# Patient Record
Sex: MALE | Race: WHITE | NOT HISPANIC OR LATINO | Employment: PART TIME | URBAN - METROPOLITAN AREA
[De-identification: names, ages, dates, MRNs, and addresses within clinical notes are randomized per-mention and may not be internally consistent; named-entity substitution may affect disease eponyms.]

---

## 2021-02-08 ENCOUNTER — OFFICE VISIT (OUTPATIENT)
Dept: INTERNAL MEDICINE | Facility: CLINIC | Age: 51
End: 2021-02-08
Payer: COMMERCIAL

## 2021-02-08 VITALS
TEMPERATURE: 98 F | BODY MASS INDEX: 31.89 KG/M2 | SYSTOLIC BLOOD PRESSURE: 138 MMHG | HEIGHT: 75 IN | DIASTOLIC BLOOD PRESSURE: 80 MMHG | WEIGHT: 256.44 LBS | HEART RATE: 88 BPM | OXYGEN SATURATION: 97 %

## 2021-02-08 DIAGNOSIS — Z00.00 ENCOUNTER FOR PREVENTATIVE ADULT HEALTH CARE EXAMINATION: Primary | ICD-10-CM

## 2021-02-08 DIAGNOSIS — E11.9 TYPE 2 DIABETES MELLITUS WITHOUT COMPLICATION, WITHOUT LONG-TERM CURRENT USE OF INSULIN (H): ICD-10-CM

## 2021-02-08 DIAGNOSIS — Z12.5 SCREENING FOR PROSTATE CANCER: ICD-10-CM

## 2021-02-08 DIAGNOSIS — F33.41 RECURRENT MAJOR DEPRESSIVE DISORDER, IN PARTIAL REMISSION (H): ICD-10-CM

## 2021-02-08 DIAGNOSIS — I10 ESSENTIAL HYPERTENSION: ICD-10-CM

## 2021-02-08 LAB
ALBUMIN UR-MCNC: NEGATIVE MG/DL
APPEARANCE UR: CLEAR
BILIRUB UR QL STRIP: NEGATIVE
COLOR UR AUTO: YELLOW
ERYTHROCYTE [DISTWIDTH] IN BLOOD BY AUTOMATED COUNT: 12.6 % (ref 10–15)
GLUCOSE UR STRIP-MCNC: 500 MG/DL
HBA1C MFR BLD: 10.6 % (ref 0–5.6)
HCT VFR BLD AUTO: 44.7 % (ref 40–53)
HGB BLD-MCNC: 16.3 G/DL (ref 13.3–17.7)
HGB UR QL STRIP: NEGATIVE
KETONES UR STRIP-MCNC: NEGATIVE MG/DL
LEUKOCYTE ESTERASE UR QL STRIP: NEGATIVE
MCH RBC QN AUTO: 29.5 PG (ref 26.5–33)
MCHC RBC AUTO-ENTMCNC: 36.5 G/DL (ref 31.5–36.5)
MCV RBC AUTO: 81 FL (ref 78–100)
NITRATE UR QL: NEGATIVE
PH UR STRIP: 5 PH (ref 5–7)
PLATELET # BLD AUTO: 218 10E9/L (ref 150–450)
RBC # BLD AUTO: 5.52 10E12/L (ref 4.4–5.9)
SOURCE: ABNORMAL
SP GR UR STRIP: 1.01 (ref 1–1.03)
UROBILINOGEN UR STRIP-ACNC: 0.2 EU/DL (ref 0.2–1)
WBC # BLD AUTO: 7.5 10E9/L (ref 4–11)

## 2021-02-08 PROCEDURE — 80053 COMPREHEN METABOLIC PANEL: CPT | Performed by: INTERNAL MEDICINE

## 2021-02-08 PROCEDURE — 85027 COMPLETE CBC AUTOMATED: CPT | Performed by: INTERNAL MEDICINE

## 2021-02-08 PROCEDURE — 83036 HEMOGLOBIN GLYCOSYLATED A1C: CPT | Performed by: INTERNAL MEDICINE

## 2021-02-08 PROCEDURE — 83721 ASSAY OF BLOOD LIPOPROTEIN: CPT | Mod: 59 | Performed by: INTERNAL MEDICINE

## 2021-02-08 PROCEDURE — G0103 PSA SCREENING: HCPCS | Performed by: INTERNAL MEDICINE

## 2021-02-08 PROCEDURE — 36415 COLL VENOUS BLD VENIPUNCTURE: CPT | Performed by: INTERNAL MEDICINE

## 2021-02-08 PROCEDURE — 80061 LIPID PANEL: CPT | Performed by: INTERNAL MEDICINE

## 2021-02-08 PROCEDURE — 82043 UR ALBUMIN QUANTITATIVE: CPT | Performed by: INTERNAL MEDICINE

## 2021-02-08 PROCEDURE — 99386 PREV VISIT NEW AGE 40-64: CPT | Performed by: INTERNAL MEDICINE

## 2021-02-08 PROCEDURE — 84443 ASSAY THYROID STIM HORMONE: CPT | Performed by: INTERNAL MEDICINE

## 2021-02-08 PROCEDURE — 81003 URINALYSIS AUTO W/O SCOPE: CPT | Performed by: INTERNAL MEDICINE

## 2021-02-08 ASSESSMENT — MIFFLIN-ST. JEOR: SCORE: 2105.88

## 2021-02-08 NOTE — LETTER
February 10, 2021      Scout Perez  [address] [facility] [address]              Dear Scout,            High triglycerides and glucose. Not controlled diabetes.   Rest of the results are normal.   Try to improve diet and exercise.   Recommend to start on treatment for Diabetes with Metformin. Will call in. Recheck in 6 months.          Sincerely,      Jg Howell MD

## 2021-02-08 NOTE — LETTER
February 10, 2021      Scout Perez  [address] [facility] [address]              Dear Scout,            High triglycerides and glucose. Not controlled diabetes.   Rest of the results are normal.   Try to improve diet and exercise.   Recommend to start on treatment for Diabetes with Metformin. Will call in. Recheck in 6 months.          Sincerely,      Jg Howell MD

## 2021-02-08 NOTE — PROGRESS NOTES
SUBJECTIVE:   CC: Scout Perez is an 50 year old male who presents for preventative health visit.       Patient has been advised of split billing requirements and indicates understanding: Yes  Healthy Habits:     Getting at least 3 servings of Calcium per day:  NO (Few times a year)    Bi-annual eye exam:  NO    Dental care twice a year:  NO    Sleep apnea or symptoms of sleep apnea:  None    Frequency of exercise:  None    Duration of exercise:  N/A    Taking medications regularly:  No    Barriers to taking medications:  None    Medication side effects:  Not applicable    PHQ-2 Total Score: 4    Additional concerns today:  Yes          PROBLEMS TO ADD ON...  Has H/O DM. On diet , exercise. Blood sugars are controlled. No parestesias. No hypoglycemias.  Has h/o HTN. on medical treatment. BP has been controlled. No side effects from medications. No CP, HA, dizziness. good compliance with medications and low salt diet.  Has h/o depression. On medical treatment, controlled, no side effects. No depressive symptoms or suicidal ideation.    Has stopped all his medications, for 4 years. Needs reassessment.       Today's PHQ-2 Score:   PHQ-2 ( 1999 [employer]) 2/8/2021   Q1: Little interest or pleasure in doing things 1   Q2: Feeling down, depressed or hopeless 3   PHQ-2 Score 4       Abuse: Current or Past(Physical, Sexual or Emotional)- No  Do you feel safe in your environment? Yes        Social History     Tobacco Use     Smoking status: Never Smoker     Smokeless tobacco: Never Used   Substance Use Topics     Alcohol use: Not Currently     If you drink alcohol do you typically have >3 drinks per day or >7 drinks per week? No    No flowsheet data found.    Last PSA: No results found for: PSA    Reviewed orders with patient. Reviewed health maintenance and updated orders accordingly - Yes  Lab work is in process  Labs reviewed in EPIC    Reviewed and updated as needed this visit by clinical staff  Tobacco  Allergies   "Meds   Med Hx  Surg Hx  Fam Hx  Soc Hx        Reviewed and updated as needed this visit by Provider                    Review of Systems  CONSTITUTIONAL: NEGATIVE for fever, chills, change in weight  INTEGUMENTARY/SKIN: NEGATIVE for worrisome rashes, moles or lesions  EYES: NEGATIVE for vision changes or irritation  ENT: NEGATIVE for ear, mouth and throat problems  RESP: NEGATIVE for significant cough or SOB  CV: NEGATIVE for chest pain, palpitations or peripheral edema  GI: NEGATIVE for nausea, abdominal pain, heartburn, or change in bowel habits   male: negative for dysuria, hematuria, decreased urinary stream, erectile dysfunction, urethral discharge  MUSCULOSKELETAL: NEGATIVE for significant arthralgias or myalgia  NEURO: NEGATIVE for weakness, dizziness or paresthesias  PSYCHIATRIC: NEGATIVE for changes in mood or affect    OBJECTIVE:   BP (!) 160/86 (BP Location: Left arm, Patient Position: Sitting, Cuff Size: Adult Large)   Pulse 88   Temp 98  F (36.7  C) (Oral)   Ht 1.892 m (6' 2.5\")   Wt 116.3 kg (256 lb 7 oz)   SpO2 97%   BMI 32.48 kg/m      Physical Exam  GENERAL: healthy, alert and no distress  EYES: Eyes grossly normal to inspection, PERRL and conjunctivae and sclerae normal  HENT: ear canals and TM's normal, nose and mouth without ulcers or lesions  NECK: no adenopathy, no asymmetry, masses, or scars and thyroid normal to palpation  RESP: lungs clear to auscultation - no rales, rhonchi or wheezes  CV: regular rate and rhythm, normal S1 S2, no S3 or S4, no murmur, click or rub, no peripheral edema and peripheral pulses strong  ABDOMEN: soft, nontender, no hepatosplenomegaly, no masses and bowel sounds normal  MS: no gross musculoskeletal defects noted, no edema  SKIN: no suspicious lesions or rashes  NEURO: Normal strength and tone, mentation intact and speech normal  PSYCH: mentation appears normal, affect normal/bright    Diagnostic Test Results:  Labs reviewed in Epic    ASSESSMENT/PLAN: " "      ICD-10-CM    1. Encounter for preventative adult health care examination  Z00.00 CBC with platelets     Comprehensive metabolic panel     Lipid panel reflex to direct LDL Non-fasting     TSH with free T4 reflex     Prostate spec antigen screen     Hemoglobin A1c     *UA reflex to Microscopic     Albumin Random Urine Quantitative with Creat Ratio   2. Type 2 diabetes mellitus without complication, without long-term current use of insulin (H)  E11.9 Hemoglobin A1c   3. Recurrent major depressive disorder, in partial remission (H)  F33.41    4. Essential hypertension  I10 CBC with platelets     Comprehensive metabolic panel     Lipid panel reflex to direct LDL Non-fasting     TSH with free T4 reflex     *UA reflex to Microscopic     Albumin Random Urine Quantitative with Creat Ratio   5. Screening for prostate cancer  Z12.5 Prostate spec antigen screen       Patient has been advised of split billing requirements and indicates understanding: Yes  COUNSELING:   Reviewed preventive health counseling, as reflected in patient instructions       Regular exercise       Healthy diet/nutrition       Vision screening       Hearing screening       Colon cancer screening       Prostate cancer screening    Estimated body mass index is 32.48 kg/m  as calculated from the following:    Height as of this encounter: 1.892 m (6' 2.5\").    Weight as of this encounter: 116.3 kg (256 lb 7 oz).     Weight management plan: Discussed healthy diet and exercise guidelines    He reports that he has never smoked. He has never used smokeless tobacco.      Counseling Resources:  ATP IV Guidelines  Pooled Cohorts Equation Calculator  FRAX Risk Assessment  ICSI Preventive Guidelines  Dietary Guidelines for Americans, 2010  USDA's MyPlate  ASA Prophylaxis  Lung CA Screening    Jg Howell MD  [facility]  "

## 2021-02-09 LAB
ALBUMIN SERPL-MCNC: 3.9 G/DL (ref 3.4–5)
ALP SERPL-CCNC: 78 U/L (ref 40–150)
ALT SERPL W P-5'-P-CCNC: 67 U/L (ref 0–70)
ANION GAP SERPL CALCULATED.3IONS-SCNC: 10 MMOL/L (ref 3–14)
AST SERPL W P-5'-P-CCNC: 38 U/L (ref 0–45)
BILIRUB SERPL-MCNC: 0.6 MG/DL (ref 0.2–1.3)
BUN SERPL-MCNC: 12 MG/DL (ref 7–30)
CALCIUM SERPL-MCNC: 9.1 MG/DL (ref 8.5–10.1)
CHLORIDE SERPL-SCNC: 103 MMOL/L (ref 94–109)
CHOLEST SERPL-MCNC: 185 MG/DL
CO2 SERPL-SCNC: 23 MMOL/L (ref 20–32)
CREAT SERPL-MCNC: 0.66 MG/DL (ref 0.66–1.25)
CREAT UR-MCNC: 42 MG/DL
GFR SERPL CREATININE-BSD FRML MDRD: >90 ML/MIN/{1.73_M2}
GLUCOSE SERPL-MCNC: 338 MG/DL (ref 70–99)
HDLC SERPL-MCNC: 55 MG/DL
LDLC SERPL CALC-MCNC: ABNORMAL MG/DL
LDLC SERPL DIRECT ASSAY-MCNC: 98 MG/DL
MICROALBUMIN UR-MCNC: 12 MG/L
MICROALBUMIN/CREAT UR: 27.45 MG/G CR (ref 0–17)
NONHDLC SERPL-MCNC: 130 MG/DL
POTASSIUM SERPL-SCNC: 3.7 MMOL/L (ref 3.4–5.3)
PROT SERPL-MCNC: 7.3 G/DL (ref 6.8–8.8)
PSA SERPL-ACNC: 0.38 UG/L (ref 0–4)
SODIUM SERPL-SCNC: 136 MMOL/L (ref 133–144)
TRIGL SERPL-MCNC: 493 MG/DL
TSH SERPL DL<=0.005 MIU/L-ACNC: 2.16 MU/L (ref 0.4–4)

## 2021-03-31 ENCOUNTER — IMMUNIZATION (OUTPATIENT)
Dept: NURSING | Facility: CLINIC | Age: 51
End: 2021-03-31
Payer: COMMERCIAL

## 2021-03-31 PROCEDURE — 91300 PR COVID VAC PFIZER DIL RECON 30 MCG/0.3 ML IM: CPT

## 2021-03-31 PROCEDURE — 0001A PR COVID VAC PFIZER DIL RECON 30 MCG/0.3 ML IM: CPT

## 2021-04-21 ENCOUNTER — IMMUNIZATION (OUTPATIENT)
Dept: NURSING | Facility: CLINIC | Age: 51
End: 2021-04-21
Attending: INTERNAL MEDICINE
Payer: COMMERCIAL

## 2021-04-21 PROCEDURE — 91300 PR COVID VAC PFIZER DIL RECON 30 MCG/0.3 ML IM: CPT

## 2021-04-21 PROCEDURE — 0002A PR COVID VAC PFIZER DIL RECON 30 MCG/0.3 ML IM: CPT

## 2021-09-23 ENCOUNTER — TRANSFERRED RECORDS (OUTPATIENT)
Dept: MULTI SPECIALTY CLINIC | Facility: CLINIC | Age: 51
End: 2021-09-23

## 2021-09-23 LAB
CHOLESTEROL (EXTERNAL): 171 MG/DL (ref 100–199)
HDLC SERPL-MCNC: 65 MG/DL
LDL CHOLESTEROL (EXTERNAL): 73 MG/DL
NON HDL CHOLESTEROL (EXTERNAL): 106 MG/DL
TRIGLYCERIDES (EXTERNAL): 165 MG/DL

## 2022-07-23 ENCOUNTER — HOSPITAL ENCOUNTER (EMERGENCY)
Facility: CLINIC | Age: 52
Discharge: HOME | End: 2022-07-23
Attending: EMERGENCY MEDICINE | Admitting: EMERGENCY MEDICINE
Payer: COMMERCIAL

## 2022-07-23 ENCOUNTER — APPOINTMENT (OUTPATIENT)
Dept: GENERAL RADIOLOGY | Facility: CLINIC | Age: 52
End: 2022-07-23
Attending: EMERGENCY MEDICINE
Payer: COMMERCIAL

## 2022-07-23 VITALS
DIASTOLIC BLOOD PRESSURE: 108 MMHG | RESPIRATION RATE: 18 BRPM | HEIGHT: 76 IN | WEIGHT: 270 LBS | BODY MASS INDEX: 32.88 KG/M2 | OXYGEN SATURATION: 98 % | SYSTOLIC BLOOD PRESSURE: 162 MMHG | HEART RATE: 130 BPM | TEMPERATURE: 97.7 F

## 2022-07-23 DIAGNOSIS — R73.9 HYPERGLYCEMIA: ICD-10-CM

## 2022-07-23 DIAGNOSIS — R07.9 CHEST PAIN, UNSPECIFIED TYPE: ICD-10-CM

## 2022-07-23 DIAGNOSIS — E11.65 POORLY CONTROLLED TYPE 2 DIABETES MELLITUS (H): ICD-10-CM

## 2022-07-23 LAB
ALBUMIN SERPL-MCNC: 4.4 G/DL (ref 3.4–5)
ALP SERPL-CCNC: 78 U/L (ref 40–150)
ALT SERPL W P-5'-P-CCNC: 47 U/L (ref 0–70)
ANION GAP SERPL CALCULATED.3IONS-SCNC: 11 MMOL/L (ref 3–14)
AST SERPL W P-5'-P-CCNC: 28 U/L (ref 0–45)
BASOPHILS # BLD AUTO: 0.1 10E3/UL (ref 0–0.2)
BASOPHILS NFR BLD AUTO: 0 %
BILIRUB SERPL-MCNC: 1.1 MG/DL (ref 0.2–1.3)
BUN SERPL-MCNC: 19 MG/DL (ref 7–30)
CALCIUM SERPL-MCNC: 9.8 MG/DL (ref 8.5–10.1)
CHLORIDE BLD-SCNC: 100 MMOL/L (ref 94–109)
CO2 SERPL-SCNC: 21 MMOL/L (ref 20–32)
CREAT SERPL-MCNC: 0.66 MG/DL (ref 0.66–1.25)
D DIMER PPP FEU-MCNC: 0.29 UG/ML FEU (ref 0–0.5)
EOSINOPHIL # BLD AUTO: 0 10E3/UL (ref 0–0.7)
EOSINOPHIL NFR BLD AUTO: 0 %
ERYTHROCYTE [DISTWIDTH] IN BLOOD BY AUTOMATED COUNT: 12.4 % (ref 10–15)
FLUAV RNA SPEC QL NAA+PROBE: NEGATIVE
FLUBV RNA RESP QL NAA+PROBE: NEGATIVE
GFR SERPL CREATININE-BSD FRML MDRD: >90 ML/MIN/1.73M2
GLUCOSE BLD-MCNC: 441 MG/DL (ref 70–99)
HBA1C MFR BLD: 9.6 % (ref 0–5.6)
HCT VFR BLD AUTO: 52 % (ref 40–53)
HGB BLD-MCNC: 18.6 G/DL (ref 13.3–17.7)
IMM GRANULOCYTES # BLD: 0.1 10E3/UL
IMM GRANULOCYTES NFR BLD: 0 %
LIPASE SERPL-CCNC: 87 U/L (ref 73–393)
LYMPHOCYTES # BLD AUTO: 1.6 10E3/UL (ref 0.8–5.3)
LYMPHOCYTES NFR BLD AUTO: 10 %
MCH RBC QN AUTO: 29.2 PG (ref 26.5–33)
MCHC RBC AUTO-ENTMCNC: 35.8 G/DL (ref 31.5–36.5)
MCV RBC AUTO: 82 FL (ref 78–100)
MONOCYTES # BLD AUTO: 0.9 10E3/UL (ref 0–1.3)
MONOCYTES NFR BLD AUTO: 6 %
NEUTROPHILS # BLD AUTO: 13 10E3/UL (ref 1.6–8.3)
NEUTROPHILS NFR BLD AUTO: 84 %
NRBC # BLD AUTO: 0 10E3/UL
NRBC BLD AUTO-RTO: 0 /100
PLATELET # BLD AUTO: 298 10E3/UL (ref 150–450)
POTASSIUM BLD-SCNC: 4.9 MMOL/L (ref 3.4–5.3)
PROT SERPL-MCNC: 8.5 G/DL (ref 6.8–8.8)
RBC # BLD AUTO: 6.38 10E6/UL (ref 4.4–5.9)
RSV RNA SPEC NAA+PROBE: NEGATIVE
SARS-COV-2 RNA RESP QL NAA+PROBE: NEGATIVE
SODIUM SERPL-SCNC: 132 MMOL/L (ref 133–144)
TROPONIN I SERPL HS-MCNC: 20 NG/L
TROPONIN I SERPL HS-MCNC: 25 NG/L
TSH SERPL DL<=0.005 MIU/L-ACNC: 3.17 MU/L (ref 0.4–4)
WBC # BLD AUTO: 15.6 10E3/UL (ref 4–11)

## 2022-07-23 PROCEDURE — 96361 HYDRATE IV INFUSION ADD-ON: CPT

## 2022-07-23 PROCEDURE — 85379 FIBRIN DEGRADATION QUANT: CPT | Performed by: EMERGENCY MEDICINE

## 2022-07-23 PROCEDURE — 258N000003 HC RX IP 258 OP 636: Performed by: EMERGENCY MEDICINE

## 2022-07-23 PROCEDURE — 84443 ASSAY THYROID STIM HORMONE: CPT | Performed by: EMERGENCY MEDICINE

## 2022-07-23 PROCEDURE — 80053 COMPREHEN METABOLIC PANEL: CPT | Performed by: EMERGENCY MEDICINE

## 2022-07-23 PROCEDURE — 83690 ASSAY OF LIPASE: CPT | Performed by: EMERGENCY MEDICINE

## 2022-07-23 PROCEDURE — 250N000011 HC RX IP 250 OP 636: Performed by: EMERGENCY MEDICINE

## 2022-07-23 PROCEDURE — 36415 COLL VENOUS BLD VENIPUNCTURE: CPT | Performed by: EMERGENCY MEDICINE

## 2022-07-23 PROCEDURE — 87637 SARSCOV2&INF A&B&RSV AMP PRB: CPT | Performed by: EMERGENCY MEDICINE

## 2022-07-23 PROCEDURE — 96374 THER/PROPH/DIAG INJ IV PUSH: CPT

## 2022-07-23 PROCEDURE — 99285 EMERGENCY DEPT VISIT HI MDM: CPT | Mod: CS,25

## 2022-07-23 PROCEDURE — 93005 ELECTROCARDIOGRAM TRACING: CPT

## 2022-07-23 PROCEDURE — 85025 COMPLETE CBC W/AUTO DIFF WBC: CPT | Performed by: EMERGENCY MEDICINE

## 2022-07-23 PROCEDURE — 71046 X-RAY EXAM CHEST 2 VIEWS: CPT

## 2022-07-23 PROCEDURE — 84484 ASSAY OF TROPONIN QUANT: CPT | Performed by: EMERGENCY MEDICINE

## 2022-07-23 PROCEDURE — 83036 HEMOGLOBIN GLYCOSYLATED A1C: CPT | Performed by: EMERGENCY MEDICINE

## 2022-07-23 PROCEDURE — C9803 HOPD COVID-19 SPEC COLLECT: HCPCS

## 2022-07-23 RX ORDER — ONDANSETRON 2 MG/ML
4 INJECTION INTRAMUSCULAR; INTRAVENOUS ONCE
Status: DISCONTINUED | OUTPATIENT
Start: 2022-07-23 | End: 2022-07-24 | Stop reason: HOSPADM

## 2022-07-23 RX ORDER — ONDANSETRON 2 MG/ML
4 INJECTION INTRAMUSCULAR; INTRAVENOUS ONCE
Status: COMPLETED | OUTPATIENT
Start: 2022-07-23 | End: 2022-07-23

## 2022-07-23 RX ADMIN — ONDANSETRON 4 MG: 2 INJECTION INTRAMUSCULAR; INTRAVENOUS at 22:31

## 2022-07-23 RX ADMIN — SODIUM CHLORIDE 1000 ML: 9 INJECTION, SOLUTION INTRAVENOUS at 20:58

## 2022-07-23 RX ADMIN — SODIUM CHLORIDE 1000 ML: 9 INJECTION, SOLUTION INTRAVENOUS at 22:30

## 2022-07-23 ASSESSMENT — ENCOUNTER SYMPTOMS
FEVER: 0
COUGH: 0
ABDOMINAL PAIN: 0
HALLUCINATIONS: 0
VOMITING: 1
NAUSEA: 1

## 2022-07-24 ENCOUNTER — HOSPITAL ENCOUNTER (EMERGENCY)
Facility: CLINIC | Age: 52
Discharge: HOME | End: 2022-07-24
Attending: EMERGENCY MEDICINE | Admitting: EMERGENCY MEDICINE
Payer: COMMERCIAL

## 2022-07-24 VITALS
BODY MASS INDEX: 34.65 KG/M2 | HEIGHT: 74 IN | WEIGHT: 270 LBS | SYSTOLIC BLOOD PRESSURE: 155 MMHG | TEMPERATURE: 98 F | OXYGEN SATURATION: 98 % | RESPIRATION RATE: 17 BRPM | DIASTOLIC BLOOD PRESSURE: 96 MMHG | HEART RATE: 99 BPM

## 2022-07-24 DIAGNOSIS — R07.9 NONSPECIFIC CHEST PAIN: ICD-10-CM

## 2022-07-24 DIAGNOSIS — R00.2 PALPITATIONS: ICD-10-CM

## 2022-07-24 LAB
ATRIAL RATE - MUSE: 100 BPM
ATRIAL RATE - MUSE: 122 BPM
DIASTOLIC BLOOD PRESSURE - MUSE: NORMAL MMHG
DIASTOLIC BLOOD PRESSURE - MUSE: NORMAL MMHG
HOLD SPECIMEN: NORMAL
INTERPRETATION ECG - MUSE: NORMAL
INTERPRETATION ECG - MUSE: NORMAL
P AXIS - MUSE: 61 DEGREES
P AXIS - MUSE: 70 DEGREES
PR INTERVAL - MUSE: 164 MS
PR INTERVAL - MUSE: 172 MS
QRS DURATION - MUSE: 90 MS
QRS DURATION - MUSE: 94 MS
QT - MUSE: 304 MS
QT - MUSE: 370 MS
QTC - MUSE: 433 MS
QTC - MUSE: 477 MS
R AXIS - MUSE: 20 DEGREES
R AXIS - MUSE: 36 DEGREES
SYSTOLIC BLOOD PRESSURE - MUSE: NORMAL MMHG
SYSTOLIC BLOOD PRESSURE - MUSE: NORMAL MMHG
T AXIS - MUSE: 38 DEGREES
T AXIS - MUSE: 56 DEGREES
TROPONIN I SERPL HS-MCNC: 22 NG/L
TROPONIN T BLD-MCNC: 0.01 UG/L
VENTRICULAR RATE- MUSE: 100 BPM
VENTRICULAR RATE- MUSE: 122 BPM

## 2022-07-24 PROCEDURE — 36415 COLL VENOUS BLD VENIPUNCTURE: CPT | Performed by: EMERGENCY MEDICINE

## 2022-07-24 PROCEDURE — 93005 ELECTROCARDIOGRAM TRACING: CPT

## 2022-07-24 PROCEDURE — 84484 ASSAY OF TROPONIN QUANT: CPT | Performed by: EMERGENCY MEDICINE

## 2022-07-24 PROCEDURE — 84484 ASSAY OF TROPONIN QUANT: CPT

## 2022-07-24 PROCEDURE — 99284 EMERGENCY DEPT VISIT MOD MDM: CPT

## 2022-07-24 ASSESSMENT — ENCOUNTER SYMPTOMS
PALPITATIONS: 1
DIZZINESS: 0
FEVER: 0
DIARRHEA: 0
SHORTNESS OF BREATH: 1
SORE THROAT: 1
HEADACHES: 0
BACK PAIN: 0
ABDOMINAL PAIN: 0

## 2022-07-24 NOTE — ED NOTES
Bed: ED12  Expected date:   Expected time:   Means of arrival:   Comments:  Tiffani 1 52male cp eta 5

## 2022-07-24 NOTE — ED PROVIDER NOTES
History   Chief Complaint:  Chest pain     HPI   Scout Perez is a 52 year old male with history of type II diabetes mellitus and HTN who presents with constant chest pain for three hours with associated nausea today. Patient reports this chest pain came on suddenly while he was sleeping and woke him up. He has never experienced this pain before and is concerned about an MI. Since being in the ED, patient reports his chest pain has since resolved. He initially denies vomiting, but had one episode of vomiting here in the ED. Denies abdominal pain, leg swelling, fever, and cough. Patient says his new job as a  has been a major stressor in his life. Denies drug and alcohol use. No recent surgeries. Patient does not want to talk about previous encounters with doctors and states he is not taking any medications for his diabetes. On recheck, patient denies hallucinations and suicidal or homicidal ideations.       Review of Systems   Constitutional: Negative for fever.   Respiratory: Negative for cough.    Cardiovascular: Positive for chest pain (since resolved). Negative for leg swelling.   Gastrointestinal: Positive for nausea and vomiting. Negative for abdominal pain.   Psychiatric/Behavioral: Negative for hallucinations and suicidal ideas.   All other systems reviewed and are negative.      Allergies:  Metformin    Medications:  The patient is not currently taking any prescribed medications.    Past Medical History:     Type II diabetes mellitus  Depression  Anxiety  Learning disability  Insomnia  HTN  Hyperlipidemia     Family History:    Mother: HTN, heart disease, hyperlipidemia  Father: Kidney disease, heart disease, HTN, hyperlipidemia  Brother: Alcoholism    Social History:  The patient presents to the ED alone.  Never smoker      Physical Exam     Patient Vitals for the past 24 hrs:   BP Temp Temp src Pulse Resp SpO2 Height Weight   07/23/22 2330 -- -- -- -- -- 98 % -- --   07/23/22 2232 -- 97.7  F  "(36.5  C) Oral -- -- -- -- --   07/23/22 2200 -- -- -- -- -- 98 % -- --   07/23/22 2145 (!) 162/108 -- -- -- -- -- -- --   07/23/22 2030 -- -- -- -- -- 98 % -- --   07/23/22 2029 -- -- -- -- -- 99 % -- --   07/23/22 1943 (!) 160/107 -- -- (!) 130 18 98 % 1.93 m (6' 4\") 122.5 kg (270 lb)       Physical Exam  General: Alert and cooperative with exam. Patient in mild distress. Normal mentation.   Head:  Scalp is NC/AT  Eyes:  No scleral icterus, PERRL  ENT:  The external nose and ears are normal. The oropharynx is normal and without erythema; mucus membranes are moist. Uvula midline, no evidence of deep space infection.  Neck:  Normal range of motion without rigidity.  CV:  Tachycardic regular rhythm    No pathologic murmur   Resp:  Breath sounds are clear bilaterally    Non-labored, no retractions or accessory muscle use  GI:  Abdomen is soft, no distension, no tenderness. No peritoneal signs  MS:  No lower extremity edema   Skin:  Warm and dry, No rash or lesions noted.  Neuro: Oriented x 3. No gross motor deficits.   Psych:  Denies SI, HI, and hallucinations. Evidence of delusional thinking.       Emergency Department Course   ECG  ECG taken at 2047, ECG read at 2058  Sinus tachycardia  Bilateral enlargement  Abnormal ECG   Rate 122 bpm. NM interval 164 ms. QRS duration 94 ms. QT/QTc 304/433 ms. P-R-T axes 70 36 38.     Imaging:  Chest XR,  PA & LAT   Final Result   IMPRESSION:       Mild nonspecific focal right apex opacities; correlate for infectious / inflammatory symptoms. Recommend follow-up PA and lateral radiographs in 6 weeks to exclude an underlying nodule.       Remaining lungs are grossly clear. No pleural effusion or pneumothorax. Normal heart size.                 Report per radiology    Laboratory:  Labs Ordered and Resulted from Time of ED Arrival to Time of ED Departure   COMPREHENSIVE METABOLIC PANEL - Abnormal       Result Value    Sodium 132 (*)     Potassium 4.9      Chloride 100      Carbon " Dioxide (CO2) 21      Anion Gap 11      Urea Nitrogen 19      Creatinine 0.66      Calcium 9.8      Glucose 441 (*)     Alkaline Phosphatase 78      AST 28      ALT 47      Protein Total 8.5      Albumin 4.4      Bilirubin Total 1.1      GFR Estimate >90     CBC WITH PLATELETS AND DIFFERENTIAL - Abnormal    WBC Count 15.6 (*)     RBC Count 6.38 (*)     Hemoglobin 18.6 (*)     Hematocrit 52.0      MCV 82      MCH 29.2      MCHC 35.8      RDW 12.4      Platelet Count 298      % Neutrophils 84      % Lymphocytes 10      % Monocytes 6      % Eosinophils 0      % Basophils 0      % Immature Granulocytes 0      NRBCs per 100 WBC 0      Absolute Neutrophils 13.0 (*)     Absolute Lymphocytes 1.6      Absolute Monocytes 0.9      Absolute Eosinophils 0.0      Absolute Basophils 0.1      Absolute Immature Granulocytes 0.1      Absolute NRBCs 0.0     TROPONIN I - Normal    Troponin I High Sensitivity 20     D DIMER QUANTITATIVE - Normal    D-Dimer Quantitative 0.29     LIPASE - Normal    Lipase 87     TROPONIN I - Normal    Troponin I High Sensitivity 25     TSH WITH FREE T4 REFLEX - Normal    TSH 3.17            Emergency Department Course:             Reviewed:  I reviewed nursing notes, vitals, past medical history and Care Everywhere    Assessments:  2031 I obtained history and examined the patient as noted above.     2300 I rechecked the patient and explained findings.     Interventions:  2058 NS  1L  IV  2230 NS  1L  IV  2231 Zofran  4 mg  IV    Disposition:  The patient was discharged to home.     Impression & Plan       Medical Decision Making:  Patient is a 52-year-old male with history of type 2 diabetes who presents with 3-hour episode of chest pain that woke him from sleep that is now resolved.  Presents by EMS.  On arrival patient is currently asymptomatic and noted to be tachycardic into the 120s/30s.  He is clinically dry in appearance.  Initial consideration for ACS/MI, esophageal spasm/GERD, MSK pain, metabolic  "disorder, electrolyte abnormality, pneumothorax, PE, pancreatitis, PUD, biliary pathology, among others.  Labs, EKG, imaging was obtained.  Chest x-ray demonstrates mild nonspecific focal right apex opacities; patient without current infectious symptoms up; discussed with patient and need for future follow-up imaging to ensure resolution.  EKG shows sinus tachycardia without evidence of acute ischemia, infarction, or significant arrhythmia.  Labs notable for significant elevated glucose (441), elevated hemoglobin A1c menses 9.6), normal troponin x2, elevated white count (15.6), elevated hemoglobin (18.6), and a normal D-dimer and TSH.  Patient appears hemoconcentrated and there is no clinical evidence of infectious process.  Provided 2 L IV fluid with significant improvement in heart rate; dehydration likely secondary to poorly controlled type 2 diabetes.  Patient appears to have delusional thinking regarding his diabetes and denies existence of his illness.  He is noncompliant with previously prescribed metformin and refuses prescription for metformin or additional medical management.  Patient was counseled on the significant adverse health effects of poorly controlled diabetes.  Given concern for follow-up, I did offer the patient hospital observation for further management and care; he refused this.  My suspicion for ACS at this time is low.  Patient requested discharge and I recommended close follow-up with PCP.  He said he would \"think about it\".  Patient was discharged in stable condition and was asymptomatic at that time.  Return precautions were discussed.  No indication for mental health hold at this time.  Patient discharged.     Covid-19  Scout Perez was evaluated during a global COVID-19 pandemic, which necessitated consideration that the patient might be at risk for infection with the SARS-CoV-2 virus that causes COVID-19.   Applicable protocols for evaluation were followed during the patient's " care.   COVID-19 was considered as part of the patient's evaluation. The plan for testing is: a test was obtained during this visit.    COVID-19 Virus (Coronavirus) by PCR Nasopharyngeal Swab: pending        Diagnosis:    ICD-10-CM    1. Chest pain, unspecified type  R07.9    2. Poorly controlled type 2 diabetes mellitus (H)  E11.65    3. Hyperglycemia  R73.9            Scribe Disclosure:  I, Lily Lo, am serving as a scribe at 8:29 PM on 7/23/2022 to document services personally performed by Ted Velázquez DO based on my observations and the provider's statements to me.            Ted Velázquez DO  07/24/22 1212

## 2022-07-24 NOTE — ED NOTES
Bed: ED11  Expected date:   Expected time:   Means of arrival:   Comments:  Tiffani 3 when clean 52m cp

## 2022-07-24 NOTE — ED TRIAGE NOTES
Pt had chest pain for about 3 hours today. Called EMS for self and symptoms have resolved upon arrival.  Reports not having pain like this before.     Triage Assessment     Row Name 07/23/22 1944       Triage Assessment (Adult)    Airway WDL WDL       Respiratory WDL    Respiratory WDL WDL       Skin Circulation/Temperature WDL    Skin Circulation/Temperature WDL WDL       Cardiac WDL    Cardiac WDL chest pain  resolved        Peripheral/Neurovascular WDL    Peripheral Neurovascular WDL WDL       Cognitive/Neuro/Behavioral WDL    Cognitive/Neuro/Behavioral WDL WDL

## 2022-07-24 NOTE — ED TRIAGE NOTES
"Pt was brought in by EMS from home due to shortness of breath, chest pain. Pt was seen earlier in ED for similar issue and was sent home after Xray and IVF.     Pt is diabetic and BS was 325. Pt is not on any medications for his diabetes. Pt took one aspirin at home. EMS gave him 3 more tabs of aspirin and one nitroglycerin spray sublingual. Pt reports feeling nauseated after thenitroprusside\",\"nitroglycerin,    "

## 2022-07-24 NOTE — ED PROVIDER NOTES
"  History     Chief Complaint:  Chest Pain and Shortness of Breath      HPI   Scout Perez is a 52 year old male who presents with return of chest pain, palpitations, feeling like his throat was swelling around 1230 this morning upon returning home from the ER.  Patient was seen last night in the ER for the same symptoms, which started acutely around 7 PM last night while he was laying around.  He felt like his throat was closing and could not breathe, and his heart was pounding.  He was seen here and evaluated with labs, EKG, chest x-ray which showed hyperglycemia and mild leukocytosis, but no other significant findings.  He was discharged after symptoms had improved.  Patient seems to blame his symptoms on his place of employment, saying everyone there is smoking \"pot and meth and other drugs.\"  He thinks that he has been inhaling the drugs and that is causing his symptoms.  He notes improvement in symptoms upon arrival to the ED.  He was given aspirin and nitroglycerin by EMS.  He denies any diaphoresis, fever, cough, abdominal pain, leg swelling.  He did have some vomiting yesterday morning which has resolved.  He has history of diabetes and has not seen a doctor in 5 years.  He denies taking any medications.  He denies drug or alcohol use.    Review of Systems   Constitutional: Negative for fever.   HENT: Positive for sore throat.    Respiratory: Positive for shortness of breath.    Cardiovascular: Positive for chest pain and palpitations.   Gastrointestinal: Negative for abdominal pain and diarrhea.   Musculoskeletal: Negative for back pain.   Neurological: Negative for dizziness and headaches.   All other systems reviewed and are negative.        Allergies:  No Known Allergies    Medications:    None     Past Medical History:   Past Surgical History:     Diabetes  Hypertension No past surgical history on file.   There are no problems to display for this patient.         Family History  Family History " "  Problem Relation Age of Onset     Hypertension Mother      Heart Disease Mother      Kidney Disease Father      Diabetes Paternal Grandfather         Type 1     Diabetes Niece         Type 1     Alcoholism Brother        Social History:  PCP: No Ref-Primary, Physician  Presents to the ED alone by EMS  No smoking, ETOH, or drug use    Physical Exam     Patient Vitals for the past 24 hrs:   BP Temp Temp src Pulse Resp SpO2 Height Weight   07/24/22 0745 (!) 155/96 -- -- 99 -- 98 % -- --   07/24/22 0700 -- -- -- 101 17 99 % -- --   07/24/22 0446 -- -- -- -- -- -- 1.88 m (6' 2\") 122.5 kg (270 lb)   07/24/22 0445 (!) 152/94 98  F (36.7  C) Oral 101 13 98 % -- --       Physical Exam  Vitals and nursing note reviewed.   Constitutional:       General: He is not in acute distress.     Appearance: Normal appearance. He is not ill-appearing.   HENT:      Head: Normocephalic and atraumatic.      Right Ear: External ear normal.      Left Ear: External ear normal.      Nose: Nose normal.      Mouth/Throat:      Mouth: Mucous membranes are moist.      Pharynx: Oropharynx is clear. No oropharyngeal exudate or posterior oropharyngeal erythema.   Eyes:      Conjunctiva/sclera: Conjunctivae normal.   Cardiovascular:      Rate and Rhythm: Normal rate and regular rhythm.      Pulses: Normal pulses.      Heart sounds: No murmur heard.  Pulmonary:      Effort: Pulmonary effort is normal. No respiratory distress.      Breath sounds: No stridor. No wheezing, rhonchi or rales.   Abdominal:      General: Abdomen is flat. There is no distension.      Palpations: Abdomen is soft.      Tenderness: There is no abdominal tenderness. There is no guarding or rebound.   Musculoskeletal:         General: No swelling or deformity.      Cervical back: Normal range of motion and neck supple.   Skin:     General: Skin is warm and dry.      Findings: No rash.   Neurological:      Mental Status: He is alert and oriented to person, place, and time. "   Psychiatric:         Mood and Affect: Mood is anxious.         Behavior: Behavior is agitated.         Emergency Department Course   ECG:  ECG taken at 0443, ECG read at 0650  NSR, poss left atrial enlargement   Slower rate as compared to prior, dated 22.  Rate 100 bpm. MD interval 172 ms. QRS duration 90 ms. QT/QTc 370/477 ms. P-R-T axes 61 20 56.     Imaging:  No orders to display       Laboratory:  Labs Ordered and Resulted from Time of ED Arrival to Time of ED Departure   ISTAT TROPONIN POCT - Normal       Result Value    TROPPC POCT 0.01     TROPONIN I - Normal    Troponin I High Sensitivity 22           Emergency Department Course:           Reviewed:  I reviewed nursing notes, vitals, past history and care everywhere      Interventions:  Medications - No data to display    Disposition:  The patient was discharged to home.    Impression & Plan      Medical Decision Makin-year-old male presenting with return of chest pain, shortness of breath, throat swelling sensation.  Reviewed previous record from yesterday's visit.  Aside from a mild leukocytosis and hyperglycemia his work-up was negative.  There is no signs of acute MI.  His D-dimer was normal so unlikely PE.  His chest x-ray showed equivocal right apical infiltrate, however he does not have any significant symptoms to suggest a pneumonia.  Remainder of his work-up was unremarkable.  His EKG here shows no acute changes, and no signs of ischemia.  His repeat troponin is stable from previous.  His vital signs and symptoms have all improved while in the ED.  He does seem very anxious and irritable.  I again stressed the importance of establishing with a primary care physician to address his uncontrolled diabetes and hypertension, and to recheck todays complaints.  We discussed return precautions.    Covid-19  Scout Perez was evaluated during a global COVID-19 pandemic, which necessitated consideration that the patient might be at risk for  infection with the SARS-CoV-2 virus that causes COVID-19.  Applicable protocols for evaluation were followed during the patient's care. COVID-19 was considered as part of the patient's evaluation.       Diagnosis:    ICD-10-CM    1. Nonspecific chest pain  R07.9    2. Palpitations  R00.2        Discharge Medications:  Discharge Medication List as of 7/24/2022  7:42 AM                 Kayode Reyez MD  07/24/22 0813

## 2022-07-25 ENCOUNTER — HOSPITAL ENCOUNTER (EMERGENCY)
Facility: CLINIC | Age: 52
Discharge: LEFT BEFORE DISCHARGE | End: 2022-07-25
Admitting: EMERGENCY MEDICINE
Payer: COMMERCIAL

## 2022-07-25 VITALS
SYSTOLIC BLOOD PRESSURE: 191 MMHG | DIASTOLIC BLOOD PRESSURE: 108 MMHG | OXYGEN SATURATION: 99 % | HEART RATE: 86 BPM | RESPIRATION RATE: 20 BRPM | TEMPERATURE: 98.1 F

## 2022-07-25 LAB
ATRIAL RATE - MUSE: 84 BPM
BASOPHILS # BLD AUTO: 0.1 10E3/UL (ref 0–0.2)
BASOPHILS NFR BLD AUTO: 1 %
DIASTOLIC BLOOD PRESSURE - MUSE: NORMAL MMHG
EOSINOPHIL # BLD AUTO: 0.1 10E3/UL (ref 0–0.7)
EOSINOPHIL NFR BLD AUTO: 2 %
ERYTHROCYTE [DISTWIDTH] IN BLOOD BY AUTOMATED COUNT: 12.3 % (ref 10–15)
HCT VFR BLD AUTO: 44.2 % (ref 40–53)
HGB BLD-MCNC: 15.6 G/DL (ref 13.3–17.7)
IMM GRANULOCYTES # BLD: 0 10E3/UL
IMM GRANULOCYTES NFR BLD: 0 %
INTERPRETATION ECG - MUSE: NORMAL
LYMPHOCYTES # BLD AUTO: 2.5 10E3/UL (ref 0.8–5.3)
LYMPHOCYTES NFR BLD AUTO: 31 %
MCH RBC QN AUTO: 29.1 PG (ref 26.5–33)
MCHC RBC AUTO-ENTMCNC: 35.3 G/DL (ref 31.5–36.5)
MCV RBC AUTO: 83 FL (ref 78–100)
MONOCYTES # BLD AUTO: 0.5 10E3/UL (ref 0–1.3)
MONOCYTES NFR BLD AUTO: 7 %
NEUTROPHILS # BLD AUTO: 4.7 10E3/UL (ref 1.6–8.3)
NEUTROPHILS NFR BLD AUTO: 59 %
NRBC # BLD AUTO: 0 10E3/UL
NRBC BLD AUTO-RTO: 0 /100
P AXIS - MUSE: 67 DEGREES
PLATELET # BLD AUTO: 234 10E3/UL (ref 150–450)
PR INTERVAL - MUSE: 152 MS
QRS DURATION - MUSE: 86 MS
QT - MUSE: 388 MS
QTC - MUSE: 458 MS
R AXIS - MUSE: 32 DEGREES
RBC # BLD AUTO: 5.36 10E6/UL (ref 4.4–5.9)
SYSTOLIC BLOOD PRESSURE - MUSE: NORMAL MMHG
T AXIS - MUSE: 49 DEGREES
TROPONIN I SERPL HS-MCNC: 12 NG/L
VENTRICULAR RATE- MUSE: 84 BPM
WBC # BLD AUTO: 7.9 10E3/UL (ref 4–11)

## 2022-07-25 PROCEDURE — 84484 ASSAY OF TROPONIN QUANT: CPT | Performed by: EMERGENCY MEDICINE

## 2022-07-25 PROCEDURE — 36415 COLL VENOUS BLD VENIPUNCTURE: CPT | Performed by: EMERGENCY MEDICINE

## 2022-07-25 PROCEDURE — 85025 COMPLETE CBC W/AUTO DIFF WBC: CPT | Performed by: EMERGENCY MEDICINE

## 2022-07-25 PROCEDURE — 999N000104 HC STATISTIC NO CHARGE

## 2022-07-25 NOTE — ED TRIAGE NOTES
Pt walked into Triage reported throat swelling. Pt immediately taken to triage room to have vital signs taken. Pt speaks in full sentences, respirations even and unlabored. Airway patent.     Of note, pt seen for similar symptoms in the ED yesterday and the day before 07/23/2022.

## 2022-07-26 ENCOUNTER — NURSE TRIAGE (OUTPATIENT)
Dept: FAMILY MEDICINE | Facility: CLINIC | Age: 52
End: 2022-07-26

## 2022-07-26 NOTE — ED NOTES
"Complaints of left arm feeling burning hot.  States \"there is something wrong with me.  Take it all again!\"  No significant redness or swelling visualized to arm.    States trouble swallowing.  Has to hold throat area to swallow. Able to swallow salvia.   "

## 2022-07-26 NOTE — TELEPHONE ENCOUNTER
Called patient and assisted with scheduling sooner appointment.   Appointments in Next Year    Jul 28, 2022  1:00 PM  (Arrive by 12:40 PM)  Provider Visit with Deacon Bernard NP  [facility] ([facility] ) [phone]         Darlin Khanna RN  [facility]

## 2022-07-26 NOTE — TELEPHONE ENCOUNTER
"[facility] care team-Please advise if patient can be offered an approval required slot this week?  This is for ER follow up/recurrent chest pain/throat symptoms. Patient is available at [phone] and gave verbal permission to leave a detailed message.    Patient's call warm transferred via priority nurse line with patient reporting chest pain and feeling as though his throat is closing.    Patient spoke in full, complete sentences during phone call.    Nurse Triage SBAR    Is this a 2nd Level Triage? YES, LICENSED PRACTITIONER REVIEW IS REQUIRED    Situation: Recurrent chest pain/feeling as though throat is closing.      Background: Evaluated in ER twice since 7/23/22 and left ER after triage yesterday-see below for further information regarding symptoms.    Assessment: Patient should be evaluated today.    Protocol Recommended Disposition:   See Today In Office    Recommendation: Is it possible for patient to be offered approval required slot tomorrow and/or be added onto a provider's schedule today?  Patient reported he does not currently have a PCP and wishes to establish care with a new PCP.     Routed to provider    Does the patient meet one of the following criteria for ADS visit consideration? 16+ years old, no PCP (internal or external) but seen at [facility] (seen at ER)    TIP  Providers, please consider if this condition is appropriate for management at one of our Acute and Diagnostic Services sites.     If patient is a good candidate, please use dotphrase <dot>triageresponse and select Refer to ADS to document.      Per patient:  1. Three ER visits since 7/23/22 (left without being seen after triage yesterday)   A. Chest pain and sore throat-workup negative   B. Told to establish care with Primary Care provider  2. After getting home from ER yesterday, ate food and \"felt good\"  3. Currently at work and symptoms are coming back   A. Intermittent chest pain that is moderate and throat felt like " "it was closing   B. Symptoms improve with eructation   C. Now that he has been outside his symptoms are resolved   D. Able to swallow saliva  4. Gave two weeks notice with current employer and starts a new job on 8/8/22   A. Been working there for 6 years and exposed to other workers smoking meth and \"dope\"   B. Thinks this exposure to illicit substances has affected him and \"no one cares\" about people doing drugs at his place of employment  5. History of Type II DM   A. Off medication for 6 years   B. Not sure what is supposed to be taking   C. Does not check blood sugar levels-no longer has glucometer/testing supplies  6. Supposed to go out of town this weekend and does not think he should wait until 8/5/22 for Primary Care visit  7. No SOB/difficulty breathing  8. No history of heart disease/lung disease  9. No syncope  10. No dizziness/nausea  11. When feeling better coughs intermittently  12. Feels fine when not in building/work environment  13. Has a learning disability    Discussed with patient:  1. Will send high priority message to care team in Advance to ask if patient can see a provider this week for ER/symptom follow up  2. If chest pain/throat symptoms recur and chest pain is not resolving/is severe/is worsening and/or unable to swallow/any breathing difficulty, call 911  3. Please contact OSHA regarding illicit substance exposure at work.  Reviewed [place] OS office number: [phone].    Patient verbalized understanding and in agreement with plan.                    Reason for Disposition    Patient wants to be seen    Additional Information    Negative: Severe difficulty breathing (e.g., struggling for each breath, speaks in single words)    Negative: Passed out (i.e., fainted, collapsed and was not responding)    Negative: Difficult to awaken or acting confused (e.g., disoriented, slurred speech)    Negative: Shock suspected (e.g., cold/pale/clammy skin, too weak to stand, low BP, rapid " pulse)    Negative: Chest pain lasting longer than 5 minutes and ANY of the following:* Over 45 years old* Over 30 years old and at least one cardiac risk factor (e.g., diabetes, high blood pressure, high cholesterol, smoker, or strong family history of heart disease)* History of heart disease (i.e., angina, heart attack, heart failure, bypass surgery, takes nitroglycerin)* Pain is crushing, pressure-like, or heavy    Negative: Heart beating < 50 beats per minute OR > 140 beats per minute    Negative: Visible sweat on face or sweat dripping down face    Negative: Sounds like a life-threatening emergency to the triager    Negative: Followed an injury to chest    Negative: SEVERE chest pain    Negative: Pain also in shoulder(s) or arm(s) or jaw    Negative: Difficulty breathing    Negative: Cocaine use within last 3 days    Negative: Major surgery in the past month    Negative: Hip or leg fracture (broken bone) in past month (or had cast on leg or ankle in past month)    Negative: Illness requiring prolonged bedrest in past month (e.g., immobilization, long hospital stay)    Negative: Long-distance travel in past month (e.g., car, bus, train, plane; with trip lasting 6 or more hours)    Negative: History of prior 'blood clot' in leg or lungs (i.e., deep vein thrombosis, pulmonary embolism)    Negative: History of inherited increased risk of blood clots (e.g., Factor 5 Leiden, Anti-thrombin 3, Protein C or Protein S deficiency, Prothrombin mutation)    Negative: Heart beating irregularly or very rapidly    Negative: Chest pain lasting longer than 5 minutes and occurred in last 3 days (72 hours) (Exception: feels exactly the same as previously diagnosed heartburn and has accompanying sour taste in mouth)    Negative: Chest pain or 'angina' comes and goes and is happening more often (increasing in frequency) or getting worse (increasing in severity) (Exception: chest pains that last only a few seconds)    Negative:  Dizziness or lightheadedness    Negative: Coughing up blood    Negative: Patient sounds very sick or weak to the triager    Negative: Cancer treatment in the past two months (or has cancer now)    Negative: Patient says chest pain feels exactly the same as previously diagnosed 'heartburn'  and  describes burning in chest and accompanying sour taste in mouth    Negative: Chest pain(s) lasting a few seconds persists > 3 days    Negative: Rash in same area as pain (may be described as 'small blisters')    Negative: All other patients with chest pain (Exception: fleeting chest pain lasting a few seconds)    Negative: Fever > 100.4 F (38.0 C)    Protocols used: CHEST PAIN-A-MAYRA Greenberg, RN  [facility]

## 2022-07-26 NOTE — ED TRIAGE NOTES
Patient states chest pain, SOB, feels like throat swelling.  Seen in ER yesterday & day before.  Achy all over body.      Triage Assessment     Row Name 07/25/22 4677       Triage Assessment (Adult)    Airway WDL WDL       Respiratory WDL    Respiratory WDL X;all    Rhythm/Pattern, Respiratory shortness of breath;pattern regular;depth regular;unlabored       Skin Circulation/Temperature WDL    Skin Circulation/Temperature WDL WDL       Cardiac WDL    Cardiac WDL X;chest pain       Peripheral/Neurovascular WDL    Peripheral Neurovascular WDL WDL       Cognitive/Neuro/Behavioral WDL    Cognitive/Neuro/Behavioral WDL WDL

## 2022-08-03 ENCOUNTER — OFFICE VISIT (OUTPATIENT)
Dept: BEHAVIORAL HEALTH | Facility: CLINIC | Age: 52
End: 2022-08-03

## 2022-08-03 ENCOUNTER — OFFICE VISIT (OUTPATIENT)
Dept: INTERNAL MEDICINE | Facility: CLINIC | Age: 52
End: 2022-08-03
Payer: COMMERCIAL

## 2022-08-03 VITALS
WEIGHT: 247 LBS | BODY MASS INDEX: 31.71 KG/M2 | HEART RATE: 83 BPM | DIASTOLIC BLOOD PRESSURE: 78 MMHG | OXYGEN SATURATION: 97 % | TEMPERATURE: 98.4 F | RESPIRATION RATE: 16 BRPM | SYSTOLIC BLOOD PRESSURE: 140 MMHG

## 2022-08-03 DIAGNOSIS — Z13.220 SCREENING FOR HYPERLIPIDEMIA: ICD-10-CM

## 2022-08-03 DIAGNOSIS — E11.9 TYPE 2 DIABETES MELLITUS WITHOUT COMPLICATION, WITHOUT LONG-TERM CURRENT USE OF INSULIN (H): Primary | ICD-10-CM

## 2022-08-03 DIAGNOSIS — F43.23 ADJUSTMENT DISORDER WITH MIXED ANXIETY AND DEPRESSED MOOD: Primary | ICD-10-CM

## 2022-08-03 PROCEDURE — 99215 OFFICE O/P EST HI 40 MIN: CPT

## 2022-08-03 RX ORDER — METFORMIN HCL 500 MG
500 TABLET, EXTENDED RELEASE 24 HR ORAL 2 TIMES DAILY WITH MEALS
Qty: 180 TABLET | Refills: 0 | Status: SHIPPED | OUTPATIENT
Start: 2022-08-03 | End: 2022-08-24

## 2022-08-03 ASSESSMENT — ENCOUNTER SYMPTOMS
NAUSEA: 0
CHILLS: 0
DIAPHORESIS: 0
NUMBNESS: 0
HEMATOCHEZIA: 0
AGITATION: 1
DIARRHEA: 0
NERVOUS/ANXIOUS: 1
SHORTNESS OF BREATH: 0
PARESTHESIAS: 0
CONSTIPATION: 0
FEVER: 0
VOMITING: 0
CHEST TIGHTNESS: 0

## 2022-08-03 ASSESSMENT — PATIENT HEALTH QUESTIONNAIRE - PHQ9
10. IF YOU CHECKED OFF ANY PROBLEMS, HOW DIFFICULT HAVE THESE PROBLEMS MADE IT FOR YOU TO DO YOUR WORK, TAKE CARE OF THINGS AT HOME, OR GET ALONG WITH OTHER PEOPLE: NOT DIFFICULT AT ALL
SUM OF ALL RESPONSES TO PHQ QUESTIONS 1-9: 10
SUM OF ALL RESPONSES TO PHQ QUESTIONS 1-9: 10

## 2022-08-03 NOTE — PROGRESS NOTES
Patient had appointment with his/her primary care physician. [facility] services were requested / offered. No immediate safety/risk issues were reported or identified.  Explained the role of the [facility] and provided informational handout and contact information for the [facility].     Pt has been in the ED multiple times for chest pain. Pt admits that due to family problems and covid -19 he has been more depressed. Pt states that he has stopped all of his medications due to his pcp dying and a nurse telling him to go off of everything.  Reviewed safety with Patient. Patient denies any current SI,plans or intentions.  Pt is starting a new job on Monday. Pt is not feeling heard in the ED. Talked about how anxiety and depression could be a factor in some chest pain. Pt reports that he wants to try medication for his diabetes and start his new job to see if that helps his mental health symptoms. Will call back if he wants to schedule. Pt wanted to talk to pcp about medication and his chest pains.     BRANDEE Tapia, Behavioral Health Clinician

## 2022-08-03 NOTE — PATIENT INSTRUCTIONS
The dose should be increased gradually to minimize GI adverse effects. For the first 7 days, take 500mg every day. After 7 days, If tolerating medication, okay to increase to 500mg twice a day (one tablet with breakfast and one tablet with dinner). Please make an appointment to be seen again in 3 months with labs prior to your appointment.

## 2022-08-03 NOTE — PROGRESS NOTES
"  Assessment & Plan     (E11.9) Type 2 diabetes mellitus without complication, without long-term current use of insulin (H)  (primary encounter diagnosis)  Comment: Pt has hx of uncontrolled DM. Last A1C 2 weeks ago was 9.6%. Agreeable to starting metformin today and accepting referral for diabetes education. He will need to be seen again in 3 months to monitor A1C and for addition of other antidiabetic agents and initiation of a statin.   Plan: metFORMIN (GLUCOPHAGE XR) 500 MG 24 hr tablet,         AMB Adult Diabetes Educator Referral            Review of prior external note(s) from - CareEverywhere information from [facility] reviewed  45 minutes spent on the date of the encounter doing chart review, history and exam, documentation and further activities per the note       BMI:   Estimated body mass index is 31.71 kg/m  as calculated from the following:    Height as of 7/24/22: 1.88 m (6' 2\").    Weight as of this encounter: 112 kg (247 lb).       Depression Screening Follow Up    PHQ 8/3/2022   PHQ-9 Total Score 10   Q9: Thoughts of better off dead/self-harm past 2 weeks Not at all     Last PHQ-9 8/3/2022   1.  Little interest or pleasure in doing things 2   2.  Feeling down, depressed, or hopeless 2   3.  Trouble falling or staying asleep, or sleeping too much 2   4.  Feeling tired or having little energy 2   5.  Poor appetite or overeating 0   6.  Feeling bad about yourself 2   7.  Trouble concentrating 0   8.  Moving slowly or restless 0   Q9: Thoughts of better off dead/self-harm past 2 weeks 0   PHQ-9 Total Score 10       Follow Up Actions Taken  Patient counseled, no additional follow up at this time.  Patient declined referral.     Return in about 3 months (around 11/3/2022).    GUNNAR [facility] MISSAEL Butterfield is a 52 year old, presenting for the following health issues:  Hospital F/U    HPI     Pt is fairly agitated today at visit. States he was told by " "\"some nurse in [place] to quit his job and stop taking his medications.\" He was a full time  at a place where he thinks people were smoking meth. He was in the ER on ,  and  for symptoms of chest discomfort and throat discomfort. He is working on appealing the costs from his ED visits as he refuses to pay for this because he believes they ordered incorrect and faulty tests. He referred to this \"nurse\" many times during the visit. I am unable to find any documentation of these \"nurse calls.\" He has been off medication for T2DM for 7 years.     Pt used to follow with Dr. Layne of [facility] but he states this doctor has since . However, it doesn't appear that this doctor has passed. 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     He is starting a new job as a  at [employer] next week and hopes this will help him feel less anxious and depressed. We talked about him having him see psychiatry and had him spend some time with the behavioral health counselor in our office today. He is willing to think about mental health therapy at the next visit.       ED/UC Followup:    [facility]  [facility]  Date of visit: , ,   Reason for visit: chest pain  Current Status: feels good today, last  Sx came back, layed down then was fine to today      Review of Systems   Constitutional: Negative for chills, diaphoresis and fever.   Respiratory: Negative for chest tightness and shortness of breath.    Cardiovascular: Negative for chest pain.   Gastrointestinal: Negative for constipation, diarrhea, hematochezia, nausea and vomiting.   Neurological: Negative for numbness and paresthesias.   Psychiatric/Behavioral: Positive for agitation. The patient is nervous/anxious.           Objective    BP (!) 140/78 (BP Location: Left arm, Patient Position: Sitting, Cuff Size: Adult Large)   Pulse 83   Temp 98.4  F (36.9  C) (Oral)   Resp 16   Wt 112 " kg (247 lb)   SpO2 97%   BMI 31.71 kg/m    Body mass index is 31.71 kg/m .  Physical Exam  Constitutional:       General: He is not in acute distress.     Appearance: Normal appearance. He is normal weight. He is not ill-appearing, toxic-appearing or diaphoretic.   Neurological:      Mental Status: He is alert.   Psychiatric:         Attention and Perception: Attention and perception normal.         Mood and Affect: Mood is anxious.         Behavior: Behavior is agitated.         Thought Content: Thought content is delusional.         Judgment: Judgment is inappropriate.                      .  ..  Answers for HPI/ROS submitted by the patient on 8/3/2022  If you checked off any problems, how difficult have these problems made it for you to do your work, take care of things at home, or get along with other people?: Not difficult at all  PHQ9 TOTAL SCORE: 10

## 2022-08-11 ENCOUNTER — VIRTUAL VISIT (OUTPATIENT)
Dept: EDUCATION SERVICES | Facility: CLINIC | Age: 52
End: 2022-08-11
Payer: COMMERCIAL

## 2022-08-11 DIAGNOSIS — E11.9 TYPE 2 DIABETES MELLITUS WITHOUT COMPLICATION, WITHOUT LONG-TERM CURRENT USE OF INSULIN (H): ICD-10-CM

## 2022-08-11 DIAGNOSIS — Z91.199 NO-SHOW FOR APPOINTMENT: Primary | ICD-10-CM

## 2022-08-11 NOTE — PROGRESS NOTES
Called pt for appointment. He reported being busy and is going to call back to reschedule. Reports he has the number to call scheduling.     Malou Worthington RD, LD, [facility]

## 2022-08-11 NOTE — LETTER
8/11/2022         RE: Scout Perez  [address] [facility] [address]        Dear Colleague,    Thank you for referring your patient, Scout Perez, to the [facility]. Please see a copy of my visit note below.    Called pt for appointment. He reported being busy and is going to call back to reschedule. Reports he has the number to call scheduling.     Malou Worthington RD, LD, [facility]

## 2022-08-13 ENCOUNTER — HOSPITAL ENCOUNTER (EMERGENCY)
Facility: CLINIC | Age: 52
Discharge: HOME | End: 2022-08-13
Attending: EMERGENCY MEDICINE | Admitting: EMERGENCY MEDICINE
Payer: COMMERCIAL

## 2022-08-13 ENCOUNTER — NURSE TRIAGE (OUTPATIENT)
Dept: NURSING | Facility: CLINIC | Age: 52
End: 2022-08-13

## 2022-08-13 ENCOUNTER — APPOINTMENT (OUTPATIENT)
Dept: GENERAL RADIOLOGY | Facility: CLINIC | Age: 52
End: 2022-08-13
Attending: EMERGENCY MEDICINE
Payer: COMMERCIAL

## 2022-08-13 VITALS
RESPIRATION RATE: 18 BRPM | HEART RATE: 85 BPM | TEMPERATURE: 98.7 F | OXYGEN SATURATION: 97 % | SYSTOLIC BLOOD PRESSURE: 176 MMHG | DIASTOLIC BLOOD PRESSURE: 105 MMHG | BODY MASS INDEX: 32.1 KG/M2 | WEIGHT: 250 LBS

## 2022-08-13 DIAGNOSIS — R07.9 CHEST PAIN, UNSPECIFIED TYPE: ICD-10-CM

## 2022-08-13 LAB
ALBUMIN SERPL-MCNC: 4 G/DL (ref 3.4–5)
ALP SERPL-CCNC: 71 U/L (ref 40–150)
ALT SERPL W P-5'-P-CCNC: 49 U/L (ref 0–70)
ANION GAP SERPL CALCULATED.3IONS-SCNC: 11 MMOL/L (ref 3–14)
AST SERPL W P-5'-P-CCNC: 28 U/L (ref 0–45)
BASOPHILS # BLD AUTO: 0.1 10E3/UL (ref 0–0.2)
BASOPHILS NFR BLD AUTO: 1 %
BILIRUB SERPL-MCNC: 0.7 MG/DL (ref 0.2–1.3)
BUN SERPL-MCNC: 19 MG/DL (ref 7–30)
CALCIUM SERPL-MCNC: 9.1 MG/DL (ref 8.5–10.1)
CHLORIDE BLD-SCNC: 102 MMOL/L (ref 94–109)
CO2 SERPL-SCNC: 23 MMOL/L (ref 20–32)
CREAT SERPL-MCNC: 0.61 MG/DL (ref 0.66–1.25)
EOSINOPHIL # BLD AUTO: 0.1 10E3/UL (ref 0–0.7)
EOSINOPHIL NFR BLD AUTO: 1 %
ERYTHROCYTE [DISTWIDTH] IN BLOOD BY AUTOMATED COUNT: 12 % (ref 10–15)
GFR SERPL CREATININE-BSD FRML MDRD: >90 ML/MIN/1.73M2
GLUCOSE BLD-MCNC: 408 MG/DL (ref 70–99)
HCT VFR BLD AUTO: 47.9 % (ref 40–53)
HGB BLD-MCNC: 16.7 G/DL (ref 13.3–17.7)
IMM GRANULOCYTES # BLD: 0 10E3/UL
IMM GRANULOCYTES NFR BLD: 0 %
LIPASE SERPL-CCNC: 97 U/L (ref 73–393)
LYMPHOCYTES # BLD AUTO: 2.2 10E3/UL (ref 0.8–5.3)
LYMPHOCYTES NFR BLD AUTO: 30 %
MCH RBC QN AUTO: 28.7 PG (ref 26.5–33)
MCHC RBC AUTO-ENTMCNC: 34.9 G/DL (ref 31.5–36.5)
MCV RBC AUTO: 82 FL (ref 78–100)
MONOCYTES # BLD AUTO: 0.5 10E3/UL (ref 0–1.3)
MONOCYTES NFR BLD AUTO: 7 %
NEUTROPHILS # BLD AUTO: 4.6 10E3/UL (ref 1.6–8.3)
NEUTROPHILS NFR BLD AUTO: 61 %
NRBC # BLD AUTO: 0 10E3/UL
NRBC BLD AUTO-RTO: 0 /100
PLATELET # BLD AUTO: 224 10E3/UL (ref 150–450)
POTASSIUM BLD-SCNC: 3.5 MMOL/L (ref 3.4–5.3)
PROT SERPL-MCNC: 7.6 G/DL (ref 6.8–8.8)
RBC # BLD AUTO: 5.82 10E6/UL (ref 4.4–5.9)
SODIUM SERPL-SCNC: 136 MMOL/L (ref 133–144)
TROPONIN I SERPL HS-MCNC: 12 NG/L
WBC # BLD AUTO: 7.5 10E3/UL (ref 4–11)

## 2022-08-13 PROCEDURE — 36415 COLL VENOUS BLD VENIPUNCTURE: CPT | Performed by: EMERGENCY MEDICINE

## 2022-08-13 PROCEDURE — 80053 COMPREHEN METABOLIC PANEL: CPT | Performed by: EMERGENCY MEDICINE

## 2022-08-13 PROCEDURE — 99285 EMERGENCY DEPT VISIT HI MDM: CPT | Mod: 25

## 2022-08-13 PROCEDURE — 83690 ASSAY OF LIPASE: CPT | Performed by: EMERGENCY MEDICINE

## 2022-08-13 PROCEDURE — 84484 ASSAY OF TROPONIN QUANT: CPT | Performed by: EMERGENCY MEDICINE

## 2022-08-13 PROCEDURE — 71046 X-RAY EXAM CHEST 2 VIEWS: CPT

## 2022-08-13 PROCEDURE — 93005 ELECTROCARDIOGRAM TRACING: CPT

## 2022-08-13 PROCEDURE — 85025 COMPLETE CBC W/AUTO DIFF WBC: CPT | Performed by: EMERGENCY MEDICINE

## 2022-08-13 PROCEDURE — 82040 ASSAY OF SERUM ALBUMIN: CPT | Performed by: EMERGENCY MEDICINE

## 2022-08-13 ASSESSMENT — ENCOUNTER SYMPTOMS
DIARRHEA: 0
SHORTNESS OF BREATH: 0
APPETITE CHANGE: 0
FEVER: 0
BLOOD IN STOOL: 0
COUGH: 0
PALPITATIONS: 0
ARTHRALGIAS: 1
VOMITING: 1

## 2022-08-13 ASSESSMENT — ACTIVITIES OF DAILY LIVING (ADL): ADLS_ACUITY_SCORE: 35

## 2022-08-13 NOTE — ED PROVIDER NOTES
History   Chief Complaint:  Chest Pain     The history is provided by the patient and medical records.      Scout Perez is a 52 year old male with history of type 2 diabetes mellitus, hypertension, depression, and anxiety who presents with chest pain. The patient states he had onset of chest pain towards the end of July 2022. He has presented to the emergency department multiple times since then due to intermittent episodes of left-sided chest pressure. He states episodes last approximately 6-7 hours, and resolve on their own. He denies any exertional chest pain or other exacerbating factors. He states onset of episodes are sudden and resolve on their own. He has had three episodes of chest pain prior to today's visit, and he has had full work ups done during that time. He denies any chest pain currently. He denies shortness of breath. He states he normally experiences throat tightness and eructation with his episodes. He notes chest pain resolves after eructation. He denies any symptoms currently. He endorses multiple episodes of emesis, however no hematemesis. He denies palpitations, racing heart rate, or irregular rhythms. He denies any fevers, cough, diarrhea, black or bloody stools. He denies any recent changes in diet. He states he stopped taking medication for his hypertension or cholesterol approximately 6 years ago. He is currently taking metformin for his diabetes mellitus, and he does not check his blood sugars at home. He states he lost approximately 50 pounds. He endorses mild left shoulder soreness, however believes it may be musculoskeletal in nature. He denies history of cardiac stress test. He denies recent long distance travel. He denies DVT/PE or cardiac stents. He denies tobacco use, alcohol use, or illicit drug use.     This is the patient's fourth emergency department visit. He was seen on 07/23, 07/24, and 07/25 for the same complaints. Full work ups included EKG, chest x-ray, and blood  work. Patient was discharged with follow up with PCP.     Review of Systems   Constitutional: Negative for appetite change and fever.   Respiratory: Negative for cough and shortness of breath.    Cardiovascular: Positive for chest pain (resolved). Negative for palpitations.   Gastrointestinal: Positive for vomiting. Negative for blood in stool and diarrhea.   Musculoskeletal: Positive for arthralgias (left shoulder).   All other systems reviewed and are negative.    Allergies:  The patient has no known allergies.     Medications:  Metformin    Past Medical History:     Diabetes mellitus, type 2   Depression  Primary insomnia  Hypertension   Hypercholesterolemia    Anxiety     Past Surgical History:    Percutaneous pinning phalanx fracture of right hand      Family History:    Mother: Hypertension, Heart Disease  Father: Mental Illness, Kidney Disease   Brother: Diabetes Mellitus, Alcoholism     Social History:  The patient was unaccompanied to the emergency department.  Alcohol Use: No  Tobacco Use: No  Drug Use: No  PCP: No Ref-Primary, Physician     Physical Exam     Patient Vitals for the past 24 hrs:   BP Temp Temp src Pulse Resp SpO2 Weight   08/13/22 1400 (!) 176/105 -- -- 85 18 97 % --   08/13/22 1257 (!) 200/100 98.7  F (37.1  C) Temporal 92 14 99 % 113.4 kg (250 lb)     Physical Exam  SKIN:  Warm, dry.  HEMATOLOGIC/IMMUNOLOGIC/LYMPHATIC:  No pallor.  EYES:  Conjunctivae normal.  CARDIOVASCULAR:  Regular rate and rhythm.  No murmur.  RESPIRATORY:  No respiratory distress, breath sounds equal and normal.  GASTROINTESTINAL:  Soft, nontender abdomen.  MUSCULOSKELETAL:  Overweight body habitus.  NEUROLOGIC:  Alert, conversant.  PSYCHIATRIC:  Normal mood.    Emergency Department Course   ECG  ECG taken at 1252, ECG read at 1418  Normal sinus rhythm  Right atrial enlargement  Borderline ECG  No significant change as compared to prior dated 07/25/22  Rate 92 bpm. MA interval 156. QRS duration 94. QT/QTc 380/469.  P-R-T axes 65 38 55.    Imaging:  XR Chest 2 Views   Final Result   IMPRESSION: Negative chest. No residual right apical opacity.      Report per radiology    Laboratory:  Labs Ordered and Resulted from Time of ED Arrival to Time of ED Departure   COMPREHENSIVE METABOLIC PANEL - Abnormal       Result Value    Sodium 136      Potassium 3.5      Chloride 102      Carbon Dioxide (CO2) 23      Anion Gap 11      Urea Nitrogen 19      Creatinine 0.61 (*)     Calcium 9.1      Glucose 408 (*)     Alkaline Phosphatase 71      AST 28      ALT 49      Protein Total 7.6      Albumin 4.0      Bilirubin Total 0.7      GFR Estimate >90     TROPONIN I - Normal    Troponin I High Sensitivity 12     LIPASE - Normal    Lipase 97     CBC WITH PLATELETS AND DIFFERENTIAL    WBC Count 7.5      RBC Count 5.82      Hemoglobin 16.7      Hematocrit 47.9      MCV 82      MCH 28.7      MCHC 34.9      RDW 12.0      Platelet Count 224      % Neutrophils 61      % Lymphocytes 30      % Monocytes 7      % Eosinophils 1      % Basophils 1      % Immature Granulocytes 0      NRBCs per 100 WBC 0      Absolute Neutrophils 4.6      Absolute Lymphocytes 2.2      Absolute Monocytes 0.5      Absolute Eosinophils 0.1      Absolute Basophils 0.1      Absolute Immature Granulocytes 0.0      Absolute NRBCs 0.0        Emergency Department Course:     Reviewed:  I reviewed nursing notes, vitals, past medical history and Care Everywhere    Assessments:  1357 I obtained history and examined the patient as noted above.     Disposition:  The patient was discharged to home.     Impression & Plan   Medical Decision Making:  This patient presents again to the emergency department with concern of cardiac chest pain.  That of course was a consideration given his symptoms.  Evaluation again was reassuring.  Clinically and symptomatically not consistent with pulmonary embolism nor aortic dissection so test for either were not pursued.  Benign abdomen on exam.  I do not  believe the patient required admission to the hospital for further urgent cardiac testing.  Advised outpatient primary care follow-up for further evaluation.    Diagnosis:    ICD-10-CM    1. Chest pain, unspecified type  R07.9      Scribe Disclosure:  I, Carol Alanis, am serving as a scribe at 1:37 PM on 8/13/2022 to document services personally performed by Peng Yun MD based on my observations and the provider's statements to me.     Peng Yun MD  11/18/22 1310

## 2022-08-13 NOTE — ED TRIAGE NOTES
Patient reports severe chest pain after eating bowl of spaghetti. States he's been seen before for similar pain.      Triage Assessment     Row Name 08/13/22 1258       Triage Assessment (Adult)    Airway WDL WDL       Respiratory WDL    Respiratory WDL WDL       Cardiac WDL    Cardiac WDL X;all    Cardiac Rhythm apical pulse regular       Chest Pain Assessment    Chest Pain Location epigastric;midsternal    Precipitating Factors eating

## 2022-08-13 NOTE — ED PROVIDER NOTES
Rapid Assessment Note    History:   Scout Perez is a 52 year old male who presents with chest pain, burping and shortness of breath beginning at about 1030 this morning. He mentions feeling like his throat is closing, ambulatory difficulty. He presents today because he was worried of stroke and wanted to get checked out. He claims that he was here three times the last month for similar symptoms and was discharged each time.  Chest pain feels better with holding pressure to it.    Exam:   General: Well-nourished  Eyes: PERRL, conjunctivae pink no scleral icterus or conjunctival injection  ENT:  Moist mucus membranes, posterior oropharynx clear without erythema or exudates  Respiratory:  Lungs clear to auscultation bilaterally, no crackles/rubs/wheezes.  Good air movement  CV: Normal rate and rhythm, no murmurs/rubs/gallops. Burping  GI:  Abdomen soft and non-distended.  Normoactive BS.  No tenderness, guarding or rebound  Skin: Warm, dry.  No rashes or petechiae  Musculoskeletal: No peripheral edema or calf tenderness  Neuro: Alert and oriented to person/place/time. PERRL, EOMI no nystagmus, no aphasia/facial droop/dysarthria, tongue midline, symmetric palatal elevation, normal strength at SCM/trapezius/BUE/BLE, normal coordination to FNF at BUE, normal casual gait, negative romberg, sensation intact to LT over face/BUE/BLE  Psychiatric: Anxious affect      Plan of Care:   I evaluated the patient and developed an initial plan of care. I discussed this plan and explained that I, or one of my partners, would be returning to complete the evaluation.     I, Delvin Orona, am serving as a scribe to document services personally performed by Dr. Sarah Velarde MD based on my observations and the provider's statements to me.         Sarah Velarde MD  08/13/22 1693

## 2022-08-13 NOTE — ED NOTES
"Pt currently states he is feeling improved. Currently denies any CP. Reports some \"acheyness\" to L side of neck. Pt states he has had 4 previous ER visits for similar chest pain.   "

## 2022-08-13 NOTE — TELEPHONE ENCOUNTER
"TRIAGE CALL - Is this a 2nd Level Triage? NO    Nurse Triage SBAR - Caller   Situation: chest pain   Background:    Has been in the ER in July X3 with same symptoms and they found nothing  Works nights - new job - started August 8rd  Diabetes- started again Metformin on August 3rd  Mixed anxiety and depressed mood -Pt has a learning disability  Assessment:  Hot and cold  Nervous  Throat dries up  Difficulty with breathing  Pain underneath his breast bone on his left side  Pain is worse when laying down -  Feels sharp pain  Pt has burp several times during the call - every times he burp;   \"pt feels better after each one of it\"  Vomiting last Thursday - skip work due to stomach discomfort  Pt stopped taking Metformin - due to stomach discomfort   eating pizza last night and spaghettis w/ sauce this AM    Measures taken: None - does not have heartburn medication at home  Patient is able to speak in full long sentences without getting short of breath.    TRIAGE questions are very challenging with pt. (45 minutes call)  Patient is concern, confused and very anxious about this - base on protocol he should be seen at the ER - but he will not go, Has been treated badly at ER  Last 3 times he was there.    Pt is asking questions repeatedly and does not allowed TRIAGE NURSE to ask questions, RN unable to explained or advised pt, constantly interrupts nurse. -Pt did apologized several times for being rude.     Pt does not want to drove right now, feels something is wrong with him and does not feel safe. May call his parents to drive him to see a Doctor today     Pt wants his PCP GUNNAR Najera to come and meet him someplace and evaluated him     Pt wanted to go to his Clinic in [place] and speak with Dania today - explained that Clinic is closed today.    Pt is between calling 911 or making an appt with Dania, does not want to deal with anyone else    Recommended Disposition per protocol ER now. RN is Unsure what pt will do  Pt " "asked to send a note to his PCP for her to call him on Monday  Pt s PCP/Clinic NEW for him - Dania Adams APRN CNP  Internal Medicine - [facility]   Note sent to PCP teams.   Jenna Melgar RN [place] Nurse Advisor,  12:19 PM 8/13/2022          Reason for Disposition    Taking a deep breath makes pain worse    Additional Information    Negative: SEVERE difficulty breathing (e.g., struggling for each breath, speaks in single words)    Negative: Difficult to awaken or acting confused (e.g., disoriented, slurred speech)    Negative: Shock suspected (e.g., cold/pale/clammy skin, too weak to stand, low BP, rapid pulse)    Negative: Passed out (i.e., lost consciousness, collapsed and was not responding)    Negative: [1] Chest pain lasts > 5 minutes AND [2] age > 44    Negative: [1] Chest pain lasts > 5 minutes AND [2] age > 30 AND [3] one or more cardiac risk factors (e.g., diabetes, high blood pressure, high cholesterol, smoker, or strong family history of heart disease)    Negative: [1] Chest pain lasts > 5 minutes AND [2] history of heart disease (i.e., angina, heart attack, heart failure, bypass surgery, takes nitroglycerin)    Negative: [1] Chest pain lasts > 5 minutes AND [2] described as crushing, pressure-like, or heavy    Negative: Heart beating < 50 beats per minute OR > 140 beats per minute    Negative: Visible sweat on face or sweat dripping down face    Negative: Sounds like a life-threatening emergency to the triager    Negative: Followed a chest injury    Negative: SEVERE chest pain    Negative: [1] Chest pain (or \"angina\") comes and goes AND [2] is happening more often (increasing in frequency) or getting worse (increasing in severity) (Exception: chest pains that last only a few seconds)    Negative: Pain also in shoulder(s) or arm(s) or jaw (Exception: pain is clearly made worse by movement)    Negative: Difficulty breathing    Negative: Dizziness or lightheadedness    Negative: Coughing up " "blood    Negative: Cocaine use within last 3 days    Negative: Major surgery in past month    Negative: Hip or leg fracture (broken bone) in past month (or had cast on leg or ankle in past month)    Negative: Illness requiring prolonged bedrest in past month (e.g., immobilization, long hospital stay)    Negative: Long-distance travel in past month (e.g., car, bus, train, plane; with trip lasting 6 or more hours)    Negative: History of prior \"blood clot\" in leg or lungs (i.e., deep vein thrombosis, pulmonary embolism)    Negative: History of inherited increased risk of blood clots (e.g., Factor 5 Leiden, Anti-thrombin 3, Protein C or Protein S deficiency, Prothrombin mutation)    Negative: Cancer treatment in past six months (or has cancer now)    Negative: [1] Chest pain lasts > 5 minutes AND [2] occurred in past 3 days (72 hours) (Exception: feels exactly the same as previously diagnosed heartburn and has accompanying sour taste in mouth)    Protocols used: CHEST PAIN-A-AH      "

## 2022-08-15 ENCOUNTER — TELEPHONE (OUTPATIENT)
Dept: INTERNAL MEDICINE | Facility: CLINIC | Age: 52
End: 2022-08-15

## 2022-08-15 NOTE — TELEPHONE ENCOUNTER
Reason for Call:  Other appointment    Detailed comments: Last 4 times ER, has chest pains, closed throat, Wed July 27 last seen pcp, on diabetes med and patient want to stop taking, July 22 symptoms stop and last week started pain in chest and symtoms. Patient needs Follow up, patient wants to know why he is having problems, Paitent just started a new job. Triage notes.    Phone Number Patient can be reached at: Home number on file [phone] (home)    Best Time: Any time.    Can we leave a detailed message on this number? YES    Call taken on 8/15/2022 at 8:33 AM by Bryson Solorio

## 2022-08-15 NOTE — TELEPHONE ENCOUNTER
I am sorry, please keep these appointments available for virtual and same day at this time as I believe many patients have been upset about same day visits not being available.    I recently saw pt on 7/27. He has had thorough cardiac workup for chest pain which has not been shown to be cardiac in nature. He shared with me at the last visit that he was suing the ER as they ordered wrongful tests on him and didn't provide good care. I have encouraged patient to establish care with psychiatry or psychology.     Thanks!  GUNNAR Arthur, CNP   [facility]

## 2022-08-15 NOTE — TELEPHONE ENCOUNTER
Left message for patient to call back. Please assist patient with scheduling an ER f/u appointment.

## 2022-08-15 NOTE — TELEPHONE ENCOUNTER
Of course. I think it may be best for him to get in with another provider or another clinic if he is able to do so sooner than 9/7. This would likely be best to prevent him from going to the ER again.     Thanks!

## 2022-08-15 NOTE — TELEPHONE ENCOUNTER
Next available hospital follow up appointment with Dania Adams is 9/7/22. Dania has a virtual visit and a Same Day appointment available next Monday 8/22/22. Could either of these be used for patient?

## 2022-08-15 NOTE — TELEPHONE ENCOUNTER
Patient was advised to f/u with primary care provider office and is requesting appointment so we need to offer an appointment. Do you think it would be best to offer this appointment with you even if not until 9/7/22 or schedule sooner with another provider?

## 2022-08-16 ENCOUNTER — TELEPHONE (OUTPATIENT)
Dept: INTERNAL MEDICINE | Facility: CLINIC | Age: 52
End: 2022-08-16

## 2022-08-16 NOTE — TELEPHONE ENCOUNTER
Patient Quality Outreach    Patient is due for the following:   Diabetes -  Eye Exam, Microalbumin and Foot Exam  Physical Preventive Adult Physical    Next Steps:   Patient has upcoming appointment, these items will be addressed at that time.    Type of outreach:    Chart review performed, no outreach needed.      Questions for provider review:    None     Gail Hudson, [facility]

## 2022-08-16 NOTE — TELEPHONE ENCOUNTER
Next 5 appointments (look out 90 days)    Aug 24, 2022  4:30 PM  (Arrive by 4:10 PM)  Provider Visit with GUNNAR Odell CNP  [facility] ([facility] ) [address] [facility] [id number] [phone]

## 2022-08-24 ENCOUNTER — OFFICE VISIT (OUTPATIENT)
Dept: INTERNAL MEDICINE | Facility: CLINIC | Age: 52
End: 2022-08-24
Payer: COMMERCIAL

## 2022-08-24 VITALS
DIASTOLIC BLOOD PRESSURE: 92 MMHG | HEIGHT: 74 IN | HEART RATE: 92 BPM | TEMPERATURE: 98.2 F | RESPIRATION RATE: 20 BRPM | WEIGHT: 242 LBS | SYSTOLIC BLOOD PRESSURE: 150 MMHG | BODY MASS INDEX: 31.06 KG/M2 | OXYGEN SATURATION: 98 %

## 2022-08-24 DIAGNOSIS — E11.9 TYPE 2 DIABETES MELLITUS WITHOUT COMPLICATION, WITHOUT LONG-TERM CURRENT USE OF INSULIN (H): ICD-10-CM

## 2022-08-24 DIAGNOSIS — R07.89 OTHER CHEST PAIN: Primary | ICD-10-CM

## 2022-08-24 DIAGNOSIS — I10 ESSENTIAL HYPERTENSION: ICD-10-CM

## 2022-08-24 PROCEDURE — 99214 OFFICE O/P EST MOD 30 MIN: CPT

## 2022-08-24 NOTE — PROGRESS NOTES
"  Assessment & Plan     (R07.89) Other chest pain  (primary encounter diagnosis)  Comment: See H&P for discussion regarding chest pain below.   Plan: Adult Cardiology [facility] Nurys Referral            (I10) Essential hypertension  Comment: BP uncontrolled at 150/92. Pt refuses to take any medication since he \"feels great.\" We discussed importance of treating HTN as it significantly increases risk for heart attack and stroke.       (E11.9) Type 2 diabetes mellitus without complication, without long-term current use of insulin (H)  Comment: Patient's T2DM is severely uncontrolled. Last BG in the ED was 400. When I last saw him in clinic in July of 2022, I started him on Metformin. He has since discontinued this because he was told by a nurse that Metformin causes heart attacks. I advised him this is not the case but he is unwilling to take any medications besides his vitamins and his aspirin at this time. Last A1C was 9.6% on 07/23/22.            BMI:   Estimated body mass index is 31.07 kg/m  as calculated from the following:    Height as of this encounter: 1.88 m (6' 2\").    Weight as of this encounter: 109.8 kg (242 lb).   Weight management plan: Discussed healthy diet and exercise guidelines    No follow-ups on file.    Dania Adams, GUNNAR CNP  [facility]    BP today is 182/114 and 150/92.     Faustino Butterfield is a 52 year old, presenting for the following health issues:  ER F/U      HPI     ED/UC Followup:    Facility[facility]  Critical access hospital  Date of visit: 8/13/22  Reason for visit: chest pain  Current Status: improved    I recently saw patient in my office on August 3rd after he had 3 consecutive visits to the ED. This recent visit on 8/13/22 was his 4th recent visit. He is worried he has a clog in his heart. His dad has a stethoscope and he thinks he has something wrong with his heart. He is off of medications and feeling great. He said \"he is not willing to do any prescription medications " "today and he wants his heart fixed.\" He does not want to take anything except his vitamins and he will be fine. He recently talked to a nurse through his insurance who told him to call 911 which is when he went to the ER. He has been seen multiple times in the ED for chest pain and cardiac workup has been unrevealing so far. I will refer him to cardiology for now as he is very concerned about his heart and this will hopefully provide reassurance. Could think about ordering a stress test if patient is willing to do this.         Objective    BP (!) 182/114   Pulse 92   Temp 98.2  F (36.8  C)   Resp 20   Ht 1.88 m (6' 2\")   Wt 109.8 kg (242 lb)   SpO2 98%   BMI 31.07 kg/m    Body mass index is 31.07 kg/m .     Physical Exam  Constitutional:       General: He is not in acute distress.     Appearance: Normal appearance. He is not ill-appearing, toxic-appearing or diaphoretic.   Eyes:      Conjunctiva/sclera: Conjunctivae normal.   Cardiovascular:      Rate and Rhythm: Normal rate and regular rhythm.   Pulmonary:      Effort: Pulmonary effort is normal.   Skin:     General: Skin is warm and dry.   Neurological:      Mental Status: He is alert and oriented to person, place, and time.   Psychiatric:         Attention and Perception: He is inattentive.         Mood and Affect: Affect is blunt.         Speech: Speech is rapid and pressured.         Behavior: Behavior is hyperactive.         Thought Content: Thought content is delusional.                         .  ..  "

## 2022-08-24 NOTE — LETTER
August 24, 2022      Scout Perez  [address] [facility] [address]        To Whom It May Concern:    Scout Perez  was seen on 8/24/22.  Please excuse him on 8/24/22 due to visit for ER follow up due to ongoing chest pain. We will be referring him to cardiology.         Sincerely,        GUNNAR Arthur CNP

## 2022-12-12 ENCOUNTER — TELEPHONE (OUTPATIENT)
Dept: BEHAVIORAL HEALTH | Facility: CLINIC | Age: 52
End: 2022-12-12

## 2022-12-12 NOTE — TELEPHONE ENCOUNTER
Patient outreach:    Pt was referred to [facility] by colleague. Colleague had already called and left pt a message stating that her schedule was full and that pt is being referred to different provider. [facility] also called on this date #[phone] and left a voicemail again following up stating that if pt is interested, [facility] is willing and able to meet with him. [facility] provided contact information for the clinic if pt has questions or concerns.     FARHAN Velazquez, [facility]  Behavioral Health Clinician  [facility] [address], [address], [address], [address], [place], [place], [address]  [phone]
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
## 2022-12-22 ENCOUNTER — OFFICE VISIT (OUTPATIENT)
Dept: URGENT CARE | Facility: URGENT CARE | Age: 52
End: 2022-12-22
Payer: COMMERCIAL

## 2022-12-22 VITALS
DIASTOLIC BLOOD PRESSURE: 109 MMHG | OXYGEN SATURATION: 97 % | TEMPERATURE: 97.1 F | HEART RATE: 130 BPM | RESPIRATION RATE: 40 BRPM | SYSTOLIC BLOOD PRESSURE: 152 MMHG

## 2022-12-22 DIAGNOSIS — R07.9 ACUTE CHEST PAIN: Primary | ICD-10-CM

## 2022-12-22 DIAGNOSIS — I10 BENIGN ESSENTIAL HYPERTENSION: ICD-10-CM

## 2022-12-22 DIAGNOSIS — E11.9 TYPE 2 DIABETES MELLITUS WITHOUT COMPLICATION, WITHOUT LONG-TERM CURRENT USE OF INSULIN (H): ICD-10-CM

## 2022-12-22 PROBLEM — F81.9 LEARNING DISABILITY: Status: ACTIVE | Noted: 2022-12-22

## 2022-12-22 PROBLEM — F51.01 PRIMARY INSOMNIA: Status: ACTIVE | Noted: 2019-12-08

## 2022-12-22 PROBLEM — F33.1 MAJOR DEPRESSIVE DISORDER, RECURRENT EPISODE, MODERATE (H): Status: ACTIVE | Noted: 2019-12-08

## 2022-12-22 PROCEDURE — 99215 OFFICE O/P EST HI 40 MIN: CPT | Performed by: NURSE PRACTITIONER

## 2022-12-22 PROCEDURE — 93000 ELECTROCARDIOGRAM COMPLETE: CPT | Performed by: NURSE PRACTITIONER

## 2022-12-22 NOTE — PATIENT INSTRUCTIONS
Family to drive you directly to the emergency room.    You may be having a heart attack and this is an emergency please do not stop or go directly there.

## 2022-12-22 NOTE — PROGRESS NOTES
Chief Complaint   Patient presents with     Urgent Care     Present for left side chest pain, dizziness and headache and burping alot that started this morning.         ICD-10-CM    1. Acute chest pain  R07.9 EKG 12-lead complete w/read - Clinics      2. Benign essential hypertension  I10       3. Type 2 diabetes mellitus without complication, without long-term current use of insulin (H)  E11.9       Patient with history of diabetes mellitus which is uncontrolled.  Last hemoglobin A1c was in July of this year and was 9.6.  He was taking metformin but stopped this nearly a year ago because of the side effects.  He also has a history of hypertension and is not taking any medication at this time.  Evidently his primary care provider passed away and he never found another one.  He is exposed to secondhand smoke every day and his father has a history of coronary artery disease.    Due to this risk factors patient is advised to take an ambulance transport to the closest emergency room for immediate further assessment and care.  Patient declines ambulance transport but does have a family member here who will drive him directly to the emergency room at [facility] which is his hospital of choice.  Call placed to [facility] emergency room to inform them of patient's arrival.      Patient is advised of the risk of not taking ambulance transport which includes but is not limited to myocardial infarction, stroke, pneumothorax, death.    Medical Decision Making    Differential diagnosis for patient's chest pain include but is not limited to: Acute myocardial infarction, pneumothorax, pleurisy, pericarditis, myocarditis, asthma exacerbation, pneumonia, lung abscess, aortic dissection, thoracic aortic aneurysm, costochondritis, pulmonary embolus, rib fractures, muscular pain, GERD, cholecystitis, pancreatitis, diaphragmatic irritation.      EKG - Reviewed and interpreted by me sinus tachycardia, unchanged from previous  tracings    No results found for this or any previous visit (from the past 24 hour(s)).    Subjective     Scout Perez is an 52 year old male who presents to clinic today for chest pain, sharp that started on the left side of the chest and now is substernal. Started at 0730 this morning. He is also diabetic.       ROS: 10 point ROS neg other than the symptoms noted above in the HPI.       Objective    BP (!) 152/109 (BP Location: Right arm, Patient Position: Sitting, Cuff Size: Adult Regular)   Pulse (!) 130   Temp 97.1  F (36.2  C) (Tympanic)   Resp (!) 40   SpO2 97%   Nurses notes and VS have been reviewed.    Physical Exam       GENERAL APPEARANCE: healthy appearing, alert     EYES: PERRL, EOMI, sclera non-icteric     HENT: oral exam benign, mucus membranes intact, without ulcers or lesions     NECK: no adenopathy or asymmetry, thyroid normal to palpation     RESP: lungs clear to auscultation - no rales, rhonchi or wheezes     CV: regular rates and rhythm, no murmurs, rubs, or gallop     ABDOMEN:  soft, nontender, no HSM or masses and bowel sounds normal     MS: extremities normal- no gross deformities noted; normal muscle tone.     SKIN: no suspicious lesions or rashes     NEURO: Normal strength and tone, mentation intact and speech normal     PSYCH: anxious    Patient Instructions   Family to drive you directly to the emergency room.    You may be having a heart attack and this is an emergency please do not stop or go directly there.      GUNNAR Martin, CNP  [facility] Provider    The use of Dragon/WaveConnex dictation services may have been used to construct the content in this note; any grammatical or spelling errors are non-intentional. Please contact the author of this note directly if you are in need of any clarification.

## 2023-01-10 ENCOUNTER — TELEPHONE (OUTPATIENT)
Dept: INTERNAL MEDICINE | Facility: CLINIC | Age: 53
End: 2023-01-10

## 2023-01-10 NOTE — TELEPHONE ENCOUNTER
Patient Quality Outreach    Patient is due for the following:   Diabetes -  A1C, Eye Exam, Microalbumin and Foot Exam  Colon Cancer Screening  Depression  -  PHQ-9 needed  Physical Preventive Adult Physical      Topic Date Due     Pneumococcal Vaccine (2 - PCV) 11/08/2011     Zoster (Shingles) Vaccine (2 of 2) 10/19/2021       Next Steps:   Please schedule an annual Physical     Type of outreach:    Sent letter.      Questions for provider review:    None     Kelli Meeks LPN

## 2023-01-10 NOTE — LETTER
[place] [address]  [address]  [facility] [address]  Phone: [phone]        January 10, 2023      Scout Perez                                                                                                                                [address] [facility] [address]            Dear Mr. Perez,    We are conducting Patient Quality Outreach       You are due for the following:   Diabetes -  A1C, Eye Exam, Microalbumin and Foot Exam  Colon Cancer Screening  Depression  -  PHQ-9 needed  Physical Preventive Adult Physical      Topic Date Due     Pneumococcal Vaccine (2 - PCV) 11/08/2011     Zoster (Shingles) Vaccine (2 of 2) 10/19/2021         Please schedule an annual Physical , You can call [phone]    Thank you,      [facility]

## 2023-01-17 ENCOUNTER — PATIENT OUTREACH (OUTPATIENT)
Dept: INTERNAL MEDICINE | Facility: CLINIC | Age: 53
End: 2023-01-17
Payer: COMMERCIAL

## 2023-01-17 NOTE — TELEPHONE ENCOUNTER
Patient Quality Outreach    Patient is due for the following:   Diabetes -  Eye Exam and Foot Exam  Colon Cancer Screening  Physical Preventive Adult Physical      Topic Date Due     Pneumococcal Vaccine (2 - PCV) 11/08/2011     Zoster (Shingles) Vaccine (2 of 2) 10/19/2021       Next Steps:   Schedule a Adult Preventative    Type of outreach:    Sent letter.      Questions for provider review:    None     Kelli Meeks LPN

## 2023-01-23 ENCOUNTER — OFFICE VISIT (OUTPATIENT)
Dept: BEHAVIORAL HEALTH | Facility: CLINIC | Age: 53
End: 2023-01-23
Payer: COMMERCIAL

## 2023-01-23 DIAGNOSIS — F33.0 MAJOR DEPRESSIVE DISORDER, RECURRENT EPISODE, MILD (H): ICD-10-CM

## 2023-01-23 DIAGNOSIS — F41.1 GAD (GENERALIZED ANXIETY DISORDER): Primary | ICD-10-CM

## 2023-01-23 PROCEDURE — 90791 PSYCH DIAGNOSTIC EVALUATION: CPT | Performed by: MARRIAGE & FAMILY THERAPIST

## 2023-01-23 ASSESSMENT — PATIENT HEALTH QUESTIONNAIRE - PHQ9
SUM OF ALL RESPONSES TO PHQ QUESTIONS 1-9: 9
5. POOR APPETITE OR OVEREATING: SEVERAL DAYS

## 2023-01-23 ASSESSMENT — ANXIETY QUESTIONNAIRES
1. FEELING NERVOUS, ANXIOUS, OR ON EDGE: SEVERAL DAYS
GAD7 TOTAL SCORE: 10
GAD7 TOTAL SCORE: 10
3. WORRYING TOO MUCH ABOUT DIFFERENT THINGS: SEVERAL DAYS
5. BEING SO RESTLESS THAT IT IS HARD TO SIT STILL: MORE THAN HALF THE DAYS
6. BECOMING EASILY ANNOYED OR IRRITABLE: MORE THAN HALF THE DAYS
2. NOT BEING ABLE TO STOP OR CONTROL WORRYING: SEVERAL DAYS
7. FEELING AFRAID AS IF SOMETHING AWFUL MIGHT HAPPEN: MORE THAN HALF THE DAYS

## 2023-01-23 ASSESSMENT — COLUMBIA-SUICIDE SEVERITY RATING SCALE - C-SSRS
6. HAVE YOU EVER DONE ANYTHING, STARTED TO DO ANYTHING, OR PREPARED TO DO ANYTHING TO END YOUR LIFE?: NO
TOTAL  NUMBER OF INTERRUPTED ATTEMPTS LIFETIME: NO
1. HAVE YOU WISHED YOU WERE DEAD OR WISHED YOU COULD GO TO SLEEP AND NOT WAKE UP?: NO
2. HAVE YOU ACTUALLY HAD ANY THOUGHTS OF KILLING YOURSELF?: NO
ATTEMPT LIFETIME: NO
TOTAL  NUMBER OF ABORTED OR SELF INTERRUPTED ATTEMPTS LIFETIME: NO
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
## 2023-01-24 ENCOUNTER — VIRTUAL VISIT (OUTPATIENT)
Dept: BEHAVIORAL HEALTH | Facility: CLINIC | Age: 53
End: 2023-01-24
Payer: COMMERCIAL

## 2023-01-24 DIAGNOSIS — R69 DIAGNOSIS DEFERRED: Primary | ICD-10-CM

## 2023-01-24 NOTE — LETTER
1/24/2023    [facility]  Behavioral Health Home  [address] [place] [address]      Dear Scout:    It was nice talking with you today to discuss Behavioral Health Home services.   I am the Behavioral Health Home Social Work Care Coordinator that works closely with your Primary Care Clinic to support your healthy living goals.  You are eligible for Behavioral Health Home Services that is a paid service through your insurance and is free of cost to you. Behavioral Health Home ([facility]) is a program created to help integrate your primary care clinic and provide services in addition to caring for your physical health. The following are examples of topics that we can assist with if you are to enroll in Behavioral Health Home ([facility]):    - Housing  - Transportation  - Financial Resources  - Coordination with the [facility] for Benefits (MA, SNAP benefits, etc)  - Disability Eligibility and Benefits  - Medical Appointments and Medication Costs  - Employment and Education  - Disability Related Information and Education  - Referrals to mental health services, chemical dependency assessment/treatment, etc     I have enclosed the [facility] Handout that offers basic information of what our program entails. If you are interested in learning more about and/or enrolling in Behavioral Health Home services, you may ask your PCP to connect you with me or feel free to contact me to schedule an appointment. My contact information is listed below.    I look forward to hearing from you next week so we can get another phone appointment scheduled to discuss [facility] services further.         Take nevaeh,    Sarah Qiu, [facility]  Behavioral Health [place] ([facility])   [phone]
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
## 2023-01-24 NOTE — Clinical Note
Hello, The phone visit went well. Scout did not want to enroll today. We plan to have another visit next week to discuss further.  This visit will be scheduled once he has his work schedule for next week.  Thanks for the referral, Sarah

## 2023-02-03 ENCOUNTER — TELEPHONE (OUTPATIENT)
Dept: BEHAVIORAL HEALTH | Facility: CLINIC | Age: 53
End: 2023-02-03
Payer: COMMERCIAL

## 2023-02-03 NOTE — TELEPHONE ENCOUNTER
Patient returned call to [facility].  Spoke to patient who was on a short break from work.  Scheduled a phone appointment for 2/7 at 9:00am to further discuss enrollment in [facility] services.    Sarah Qiu [facility]  [facility]al [facility] ([facility])   [facility]  [phone]

## 2023-02-03 NOTE — TELEPHONE ENCOUNTER
[facility] received voicemail from patient.  Patient states that he toured the apartment last Saturday and he has been approved for the apartment. Patient states that he is interested in [facility] and he would like to discuss further with [facility] to determine if he would like to enroll. Patient provided details on his upcoming work schedule for the next week and requests return call from [facility].    [facility] called patient back and had to leave a message.  [facility] offered a phone appointment for next Tuesday, 2/7 as patient had mentioned that this is his day off of work next week.  [facility] offered appointment times in the morning or afternoon on that date.  [facility] awaiting response from patient.    Sarah Qiu, LICSW [facility] [place] ([facility])   [facility]  [phone]

## 2023-02-07 ENCOUNTER — VIRTUAL VISIT (OUTPATIENT)
Dept: BEHAVIORAL HEALTH | Facility: CLINIC | Age: 53
End: 2023-02-07
Payer: COMMERCIAL

## 2023-02-07 DIAGNOSIS — R69 DIAGNOSIS DEFERRED: Primary | ICD-10-CM
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
## 2023-04-16 ENCOUNTER — HOSPITAL ENCOUNTER (EMERGENCY)
Facility: CLINIC | Age: 53
Discharge: HOME | End: 2023-04-16
Attending: EMERGENCY MEDICINE | Admitting: EMERGENCY MEDICINE
Payer: OTHER MISCELLANEOUS

## 2023-04-16 VITALS
RESPIRATION RATE: 18 BRPM | HEART RATE: 88 BPM | TEMPERATURE: 98.2 F | WEIGHT: 248.24 LBS | DIASTOLIC BLOOD PRESSURE: 93 MMHG | SYSTOLIC BLOOD PRESSURE: 167 MMHG | BODY MASS INDEX: 31.87 KG/M2 | OXYGEN SATURATION: 99 %

## 2023-04-16 DIAGNOSIS — T25.021A BURN OF RIGHT FOOT, UNSPECIFIED BURN DEGREE, INITIAL ENCOUNTER: ICD-10-CM

## 2023-04-16 DIAGNOSIS — R73.9 HYPERGLYCEMIA: ICD-10-CM

## 2023-04-16 DIAGNOSIS — R03.0 ELEVATED BLOOD PRESSURE READING: ICD-10-CM

## 2023-04-16 LAB
ANION GAP SERPL CALCULATED.3IONS-SCNC: 15 MMOL/L (ref 7–15)
BASOPHILS # BLD AUTO: 0.1 10E3/UL (ref 0–0.2)
BASOPHILS NFR BLD AUTO: 1 %
BUN SERPL-MCNC: 14.6 MG/DL (ref 6–20)
CALCIUM SERPL-MCNC: 9.8 MG/DL (ref 8.6–10)
CHLORIDE SERPL-SCNC: 95 MMOL/L (ref 98–107)
CREAT SERPL-MCNC: 0.56 MG/DL (ref 0.67–1.17)
DEPRECATED HCO3 PLAS-SCNC: 22 MMOL/L (ref 22–29)
EOSINOPHIL # BLD AUTO: 0.1 10E3/UL (ref 0–0.7)
EOSINOPHIL NFR BLD AUTO: 1 %
ERYTHROCYTE [DISTWIDTH] IN BLOOD BY AUTOMATED COUNT: 12 % (ref 10–15)
GFR SERPL CREATININE-BSD FRML MDRD: >90 ML/MIN/1.73M2
GLUCOSE SERPL-MCNC: 336 MG/DL (ref 70–99)
HCT VFR BLD AUTO: 49 % (ref 40–53)
HGB BLD-MCNC: 17.9 G/DL (ref 13.3–17.7)
IMM GRANULOCYTES # BLD: 0.1 10E3/UL
IMM GRANULOCYTES NFR BLD: 1 %
LYMPHOCYTES # BLD AUTO: 2.5 10E3/UL (ref 0.8–5.3)
LYMPHOCYTES NFR BLD AUTO: 22 %
MCH RBC QN AUTO: 29.5 PG (ref 26.5–33)
MCHC RBC AUTO-ENTMCNC: 36.5 G/DL (ref 31.5–36.5)
MCV RBC AUTO: 81 FL (ref 78–100)
MONOCYTES # BLD AUTO: 0.6 10E3/UL (ref 0–1.3)
MONOCYTES NFR BLD AUTO: 5 %
NEUTROPHILS # BLD AUTO: 8 10E3/UL (ref 1.6–8.3)
NEUTROPHILS NFR BLD AUTO: 70 %
NRBC # BLD AUTO: 0 10E3/UL
NRBC BLD AUTO-RTO: 0 /100
PLATELET # BLD AUTO: 231 10E3/UL (ref 150–450)
POTASSIUM SERPL-SCNC: 4.4 MMOL/L (ref 3.4–5.3)
RBC # BLD AUTO: 6.07 10E6/UL (ref 4.4–5.9)
SODIUM SERPL-SCNC: 132 MMOL/L (ref 136–145)
TROPONIN T SERPL HS-MCNC: <6 NG/L
WBC # BLD AUTO: 11.3 10E3/UL (ref 4–11)

## 2023-04-16 PROCEDURE — 93005 ELECTROCARDIOGRAM TRACING: CPT | Mod: XU

## 2023-04-16 PROCEDURE — 36415 COLL VENOUS BLD VENIPUNCTURE: CPT | Performed by: EMERGENCY MEDICINE

## 2023-04-16 PROCEDURE — 85025 COMPLETE CBC W/AUTO DIFF WBC: CPT | Performed by: EMERGENCY MEDICINE

## 2023-04-16 PROCEDURE — 84484 ASSAY OF TROPONIN QUANT: CPT | Performed by: EMERGENCY MEDICINE

## 2023-04-16 PROCEDURE — 99284 EMERGENCY DEPT VISIT MOD MDM: CPT

## 2023-04-16 PROCEDURE — 16020 DRESS/DEBRID P-THICK BURN S: CPT

## 2023-04-16 PROCEDURE — 250N000013 HC RX MED GY IP 250 OP 250 PS 637: Performed by: EMERGENCY MEDICINE

## 2023-04-16 PROCEDURE — 80048 BASIC METABOLIC PNL TOTAL CA: CPT | Performed by: EMERGENCY MEDICINE

## 2023-04-16 RX ORDER — ACETAMINOPHEN 500 MG
1000 TABLET ORAL ONCE
Status: COMPLETED | OUTPATIENT
Start: 2023-04-16 | End: 2023-04-16

## 2023-04-16 RX ADMIN — ACETAMINOPHEN 1000 MG: 500 TABLET, FILM COATED ORAL at 17:14

## 2023-04-16 ASSESSMENT — ACTIVITIES OF DAILY LIVING (ADL): ADLS_ACUITY_SCORE: 35

## 2023-04-16 NOTE — Clinical Note
Scout Perez was seen and treated in our emergency department on 4/16/2023.  He may return to work on 04/17/2023.  You may return to work without delay, but should be assigned to duties that do not require standing for more than 15 minutes at a time, until you are feeling better or cleared by another medical provider.      Employer - please assign Scout to alternate duties as able.     If you have any questions or concerns, please don't hesitate to call.      Nima Hernandez MD
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
## 2023-04-17 LAB
ATRIAL RATE - MUSE: 91 BPM
DIASTOLIC BLOOD PRESSURE - MUSE: NORMAL MMHG
INTERPRETATION ECG - MUSE: NORMAL
P AXIS - MUSE: 75 DEGREES
PR INTERVAL - MUSE: 178 MS
QRS DURATION - MUSE: 90 MS
QT - MUSE: 382 MS
QTC - MUSE: 469 MS
R AXIS - MUSE: 28 DEGREES
SYSTOLIC BLOOD PRESSURE - MUSE: NORMAL MMHG
T AXIS - MUSE: 43 DEGREES
VENTRICULAR RATE- MUSE: 91 BPM

## 2023-04-20 ENCOUNTER — OFFICE VISIT (OUTPATIENT)
Dept: PODIATRY | Facility: CLINIC | Age: 53
End: 2023-04-20
Payer: OTHER MISCELLANEOUS

## 2023-04-20 VITALS — BODY MASS INDEX: 31.83 KG/M2 | HEIGHT: 74 IN | WEIGHT: 248 LBS

## 2023-04-20 DIAGNOSIS — T25.222A BURN, FOOT, SECOND DEGREE, LEFT, INITIAL ENCOUNTER: Primary | ICD-10-CM

## 2023-04-20 PROCEDURE — 99204 OFFICE O/P NEW MOD 45 MIN: CPT | Mod: 25 | Performed by: PODIATRIST

## 2023-04-20 PROCEDURE — 97597 DBRDMT OPN WND 1ST 20 CM/<: CPT | Performed by: PODIATRIST

## 2023-04-20 RX ORDER — SILVER SULFADIAZINE 10 MG/G
CREAM TOPICAL
Qty: 50 G | Refills: 0 | Status: SHIPPED | OUTPATIENT
Start: 2023-04-20 | End: 2023-07-27
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
## 2023-04-20 NOTE — Clinical Note
"    4/20/2023         RE: Scout Perez  [address] [facility] [address]        Dear Colleague,    Thank you for referring your patient, Scout Perez, to the [facility] PODIATRY. Please see a copy of my visit note below.    Foot & Ankle Surgery  April 20, 2023    CC: \"***\"    I was asked to see Scout Perez regarding the chief complaint by:  ***    HPI:  Pt is a 53 year old male who presents with above complaint.  ***    ROS:   Pos for CC.  The patient denies current nausea, vomiting, chills, fevers, belly pain, calf pain, chest pain or SOB.  Complete remainder of ROS is otherwise neg.    VITALS:  There were no vitals filed for this visit.    PMH:  No past medical history on file.    SXHX:  No past surgical history on file.     MEDS:    No current outpatient medications on file.     No current facility-administered medications for this visit.       ALL:   No Known Allergies    FMH:    Family History   Problem Relation Age of Onset     Hypertension Mother      Heart Disease Mother      Mental Illness Father      Kidney Disease Father      Diabetes Paternal Grandfather         Type 1     Diabetes Brother      Alcoholism Brother      Diabetes Niece         Type 1     Coronary Artery Disease Early Onset Cousin        SocHx:    Social History     Socioeconomic History     Marital status: Single     Spouse name: Not on file     Number of children: Not on file     Years of education: Not on file     Highest education level: Not on file   Occupational History     Not on file   Tobacco Use     Smoking status: Never     Smokeless tobacco: Never   Vaping Use     Vaping status: Never Used   Substance and Sexual Activity     Alcohol use: Not Currently     Drug use: Never     Sexual activity: Not Currently   Other Topics Concern     Not on file   Social History Narrative     Not on file     Social Determinants of Health     Financial Resource Strain: Not on file   Food Insecurity: Not on file "   Transportation Needs: Not on file   Physical Activity: Not on file   Stress: Not on file   Social Connections: Not on file   Intimate Partner Violence: Not on file   Housing Stability: Not on file           EXAMINATION:  Gen:   No apparent distress  Neuro:   A&Ox3, no deficits  Psych:    Answering questions appropriately for age and situation with normal affect  Head:    NCAT  Eye:    Visual scanning without deficit  Ear:    Response to auditory stimuli wnl  Lung:    Non-labored breathing on RA noted  Abd:    NTND per patient report  Lymph:    Neg for pitting/non-pitting edema BLE  Vasc:    Pulses palpable, CFT minimally delayed  Neuro:    Light touch sensation intact to all sensory nerve distributions without paresthesias  Derm:    Neg for nodules, lesions or ulcerations  MSK:    ROM, strength wnl without limitation, no pain on palpation noted.  Calf:    Neg for redness, swelling or tenderness  ***    Imaging:  ***  Labs:  ***  Cultures:  ***    Assessment:  53 year old male with ***      Medical Decision Making/Plan:  Discussed etiologies, anatomy and options  1.  ***  -I personally reviewed and interpreted the patient's lower extremity history pertinent to today's visit, including imaging/labs, in preparation for initiating a treatment program.  -***    Follow up:  *** or sooner with acute issues      Patient's medical history was reviewed today      Deacon Tavares DPM FACFAS FACFAOM  Podiatric Foot & Ankle Surgeon  [facility]  [phone]    Disclaimer: This note consists of symbols derived from keyboarding, dictation and/or voice recognition software. As a result, there may be errors in the script that have gone undetected. Please consider this when interpreting information found in this chart.            Again, thank you for allowing me to participate in the care of your patient.        Sincerely,        Deacon Tavares DPM, ANTONIA
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
## 2023-05-02 ENCOUNTER — TELEPHONE (OUTPATIENT)
Dept: PODIATRY | Facility: CLINIC | Age: 53
End: 2023-05-02
Payer: COMMERCIAL

## 2023-05-02 ENCOUNTER — TELEPHONE (OUTPATIENT)
Dept: BEHAVIORAL HEALTH | Facility: CLINIC | Age: 53
End: 2023-05-02
Payer: COMMERCIAL

## 2023-05-02 ENCOUNTER — TELEPHONE (OUTPATIENT)
Dept: INTERNAL MEDICINE | Facility: CLINIC | Age: 53
End: 2023-05-02
Payer: COMMERCIAL

## 2023-05-02 NOTE — TELEPHONE ENCOUNTER
Patient calling  He is wanting to est care with Serena. He doesn't want advise from Dania Adams. He is having headaches, not eating well due to headache and burn on his foot. He is not sure if this is happening due to the burn on his foot. Patient advised to make an appt with Dr Lyons.   Ok to call and  [phone]
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
## 2023-05-02 NOTE — TELEPHONE ENCOUNTER
Called; Spoke with patient regarding note below. See telephone encounter at 1:08 with RN regarding foot. Pt states he felt very sick on Sunday after [employer] as he was helping his parents most of the day- lifting, walking, standing. Started to feel very sick Sunday evening. Symptoms included fever, chills. Thinks this could be related to his foot or that he overworked himself earlier that day.     Pt state he didn't have an appetite until about 8:30pm last night. Pt states he is starting to feel better and denies and fever, chills, nausea. Patient is wondering if the cream that was prescribed could be causing his symptoms of feeling sick. I informed patient I am not familiar with this medication, but don't think it could have an effect on why he felt sick on Sunday. Informed patient to schedule an earlier follow up appointment with Dr. Tavares to discuss his foot and come up with long term plan of care and to answer any questions Emiliano may have.     Advised Emiliano if he begins to have fever, chills, or nausea that he should go to the ER. Until his appointment with Dr. EMERY, monitor symptoms and take it easy. Pt is in understanding with plan.     Kim, ATC

## 2023-05-02 NOTE — TELEPHONE ENCOUNTER
[facility] received voicemail from patient stating that he injured his foot at work.  Patient states he is  hoping to talk to [facility] for support.  [facility] called patient back and spoke to patient.    Patient reports that he burned his foot at work a few weeks ago. Patient reports he was seen at the ED and had a visit with a podiatrist.  Patient states that he has a work comp claim due to the injury.  Patient states that he has another visit scheduled with the podiatrist for next week.  [facility] reviewed Epic and confirmed with patient the date and time of his upcoming podiatry appointment. Patient discussed that he has not been feeling well the last several days.  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  Patient states he is more tired and not himself.  Patient discussed that he is trying to stay off of his foot as much as possible   SWCC recommended that patient speak to the podiatry provider's office to discuss his symptoms and concerns.  [facility] offered to complete conference call with patient and podiatry. Patient declines stating that he will call them later today.      [facility] discussed with patient that in order to enroll in [facility] services patient would need to be on a medical assistance plan. CC reviewed MNITS and determined that patient does not have medical assistance. Patient's insurance is [employer] [facility].  Patient expressed understanding.      [facility] discussed with patient the option to have support from a  primary care clinic social work care coordinator.  Patient states he may  be interested in that in the future.  [facility] encouraged patient to schedule an appointment with a primary care provider for care for his chronic conditions.  [facility] offered to complete conference call with patient to schedule an appointment.  Patient declined this and states he will likely call later today. [facility] provided phone number to patient.  Patient discussed his  frustrations and challenges in finding the right primary care provider due to his learning disability and need for provider to understand his limitations.  [facility] will update clinic SWCC that patient may benefit from care coordination for support.      Sarah Qiu, [facility]  [facility] ([facility])   [facility]  [phone]

## 2023-05-02 NOTE — TELEPHONE ENCOUNTER
Called patient to discuss symptoms. He states that he burned his right foot at work on 4/12. EMS was called, but he chose to stay at work the remainder of the day. That evening he presented to the ER for worsening paind and diagnosed with 2nd degree burn. He was referred to Podiatry and had follow-up appointment on 4/20/23. He states that since the burn he does not feel well if he is on his feet or active. States he is getting achy and having headaches and leg pain. Patient states that on Sunday (4/30) afternoon these symptoms increased and was having bad headaches, muscle tightness, not sleeping well and having chest pain that was waking him up hourly during the night. He did not have an appetite on Sunday afternoon into Monday, but then suddenly his appetite returned last night about 8:30 pm and ate dinner. Patient states he was tired after he ate and went to sleep. Patient states he feels fine today, no further symptoms. Today he feels fine, no symptoms and appetite has returned to normal.     He is not sure if all this could be related to the burn or not. Patient states the Podiatrist didn't given him any clear directions on activity other than to stay off work for 3 weeks. He is also concerned that his burn is not healing correctly. Patient denies any yellow or green drainage from his foot when he changes the bandage each day. He states their is white substance covering the burns, but the burns are still very red. Patient not aware of any fevers and he has not noticed any redness spreading past the location of the burns. He did leave a message for Podiatry this morning as well and waiting to hear back from them.     Informed patient that he should speak to Podiatry clinic nurse when they call back regarding activity and whether his symptoms are related to too much activity following the burn.     Patient has history of diabetes, but not taking any medications or checking blood sugars. He states he was told to  get rid of his glucometer so has not checked glucoses for several years. Patient states his BP was elevated and in the 300s when EMS evaluated him, but they said this could be from the burn. Asked patient if he was referring to glucose reading as a BP reading of 300 would be very unusual - however, noted that his glucose in ER on 4/12 was in the 300s. Patient repeats that it was his BP that was in the 300s when EMS checked. Advised patient to schedule an appointment with primary care to address his health and work on controlling his diabetes. Patient agrees and has future appointment scheduled with Dr. Lyons.     Darlin Khanna RN  [facility]

## 2023-05-04 ENCOUNTER — OFFICE VISIT (OUTPATIENT)
Dept: PODIATRY | Facility: CLINIC | Age: 53
End: 2023-05-04
Payer: OTHER MISCELLANEOUS

## 2023-05-04 VITALS
SYSTOLIC BLOOD PRESSURE: 159 MMHG | HEART RATE: 95 BPM | BODY MASS INDEX: 31.83 KG/M2 | WEIGHT: 248 LBS | HEIGHT: 74 IN | DIASTOLIC BLOOD PRESSURE: 98 MMHG

## 2023-05-04 DIAGNOSIS — Z02.6 ENCOUNTER RELATED TO WORKER'S COMPENSATION CLAIM: ICD-10-CM

## 2023-05-04 DIAGNOSIS — Z02.6 WORKER'S COMPENSATION CLAIM ADMINISTRATIVE PROBLEM: ICD-10-CM

## 2023-05-04 DIAGNOSIS — T25.222A BURN, FOOT, SECOND DEGREE, LEFT, INITIAL ENCOUNTER: Primary | ICD-10-CM

## 2023-05-04 PROCEDURE — 97597 DBRDMT OPN WND 1ST 20 CM/<: CPT | Performed by: PODIATRIST

## 2023-05-04 ASSESSMENT — PAIN SCALES - GENERAL: PAINLEVEL: MODERATE PAIN (5)

## 2023-05-04 NOTE — NURSING NOTE
"Chief Complaint   Patient presents with     RECHECK     Left foot- having concerns was told to follow-up in clinic       Initial BP (!) 159/98   Pulse 95   Ht 1.88 m (6' 2\")   Wt 112.5 kg (248 lb)   BMI 31.84 kg/m   Estimated body mass index is 31.84 kg/m  as calculated from the following:    Height as of this encounter: 1.88 m (6' 2\").    Weight as of this encounter: 112.5 kg (248 lb).  Medications and allergies reviewed.      Liana MENA MA    "

## 2023-05-04 NOTE — Clinical Note
"    5/4/2023         RE: Scout Perez  [address] [place] [address]        Dear Colleague,    Thank you for referring your patient, Scout Perez, to the [facility] PODIATRY. Please see a copy of my visit note below.    Foot & Ankle Surgery   May 4, 2023    S:  Pt is seen today for evaluation of ***.    Vitals:    05/04/23 1336   BP: (!) 159/98   Pulse: 95   Weight: 112.5 kg (248 lb)   Height: 1.88 m (6' 2\")   '      ROS - Pos for CC.  Patient denies current nausea, vomiting, chills, fevers, belly pain, calf pain, chest pain or SOB.  Complete remainder of ROS it otherwise neg.  ***    PE:  Gen:   No apparent distress  Eye:    Visual scanning without deficit  Ear:    Response to auditory stimuli wnl  Lung:    Non-labored breathing on RA noted  Abd:    NTND per patient report  Lymph:    Neg for pitting/non-pitting edema BLE  Vasc:    Pulses palpable, CFT minimally delayed  Neuro:    Light touch sensation intact to all sensory nerve distributions without paresthesias  Derm:    Neg for nodules, lesions or ulcerations  MSK:    ROM, strength wnl without limitation, no pain on palpation noted.  Calf:    Neg for redness, swelling or tenderness  ***    Imaging:  ***    Labs:  ***    Cultures:  ***      Assessment:  53 year old male with ***      Medical Decision Making/Plan:  Discussed etiologies, anatomy and options  1.  ***  -***    Follow up:  *** or sooner with acute issues           Deacon Tavares DPM FACFAS FACFAOM  Podiatric Foot & Ankle Surgeon  [facility]  [phone]    Disclaimer: This note consists of symbols derived from keyboarding, dictation and/or voice recognition software. As a result, there may be errors in the script that have gone undetected. Please consider this when interpreting information found in this chart.          Again, thank you for allowing me to participate in the care of your patient.        Sincerely,        Deacon Tavares DPM, " ANTONIA

## 2023-05-04 NOTE — LETTER
May 4, 2023      Scout Perez  [address] [place] [address]        To Whom It May Concern:    Scout Perez was seen in our clinic for the work comp injury to his right foot, left thermal injury.  Because of continued pain, the patient is not cleared to return to work.  We will update his status in 3 weeks at his next appointment    Sincerely,        Deacon Tavares DPM FACFAS FACFAOM  Podiatric Foot & Ankle Surgeon  [facility]

## 2023-05-04 NOTE — PROGRESS NOTES
"Foot & Ankle Surgery   May 4, 2023    S:  Pt is seen today for evaluation of right foot work comp injury burn.  The patient was seen 3 weeks ago and given a prescription for Silvadene.  He states that the foot can be very sore, \"throbbing\" after a period of time on his feet, as little as 1 hour.  He would like to return to work but he is not sure he would be able to do his job duties.  He states he feels \"a fever\" in his leg and in his head and has been having difficulty keeping food down lately.  He states he has burning sensation at the front of the lower leg.  He is diabetic and states he has not seen a doctor for his diabetes/medical issues for approximately 7 years since his previous doctor past    Vitals:    05/04/23 1336   BP: (!) 159/98   Pulse: 95   Weight: 112.5 kg (248 lb)   Height: 1.88 m (6' 2\")   '      ROS - Pos for CC.  Patient denies current nausea, vomiting, chills, fevers, belly pain, calf pain, chest pain or SOB.  Complete remainder of ROS it otherwise neg.      PE:  Gen:   No apparent distress  Eye:    Visual scanning without deficit  Ear:    Response to auditory stimuli wnl  Lung:    Non-labored breathing on RA noted  Abd:    NTND per patient report  Lymph:    Neg for pitting/non-pitting edema BLE  Vasc:    Pulses palpable, CFT minimally delayed  Neuro:    Light touch sensation intact to he wound is at the dorsal lateral right forefoot including the fifth toe but he reports burning sensation anterior lower leg.  Derm:     The wound is partial-thickness, mild fibrous tissue at the base the wound.  Measurements today are 18 x 15 mm.  There is no exposed fat, exposed bone or local signs of infection  MSK:    ROM, strength wnl without limitation, no pain on palpation noted.  Calf:    Neg for redness, swelling or tenderness    Assessment:  53 year old male with work comp thermal injury right foot      Medical Decision Making/Plan:  Discussed etiologies, anatomy and options  1.  Work comp thermal " injury right foot  -Excisional debridement was performed, partial-thickness(limited to skin breakdown, no exposed subcutaneous fat), sharply debriding the wound, excising nonviable tissue to the above dimensions with a 15 blade to patient tolerance  -Continue Silvadene and bandage daily  -We discussed the option for return to work.  I offered a few hours a day, a few days a week, increasing on a weekly basis.  The patient indicates he does not think his employer will allow this.  As such, he was given a note to be out of work for 3 additional weeks  -I do not have a specific cause for the burning sensation of the front of the right lower leg.  We discussed the possibility that as he has been on his foot a lot, this is causing pain, resulting in a compensatory gait.  I do not see signs of infection that would contribute to the symptoms  -Regarding his feeling very ill, we discussed the possibility that he has not had his diabetes monitored in approximately 7 years.  If he continues to feel this ill, I advised to go to the ER.    Follow up: 3 weeks or sooner with acute issues           Deacon Tavares DPM [facility] FACFAOM  Podiatric Foot & Ankle Surgeon  [facility]  [phone]    Disclaimer: This note consists of symbols derived from keyboarding, dictation and/or voice recognition software. As a result, there may be errors in the script that have gone undetected. Please consider this when interpreting information found in this chart.

## 2023-05-05 ENCOUNTER — TELEPHONE (OUTPATIENT)
Dept: BEHAVIORAL HEALTH | Facility: CLINIC | Age: 53
End: 2023-05-05
Payer: COMMERCIAL

## 2023-05-05 NOTE — TELEPHONE ENCOUNTER
Patient left message for [facility] regarding concerns about his recent appointment with podiatrist and issues with his work comp and returning to work.    [facility] returned call to patient.  Patient states that he continues to be stressed about dealing with work comp and the delay in returning to work.  Patient reports that he saw the podiatrist this week and the podiatrist wrote a letter for patient to be off of work for the next three weeks.  Patient reports that he had the doctor office fax this letter to his employer.    Patient discussed concerns about having his job when he is able to return to work.  [facility] encouraged patient to contact his employer to discuss options.  [facility] discussed with patient that it is important to have accurate information in order to understand options and reduce his anxiety.  Patient states he will plan to call and talk to his .  Patient discussed that he had an employment counselor in the past through the [employer], but he believes he this program ended for him.  [facility] encouraged patient to call them again to determine if their agency can provide support to patient with his current work issue.  Patient agreed that this could be helpful and he is considering calling the [employer].     [facility] had updated clinic SWCC, Whitney Barrett, on patient's needs and that he will be establishing care with Dr Lyons on 5/26.  [facility] discussed with patient the contact information for clinic [facility]TR[facility]ON , Whitney Barrett.  [facility] explained that Whitney can be a support to patient with resources and connection with medical care.     Patient discussed that he continues to lose feeling in his right leg after he has been sitting.  [facility] offered to connect patient with a clinic nurse to discuss his symptoms, but patient declined this.  [facility] offered to assist patient in contacting primary care scheduling to determine if patient is able to get in any sooner to see Dr. Lyons to establish care and if  not, to get on the wait list to try and get in sooner.  Completed conference call and SWCC and patient spoke to .  There was not an opening any sooner than 5/26 so  placed patient on the wait list. Patient expressed understanding.      Sarah Qiu, [facility]  [facility]al [facility] [place] ([facility])   [facility]  [phone]

## 2023-05-09 ENCOUNTER — TELEPHONE (OUTPATIENT)
Dept: PODIATRY | Facility: CLINIC | Age: 53
End: 2023-05-09
Payer: COMMERCIAL

## 2023-05-09 ENCOUNTER — PATIENT OUTREACH (OUTPATIENT)
Dept: CARE COORDINATION | Facility: CLINIC | Age: 53
End: 2023-05-09
Payer: COMMERCIAL

## 2023-05-09 ASSESSMENT — ACTIVITIES OF DAILY LIVING (ADL): DEPENDENT_IADLS:: INDEPENDENT

## 2023-05-09 NOTE — PROGRESS NOTES
Clinic Care Coordination Contact    [facility] whom shared patient will be [facility] primary care at the [facility] on 5/26/2023. SWCC received call/vm from patient requesting a return call to discuss care coordination services/potential supports. Spoke with patient whom provided brief history on current situation regarding his foot injury and workers compensation (see [facility] notes for further details). Patient shared his foot is slowly healing, however, he is currently unable to wear a shoe, therefore, unsure when he will be able to return to work. Patient shared he has upcoming visits with insurance regarding his potential return to work and denied any needs regarding above. Provided active listening and support throughout call. Allowed time and space for patient to voice any other CC needs. Patient shared he has not seen a primary provider since 2019 due to COVID. Patient voiced he may be interested in resources/support in regards to his diabetes after upcoming PCP appointment, however, at this time would like to focus on healing his foot injury. Reviewed SWCC role with patient and offered to follow up after PCP appointment 5/26/2023. Patient expressed understanding and requested CC call back week of 5/29/2023.     Plan: SWCC will contact patient week of 5/29/2023 to review CC services and potential enrollment into program. Patient was provided with [facility] contact information and encouraged to call with questions, concerns, and/or support needs prior to next outreach.     FARHAN Hernandez[facility]  Social Work Care Coordinator  [facility] [place], [facility], and [place]  Phone: [phone]

## 2023-05-09 NOTE — TELEPHONE ENCOUNTER
Reason for Call:  Form, our goal is to have forms completed with 7 days, however, some forms may require a visit or additional information.    Type of letter, form or note:  work comp     Who is the form from?:M    Where did the form come from: form was faxed in    Phone number of person requesting form: [phone]    Can we leave a detailed message on this number:  YES    Desired completion date of form: 7-10 days      How will form be returned?:  fax to [phone]    Has the patient signed a consent form for release of information (may be included with form)? YES    Additional comments:     Form was started and place in Provider Basket for provider review/ completion at Dr. Tavares.

## 2023-05-11 NOTE — TELEPHONE ENCOUNTER
[employer] works forms completed.  No work from 5/4/23 to 5/25/23, at which point patient is scheduled for follow up.  Anticipate return to work starting 5/26/23 for light duties:  1.  Work job duties as able; 2.  Rest, elevate foot every 2 hours x 10 min prn pain.  These restrictions are in place x 3 weeks and will lapse starting 6/17/23 unless otherwise noted.    Will fax forms today.    Deacon Tavares DPM FACNoland Hospital Tuscaloosa FACFAOM  Podiatric Foot & Ankle Surgeon  [facility]  [phone]

## 2023-05-25 ENCOUNTER — OFFICE VISIT (OUTPATIENT)
Dept: PODIATRY | Facility: CLINIC | Age: 53
End: 2023-05-25
Payer: OTHER MISCELLANEOUS

## 2023-05-25 VITALS
BODY MASS INDEX: 32.84 KG/M2 | HEIGHT: 74 IN | WEIGHT: 255.9 LBS | DIASTOLIC BLOOD PRESSURE: 100 MMHG | SYSTOLIC BLOOD PRESSURE: 160 MMHG

## 2023-05-25 DIAGNOSIS — Z02.6 WORKER'S COMPENSATION CLAIM ADMINISTRATIVE PROBLEM: ICD-10-CM

## 2023-05-25 DIAGNOSIS — T25.222A BURN, FOOT, SECOND DEGREE, LEFT, INITIAL ENCOUNTER: Primary | ICD-10-CM

## 2023-05-25 PROCEDURE — 99213 OFFICE O/P EST LOW 20 MIN: CPT | Performed by: PODIATRIST

## 2023-05-25 RX ORDER — CEPHALEXIN 500 MG/1
500 CAPSULE ORAL 2 TIMES DAILY
Qty: 20 CAPSULE | Refills: 0 | Status: SHIPPED | OUTPATIENT
Start: 2023-05-25 | End: 2023-05-25

## 2023-05-25 NOTE — PATIENT INSTRUCTIONS
[facility]!    DR. EMERY'S CLINIC LOCATIONS:     [facility] (Friday) TRIAGE LINE: [phone] [address] [facility]  APPOINTMENTS: [phone]   [place] [address] RADIOLOGY: [phone]    PHYSICAL THERAPY: [phone]    SET UP SURGERY: [phone]   [place] (Mon-Tues AM-Thurs) BILLING QUESTIONS: [phone]   [address] FAX: [phone]   [place] [address]       Follow up: 3 weeks

## 2023-05-25 NOTE — LETTER
"    5/25/2023         RE: Scout Perez  [address] [place] [facility] [address]        Dear Colleague,    Thank you for referring your patient, Scout Perez, to the [facility] PODIATRY. Please see a copy of my visit note below.    Foot & Ankle Surgery   May 25, 2023    S:  Pt is seen today for evaluation of the work comp burn of his right foot.  This has been improving well with the Silvadene and wound care.  The patient has been off of work since he was last seen 3 weeks ago.  He recently had an episode where both feet were swollen, he had difficulty moving and could not get out of bed.  He also notes episodes of numbness in the right lower extremity up to the knee.  He notes none of this ever happened prior to the injury to his foot.  He relates he has been helping to take care of his parents including cutting the grass and running errands for them.  He has an appointment with Dr. Lyons tomorrow for management of medical issues and to establish care.    Vitals:    05/25/23 1334   BP: (!) 160/100   Weight: 116.1 kg (255 lb 14.4 oz)   Height: 1.88 m (6' 2\")   '      ROS - Pos for CC.  Patient denies current nausea, vomiting, chills, fevers, belly pain, calf pain, chest pain or SOB.  Complete remainder of ROS it otherwise neg.      PE:  Gen:   No apparent distress  Eye:    Visual scanning without deficit  Ear:    Response to auditory stimuli wnl  Lung:    Non-labored breathing on RA noted  Abd:    NTND per patient report  Lymph:    Neg for pitting/non-pitting edema BLE  Vasc:    Pulses palpable, CFT minimally delayed  Neuro:    Light touch sensation intact to all sensory nerve distributions without paresthesias  Derm:    The burn at the dorsal base of the right fifth toe is much improved.  There are only a few small open areas.  There is minimal inflammation and no acute signs of infection present.  MSK:    Passive range of motion of the right fifth toe shows no MPJ pain  Calf:    Neg " for redness, swelling or tenderness      Assessment:  53 year old male with work comp burn right foot, nearly fully epithelialized without signs of infection      Medical Decision Making/Plan:  Discussed etiologies, anatomy and options  1.  Work comp injury including a burn to the right foot, nearly fully epithelialized without signs of infection  -No debridement was needed today  -The patient has significant concerns about his constitutional signs and is worried about severe infection in the foot.  We did discuss a course of antibiotics but he eventually declined.  -At our first visit on 4/20/2023, I advised he talk with his primary doctor about his tetanus status as this does not appear to have been addressed in the ER.    -Continue wound care: Wash thoroughly, dry thoroughly, Silvadene ointment and a bandage.  The patient states that he is likely just can leave this open to air.  I advised against this, as a desiccated wound did not heal well and this may increase the likelihood of contamination/infection.  -He indicated he has concerns about returning to work with feeling ill has he has recently, and he would like to see Dr. Lyons and have his medical history addressed prior to return to work.  We discussed that I am seeing him for the burn, and he is on current restrictions for the burn.  As such, we cannot extend his time off work until his medical issues are resolved.  He was given a note to return to work on 5/28/2023.  If Dr. Lyons sees fit to keep him off of work for an extended period of time, this will be his decision    Follow up:  3 weeks or sooner with acute issues           Deacon Tavares DPM [facility]AC[facility] FACFAOM  Podiatric Foot & Ankle Surgeon  [facility]  [phone]    Disclaimer: This note consists of symbols derived from keyboarding, dictation and/or voice recognition software. As a result, there may be errors in the script that have gone undetected. Please consider this  when interpreting information found in this chart.          Again, thank you for allowing me to participate in the care of your patient.        Sincerely,        Deacon Tavares DPM, ANTONIA

## 2023-05-25 NOTE — PROGRESS NOTES
"Foot & Ankle Surgery   May 25, 2023    S:  Pt is seen today for evaluation of the work comp burn of his right foot.  This has been improving well with the Silvadene and wound care.  The patient has been off of work since he was last seen 3 weeks ago.  He recently had an episode where both feet were swollen, he had difficulty moving and could not get out of bed.  He also notes episodes of numbness in the right lower extremity up to the knee.  He notes none of this ever happened prior to the injury to his foot.  He relates he has been helping to take care of his parents including cutting the grass and running errands for them.  He has an appointment with Dr. Lyons tomorrow for management of medical issues and to establish care.    Vitals:    05/25/23 1334   BP: (!) 160/100   Weight: 116.1 kg (255 lb 14.4 oz)   Height: 1.88 m (6' 2\")   '      ROS - Pos for CC.  Patient denies current nausea, vomiting, chills, fevers, belly pain, calf pain, chest pain or SOB.  Complete remainder of ROS it otherwise neg.      PE:  Gen:   No apparent distress  Eye:    Visual scanning without deficit  Ear:    Response to auditory stimuli wnl  Lung:    Non-labored breathing on RA noted  Abd:    NTND per patient report  Lymph:    Neg for pitting/non-pitting edema BLE  Vasc:    Pulses palpable, CFT minimally delayed  Neuro:    Light touch sensation intact to all sensory nerve distributions without paresthesias  Derm:    The burn at the dorsal base of the right fifth toe is much improved.  There are only a few small open areas.  There is minimal inflammation and no acute signs of infection present.  MSK:    Passive range of motion of the right fifth toe shows no MPJ pain  Calf:    Neg for redness, swelling or tenderness      Assessment:  53 year old male with work comp burn right foot, nearly fully epithelialized without signs of infection      Medical Decision Making/Plan:  Discussed etiologies, anatomy and options  1.  Work comp injury " including a burn to the right foot, nearly fully epithelialized without signs of infection  -No debridement was needed today  -The patient has significant concerns about his constitutional signs and is worried about severe infection in the foot.  We did discuss a course of antibiotics but he eventually declined.  -At our first visit on 4/20/2023, I advised he talk with his primary doctor about his tetanus status as this does not appear to have been addressed in the ER.    -Continue wound care: Wash thoroughly, dry thoroughly, Silvadene ointment and a bandage.  The patient states that he is likely just can leave this open to air.  I advised against this, as a desiccated wound did not heal well and this may increase the likelihood of contamination/infection.  -He indicated he has concerns about returning to work with feeling ill has he has recently, and he would like to see Dr. Lyons and have his medical history addressed prior to return to work.  We discussed that I am seeing him for the burn, and he is on current restrictions for the burn.  As such, we cannot extend his time off work until his medical issues are resolved.  He was given a note to return to work on 5/28/2023.  If Dr. Lyons sees fit to keep him off of work for an extended period of time, this will be his decision    Follow up:  3 weeks or sooner with acute issues           Deacon Tavares DPM FACFAS FACFAOM  Podiatric Foot & Ankle Surgeon  [facility]  [phone]    Disclaimer: This note consists of symbols derived from keyboarding, dictation and/or voice recognition software. As a result, there may be errors in the script that have gone undetected. Please consider this when interpreting information found in this chart.

## 2023-05-25 NOTE — LETTER
May 25, 2023      Scout Perez  [address] [facility] [address]        To Whom It May Concern:    Scout Perez was seen in our clinic for the work comp injury of the right foot.  He is cleared to return to work effective 5/28/2023.  I will be seeing him back in clinic in 3 weeks for reassessment.      Sincerely,        Deacon Tavares DPM FACFAS FACFAOM  Podiatric Foot & Ankle Surgeon  [facility]

## 2023-05-26 ENCOUNTER — OFFICE VISIT (OUTPATIENT)
Dept: INTERNAL MEDICINE | Facility: CLINIC | Age: 53
End: 2023-05-26
Payer: COMMERCIAL

## 2023-05-26 VITALS
WEIGHT: 255.5 LBS | BODY MASS INDEX: 31.77 KG/M2 | RESPIRATION RATE: 20 BRPM | HEART RATE: 85 BPM | OXYGEN SATURATION: 97 % | TEMPERATURE: 98 F | SYSTOLIC BLOOD PRESSURE: 152 MMHG | DIASTOLIC BLOOD PRESSURE: 94 MMHG | HEIGHT: 75 IN

## 2023-05-26 DIAGNOSIS — Z11.59 NEED FOR HEPATITIS C SCREENING TEST: ICD-10-CM

## 2023-05-26 DIAGNOSIS — Z11.4 SCREENING FOR HIV (HUMAN IMMUNODEFICIENCY VIRUS): ICD-10-CM

## 2023-05-26 DIAGNOSIS — E11.9 TYPE 2 DIABETES MELLITUS WITHOUT COMPLICATION, WITHOUT LONG-TERM CURRENT USE OF INSULIN (H): Primary | ICD-10-CM

## 2023-05-26 DIAGNOSIS — I10 BENIGN ESSENTIAL HYPERTENSION: ICD-10-CM

## 2023-05-26 LAB
ALBUMIN SERPL BCG-MCNC: 4.9 G/DL (ref 3.5–5.2)
ALP SERPL-CCNC: 85 U/L (ref 40–129)
ALT SERPL W P-5'-P-CCNC: 48 U/L (ref 10–50)
ANION GAP SERPL CALCULATED.3IONS-SCNC: 17 MMOL/L (ref 7–15)
AST SERPL W P-5'-P-CCNC: 33 U/L (ref 10–50)
BASOPHILS # BLD AUTO: 0.1 10E3/UL (ref 0–0.2)
BASOPHILS NFR BLD AUTO: 1 %
BILIRUB SERPL-MCNC: 0.6 MG/DL
BUN SERPL-MCNC: 11.5 MG/DL (ref 6–20)
CALCIUM SERPL-MCNC: 10.1 MG/DL (ref 8.6–10)
CHLORIDE SERPL-SCNC: 99 MMOL/L (ref 98–107)
CHOLEST SERPL-MCNC: 218 MG/DL
CREAT SERPL-MCNC: 0.6 MG/DL (ref 0.67–1.17)
DEPRECATED HCO3 PLAS-SCNC: 19 MMOL/L (ref 22–29)
EOSINOPHIL # BLD AUTO: 0.1 10E3/UL (ref 0–0.7)
EOSINOPHIL NFR BLD AUTO: 1 %
ERYTHROCYTE [DISTWIDTH] IN BLOOD BY AUTOMATED COUNT: 11.7 % (ref 10–15)
GFR SERPL CREATININE-BSD FRML MDRD: >90 ML/MIN/1.73M2
GLUCOSE SERPL-MCNC: 335 MG/DL (ref 70–99)
HBA1C MFR BLD: 10.6 % (ref 0–5.6)
HCT VFR BLD AUTO: 47.3 % (ref 40–53)
HDLC SERPL-MCNC: 59 MG/DL
HGB BLD-MCNC: 17.4 G/DL (ref 13.3–17.7)
IMM GRANULOCYTES # BLD: 0 10E3/UL
IMM GRANULOCYTES NFR BLD: 1 %
LDLC SERPL CALC-MCNC: 89 MG/DL
LYMPHOCYTES # BLD AUTO: 2.3 10E3/UL (ref 0.8–5.3)
LYMPHOCYTES NFR BLD AUTO: 27 %
MCH RBC QN AUTO: 28.7 PG (ref 26.5–33)
MCHC RBC AUTO-ENTMCNC: 36.8 G/DL (ref 31.5–36.5)
MCV RBC AUTO: 78 FL (ref 78–100)
MONOCYTES # BLD AUTO: 0.4 10E3/UL (ref 0–1.3)
MONOCYTES NFR BLD AUTO: 5 %
NEUTROPHILS # BLD AUTO: 5.6 10E3/UL (ref 1.6–8.3)
NEUTROPHILS NFR BLD AUTO: 66 %
NONHDLC SERPL-MCNC: 159 MG/DL
PLATELET # BLD AUTO: 225 10E3/UL (ref 150–450)
POTASSIUM SERPL-SCNC: 4 MMOL/L (ref 3.4–5.3)
PROT SERPL-MCNC: 7.4 G/DL (ref 6.4–8.3)
RBC # BLD AUTO: 6.07 10E6/UL (ref 4.4–5.9)
SODIUM SERPL-SCNC: 135 MMOL/L (ref 136–145)
TRIGL SERPL-MCNC: 348 MG/DL
WBC # BLD AUTO: 8.5 10E3/UL (ref 4–11)

## 2023-05-26 PROCEDURE — 82043 UR ALBUMIN QUANTITATIVE: CPT | Performed by: INTERNAL MEDICINE

## 2023-05-26 PROCEDURE — 86803 HEPATITIS C AB TEST: CPT | Performed by: INTERNAL MEDICINE

## 2023-05-26 PROCEDURE — 36415 COLL VENOUS BLD VENIPUNCTURE: CPT | Performed by: INTERNAL MEDICINE

## 2023-05-26 PROCEDURE — 99207 PR FOOT EXAM NO CHARGE: CPT | Performed by: INTERNAL MEDICINE

## 2023-05-26 PROCEDURE — 83036 HEMOGLOBIN GLYCOSYLATED A1C: CPT | Performed by: INTERNAL MEDICINE

## 2023-05-26 PROCEDURE — 80061 LIPID PANEL: CPT | Performed by: INTERNAL MEDICINE

## 2023-05-26 PROCEDURE — 87389 HIV-1 AG W/HIV-1&-2 AB AG IA: CPT | Performed by: INTERNAL MEDICINE

## 2023-05-26 PROCEDURE — 99214 OFFICE O/P EST MOD 30 MIN: CPT | Performed by: INTERNAL MEDICINE

## 2023-05-26 PROCEDURE — 80053 COMPREHEN METABOLIC PANEL: CPT | Performed by: INTERNAL MEDICINE

## 2023-05-26 PROCEDURE — 85025 COMPLETE CBC W/AUTO DIFF WBC: CPT | Performed by: INTERNAL MEDICINE

## 2023-05-26 PROCEDURE — 82570 ASSAY OF URINE CREATININE: CPT | Performed by: INTERNAL MEDICINE

## 2023-05-26 RX ORDER — GLIPIZIDE 5 MG/1
5 TABLET ORAL
Qty: 60 TABLET | Refills: 2 | Status: SHIPPED | OUTPATIENT
Start: 2023-05-26 | End: 2023-06-22

## 2023-05-26 ASSESSMENT — ENCOUNTER SYMPTOMS
NUMBNESS: 1
GASTROINTESTINAL NEGATIVE: 1
CARDIOVASCULAR NEGATIVE: 1
RESPIRATORY NEGATIVE: 1
CONSTITUTIONAL NEGATIVE: 1
ARTHRALGIAS: 1

## 2023-05-26 ASSESSMENT — PATIENT HEALTH QUESTIONNAIRE - PHQ9
SUM OF ALL RESPONSES TO PHQ QUESTIONS 1-9: 12
SUM OF ALL RESPONSES TO PHQ QUESTIONS 1-9: 12
10. IF YOU CHECKED OFF ANY PROBLEMS, HOW DIFFICULT HAVE THESE PROBLEMS MADE IT FOR YOU TO DO YOUR WORK, TAKE CARE OF THINGS AT HOME, OR GET ALONG WITH OTHER PEOPLE: NOT DIFFICULT AT ALL

## 2023-05-26 ASSESSMENT — PAIN SCALES - GENERAL: PAINLEVEL: SEVERE PAIN (7)

## 2023-05-26 NOTE — PROGRESS NOTES
Assessment & Plan     Type 2 diabetes mellitus without complication, without long-term current use of insulin (H)  At this time, patient has not been taking any medication for management of his blood sugar for quite some time.  His previous A1c approximately 7 months ago was noted to be elevated at 9.7.  Patient was agreeable to have repeat lab work collected at this time.  He does have an A1c, CMP, CBC, lipid panel, and urine microalbumin that are currently pending.  Patient was also agreeable to start management of his blood sugar with glipizide 5 mg by mouth twice per day.  He was quite adamant that he would not take metformin due to his previous issues with tolerance of the medication.  Patient will be contacted with results of his lab results are available for review.  Further recommendations will be made at that time.  - Albumin Random Urine Quantitative with Creat Ratio; Future  - Lipid panel reflex to direct LDL Non-fasting; Future  - HEMOGLOBIN A1C; Future  - FOOT EXAM  - Comprehensive metabolic panel (BMP + Alb, Alk Phos, ALT, AST, Total. Bili, TP); Future  - CBC with platelets and differential; Future  - glipiZIDE (GLUCOTROL) 5 MG tablet; Take 1 tablet (5 mg) by mouth 2 times daily (before meals)    Benign essential hypertension  At this time, patient's blood pressure is not currently under good control.  He did seem to have some reluctance in regards to starting medications.  Patient did submit a blood sample today for a metabolic panel, and he did wish to await results of his labs before making any decisions in regards to additional medications.  We did discuss dietary and lifestyle modifications that can improve his blood pressure control.  I do feel that it would be worthwhile for him to be on an ACE inhibitor, but patient was reluctant to discuss further medications at this time.    Screening for HIV (human immunodeficiency virus)  - HIV Antigen Antibody Combo    Need for hepatitis C screening  "test  - Hepatitis C Screen Reflex to HCV RNA Quant and Genotype    Ordering of each unique test  Prescription drug management  30 minutes spent by me on the date of the encounter doing chart review, history and exam, documentation and further activities per the note       BMI:   Estimated body mass index is 32.37 kg/m  as calculated from the following:    Height as of this encounter: 1.892 m (6' 2.5\").    Weight as of this encounter: 115.9 kg (255 lb 8 oz).   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  Patient has a longstanding history of diabetes, but he does state that he has been off of all medications for approximately 7 years.  Patient states that his previous physician did pass away during the COVID pandemic, but he does state that he had been discontinued off of all oral diabetic medications several years ago.  Patient does not provide any details as to the exact reason why all of his medications were discontinued.  He had been seen in September 2022 at which time his hemoglobin A1c was noted to be 9.7.  Patient had been placed on metformin, but he did have some issues tolerating the medication after a few days.  Patient reports that it did cause him severe chest pains.  Patient did stop taking this medication, and his issues did resolve.  He is not " "currently taking any oral medications for management of his diabetes, blood pressure, and cholesterol.  Patient does state several times that he was told previously that he should stay off of medications for the foreseeable future.  He did suffer a burn on his right foot in April 2023.  He has been seeing a podiatrist who has been managing his wound care.  Review of the podiatrist notes does note good healing, but they are concerned that his uncontrolled blood sugar may be preventing continued improvement.  They did recommend that he see a primary care provider to address his chronic diseases.  Patient does report that he is having intermittent episodes of numbness and tingling in all of his extremities.  He does report feeling fatigued and not well at times.  Patient denies any fevers or chills.    History of Present Illness       Reason for visit:  New paitent    He eats 0-1 servings of fruits and vegetables daily.He consumes 3 sweetened beverage(s) daily.He exercises with enough effort to increase his heart rate 9 or less minutes per day.  He exercises with enough effort to increase his heart rate 3 or less days per week.   He is taking medications regularly.    Today's PHQ-9         PHQ-9 Total Score: 12    PHQ-9 Q9 Thoughts of better off dead/self-harm past 2 weeks :   Not at all    How difficult have these problems made it for you to do your work, take care of things at home, or get along with other people: Not difficult at all         Review of Systems   Constitutional: Negative.    HENT: Negative.    Respiratory: Negative.    Cardiovascular: Negative.    Gastrointestinal: Negative.    Genitourinary: Negative.    Musculoskeletal: Positive for arthralgias.   Neurological: Positive for numbness.            Objective    BP (!) 152/94 (BP Location: Right arm, Patient Position: Sitting, Cuff Size: Adult Large)   Pulse 85   Temp 98  F (36.7  C) (Oral)   Resp 20   Ht 1.892 m (6' 2.5\")   Wt 115.9 kg (255 lb 8 " oz)   SpO2 97%   BMI 32.37 kg/m    Body mass index is 32.37 kg/m .  Physical Exam  Vitals reviewed.   Constitutional:       Appearance: Normal appearance.   HENT:      Head: Normocephalic and atraumatic.      Mouth/Throat:      Mouth: Mucous membranes are moist.      Pharynx: Oropharynx is clear.   Eyes:      Extraocular Movements: Extraocular movements intact.      Conjunctiva/sclera: Conjunctivae normal.      Pupils: Pupils are equal, round, and reactive to light.   Cardiovascular:      Rate and Rhythm: Normal rate and regular rhythm.      Pulses: Normal pulses.           Dorsalis pedis pulses are 2+ on the right side and 2+ on the left side.        Posterior tibial pulses are 2+ on the right side and 2+ on the left side.      Heart sounds: Normal heart sounds.   Pulmonary:      Effort: Pulmonary effort is normal.      Breath sounds: Normal breath sounds.   Feet:      Right foot:      Protective Sensation: 4 sites tested. 2 sites sensed.      Skin integrity: Skin breakdown (Healing burn noted on lateral aspect of right foot.) present.      Left foot:      Protective Sensation: 4 sites tested. 2 sites sensed.      Skin integrity: Skin integrity normal.   Skin:     General: Skin is warm and dry.      Capillary Refill: Capillary refill takes less than 2 seconds.   Neurological:      General: No focal deficit present.      Mental Status: He is alert and oriented to person, place, and time.        Diagnostic Test Results: Hemoglobin A1c, FLP, CMP, urine microalbumin, hepatitis C screen, HIV screen, and CBC are pending.

## 2023-05-27 LAB
CREAT UR-MCNC: 25.2 MG/DL
MICROALBUMIN UR-MCNC: 96.3 MG/L
MICROALBUMIN/CREAT UR: 382.14 MG/G CR (ref 0–17)

## 2023-05-28 LAB
HCV AB SERPL QL IA: NONREACTIVE
HIV 1+2 AB+HIV1 P24 AG SERPL QL IA: NONREACTIVE

## 2023-05-31 ENCOUNTER — PATIENT OUTREACH (OUTPATIENT)
Dept: CARE COORDINATION | Facility: CLINIC | Age: 53
End: 2023-05-31
Payer: COMMERCIAL

## 2023-05-31 NOTE — LETTER
[facility]  [address] [place] [address]    May 31, 2023      Scout Perez  [address] [facility] [address]          Dear Scout,    I am a clinic care coordinator who works with the [facility] with the [facility]. I recently tried to call and was unable to reach you. Below is a description of clinic care coordination and how I can further assist you shall any needs arise.       The clinic care coordination team is made up of a registered nurse, , financial resource worker and community health worker who understand the health care system. The goal of clinic care coordination is to help you manage your health and improve access to the health care system. Our team works alongside your provider to assist you in determining your health and social needs. We can help you obtain health care and community resources, providing you with necessary information and education. We can work with you through any barriers and develop a care plan that helps coordinate and strengthen the communication between you and your care team.  Our services are voluntary and are offered without charge to you personally.    Please feel free to contact me with any questions or concerns regarding care coordination and what we can offer.      We are focused on providing you with the highest-quality healthcare experience possible.    Sincerely,     FARHAN Hernandez/[facility]  Social Work Care Coordinator  [facility] [place], [place], [facility], and [place]  Phone: [phone]

## 2023-05-31 NOTE — PROGRESS NOTES
Clinic Care Coordination Contact  [facility]/Voicemail     Clinical Data: Care Coordinator Outreach  Outreach attempted x 1 per patient request noted by CC 5/9/2023. Left message on patient's voicemail with call back information and requested return call.  Plan: Care Coordinator will send care coordination introduction letter with care coordinator contact information and explanation of care coordination services via mail. Care Coordinator will do no further outreaches at this time.    FARHAN Hernandez/[facility]  Social Work Care Coordinator  [facility] [place], [place], [facility], and [place]  Phone: [phone]

## 2023-06-01 NOTE — PROGRESS NOTES
Clinic Care Coordination Contact    [facility] received return call/vm from patient requesting call back on Tuesday 6/6/2023 to review CC program. Patient identified no needs at this time, however, would like to touch base. [facility] will plan on calling patient as requested above to follow-up in regards to coordination of care and potential CC enrollment.     FARHAN Hernandez/[facility]  Social Work Care Coordinator  [facility] - [place], [place], [facility], and [place]  Phone: [phone]

## 2023-06-05 ENCOUNTER — TELEPHONE (OUTPATIENT)
Dept: INTERNAL MEDICINE | Facility: CLINIC | Age: 53
End: 2023-06-05

## 2023-06-05 ENCOUNTER — OFFICE VISIT (OUTPATIENT)
Dept: PODIATRY | Facility: CLINIC | Age: 53
End: 2023-06-05
Payer: OTHER MISCELLANEOUS

## 2023-06-05 VITALS — WEIGHT: 255 LBS | HEIGHT: 75 IN | BODY MASS INDEX: 31.71 KG/M2

## 2023-06-05 DIAGNOSIS — T25.222A BURN, FOOT, SECOND DEGREE, LEFT, INITIAL ENCOUNTER: Primary | ICD-10-CM

## 2023-06-05 DIAGNOSIS — Z02.6 ENCOUNTER RELATED TO WORKER'S COMPENSATION CLAIM: ICD-10-CM

## 2023-06-05 DIAGNOSIS — Z02.6 WORKER'S COMPENSATION CLAIM ADMINISTRATIVE PROBLEM: ICD-10-CM

## 2023-06-05 PROCEDURE — 99213 OFFICE O/P EST LOW 20 MIN: CPT | Performed by: PODIATRIST

## 2023-06-05 NOTE — LETTER
June 5, 2023      Scout Perez  [address] [facility] [address]        To Whom It May Concern:    Scout Perez was seen in our clinic for follow up on the work-comp right foot burn.  Current restrictions:     Rest, ice, elevate the foot every 3 hours x 15 minutes as needed based on pain.    The above restriction is in place x 2 weeks and will lapse unless otherwise noted.      Sincerely,      Deacon Tavares DPM FACFAS FACFAOM  Podiatric Foot & Ankle Surgeon  [facility]

## 2023-06-05 NOTE — Clinical Note
"    6/5/2023         RE: Scout Perez  [address] [facility] [place] [address]        Dear Colleague,    Thank you for referring your patient, Scout Perez, to the [facility]. Please see a copy of my visit note below.    Foot & Ankle Surgery   June 5, 2023    S:  Pt is seen today for evaluation of ***.    Vitals:    06/05/23 1436   Weight: 115.7 kg (255 lb)   Height: 1.892 m (6' 2.5\")   '      ROS - Pos for CC.  Patient denies current nausea, vomiting, chills, fevers, belly pain, calf pain, chest pain or SOB.  Complete remainder of ROS it otherwise neg.  ***    PE:  Gen:   No apparent distress  Eye:    Visual scanning without deficit  Ear:    Response to auditory stimuli wnl  Lung:    Non-labored breathing on RA noted  Abd:    NTND per patient report  Lymph:    Neg for pitting/non-pitting edema BLE  Vasc:    Pulses palpable, CFT minimally delayed  Neuro:    Light touch sensation intact to all sensory nerve distributions without paresthesias  Derm:    Neg for nodules, lesions or ulcerations  MSK:    ROM, strength wnl without limitation, no pain on palpation noted.  Calf:    Neg for redness, swelling or tenderness  ***    Imaging:  ***    Labs:  ***    Cultures:  ***      Assessment:  53 year old male with ***      Medical Decision Making/Plan:  Discussed etiologies, anatomy and options  1.  ***  -***    Follow up:  *** or sooner with acute issues           Deacon Tavares DPM FACFAS FACFAOM  Podiatric Foot & Ankle Surgeon  [facility]  [phone]    Disclaimer: This note consists of symbols derived from keyboarding, dictation and/or voice recognition software. As a result, there may be errors in the script that have gone undetected. Please consider this when interpreting information found in this chart.          Again, thank you for allowing me to participate in the care of your patient.        Sincerely,        Deacon Tavares DPM, ANTONIA  "

## 2023-06-05 NOTE — PROGRESS NOTES
"Foot & Ankle Surgery   June 5, 2023    S:  Pt is seen today for evaluation of work comp burn of the right foot.  He was seen approximately 10 days ago, and was seen shortly thereafter by Dr. Lyons.  He presents today with concerns of the wound.  He states this occasionally with nayely if he is on his feet for extended periods of time.  He also noticed recently 2 episodes where the wound was bleeding.  He noticed that some of the \"dead skin fell off\" in the shower and the bathtub started to fill with blood.  He notes the second episode where, upon removing his shoe at work, it had bled through the Band-Aid and his sock, and he was subsequently sent home..    Vitals:    06/05/23 1436   Weight: 115.7 kg (255 lb)   Height: 1.892 m (6' 2.5\")   '      ROS - Pos for CC.  Patient denies current nausea, vomiting, chills, fevers, belly pain, calf pain, chest pain or SOB.  Complete remainder of ROS it otherwise neg.      PE:  Gen:   No apparent distress  Eye:    Visual scanning without deficit  Ear:    Response to auditory stimuli wnl  Lung:    Non-labored breathing on RA noted  Abd:    NTND per patient report  Lymph:    Neg for pitting/non-pitting edema BLE  Vasc:    Pulses palpable, CFT minimally delayed  Neuro:    Light touch sensation intact to all sensory nerve distributions without paresthesias  Derm:    The wound/burn at the dorsal lateral right fifth MPJ measures 10 x 8 mm today.  The base is nearly 100% granulation tissue.  There is pink immature surrounding tissue/skin but there is no cellulitis, purulence or necrosis.  The area is not warm or hot.  MSK:    ROM, strength wnl without limitation, no pain on palpation noted.  Calf:    Neg for redness, swelling or tenderness    Assessment:  53 year old male with work comp burn with well-healing wound, no signs of infection seen today      Medical Decision Making/Plan:  Discussed etiologies, anatomy and options  1.  Work comp burn with well-healing wound, no signs of " infection seen today  -No indication for debridement as the base appears very healthy  -Continue wound care: Wash thoroughly, dry thoroughly, Silvadene, bandage  -We discussed that with prolonged periods of time on his feet, he may feel some discomfort.  RICE/Tylenol as needed based on pain.  Also, as the wound is in his foot, it would not be surprising to see drainage and/or bleeding occasionally.  -He was given a note to elevate and rest his foot at work every 3 hours x15 minutes as needed pain x2 weeks.  Restrictions will lapse unless otherwise noted  -I encouraged him to bring extra Band-Aids and extra socks to work should this happen to bleed through.  I do not see any hypergranulation tissue that would require cauterization  -He has an appointment to see me next Thursday.  Follow-up for reassessment    Follow up: Next Thursday or sooner with acute issues           Deacon Tavares DPM FACFAS FACFAOM  Podiatric Foot & Ankle Surgeon  [facility]  [phone]    Disclaimer: This note consists of symbols derived from keyboarding, dictation and/or voice recognition software. As a result, there may be errors in the script that have gone undetected. Please consider this when interpreting information found in this chart.

## 2023-06-05 NOTE — TELEPHONE ENCOUNTER
Patient calling  rec'd lab results that Dr Lyons would like to start him on Jardiance. Patient scheduled appt 6/21/2023  To discuss weight loss without trying to lose weight. Patient would like to know more about side effects of Jardiance. Ok to call and gilbert [phone] Please call Wednesday as this is his day off

## 2023-06-06 ENCOUNTER — PATIENT OUTREACH (OUTPATIENT)
Dept: CARE COORDINATION | Facility: CLINIC | Age: 53
End: 2023-06-06
Payer: COMMERCIAL

## 2023-06-06 ASSESSMENT — ACTIVITIES OF DAILY LIVING (ADL): DEPENDENT_IADLS:: INDEPENDENT

## 2023-06-06 NOTE — PROGRESS NOTES
Clinic Care Coordination Contact    Clinic Care Coordination Contact  OUTREACH    Referral Information:  Referral Source: Other, specify ([facility])    Chief Complaint   Patient presents with     Clinic Care Coordination - Initial      Universal Utilization: Reviewed 6/6/2023  Clinic Utilization  Difficulty keeping appointments:: No  Compliance Concerns: No  No-Show Concerns: No  No PCP office visit in Past Year: Yes  Utilization    Hospital Admissions  0             ED Visits  4             No Show Count (past year)  0                Current as of: 6/5/2023  8:56 PM            Clinical Concerns:  Current Medical Concerns:    Patient Active Problem List   Diagnosis     Diabetes mellitus, type 2 (H)     Anxiety     Major depressive disorder, recurrent episode, moderate (H)     Learning disability     Hyperlipidemia     Primary insomnia     Type 2 diabetes mellitus without complication, without long-term current use of insulin (H)     Benign essential hypertension   Current Behavioral Concerns: see above     Education Provided to patient: SWCC role and reason for call.    Pain  Pain (GOAL):: No  Health Maintenance Reviewed: Due/Overdue   Health Maintenance Due   Topic Date Due     ADVANCE CARE PLANNING  Never done     DEPRESSION ACTION PLAN  Never done     EYE EXAM  Never done     COLORECTAL CANCER SCREENING  Never done     Pneumococcal Vaccine: Pediatrics (0 to 5 Years) and At-Risk Patients (6 to 64 Years) (2 - PCV) 11/08/2011     ZOSTER IMMUNIZATION (2 of 2) 10/19/2021     YEARLY PREVENTIVE VISIT  07/29/2022   Clinical Pathway: None    Medication Management:  Medication review status: Medications reviewed and no changes reported per patient.        Current Outpatient Medications   Medication     glipiZIDE (GLUCOTROL) 5 MG tablet     silver sulfADIAZINE (SILVADENE) 1 % external cream     No current facility-administered medications for this visit.     Functional Status:  Dependent ADLs:: Independent  Dependent IADLs::  Independent  Bed or wheelchair confined:: No  Mobility Status: Independent    Living Situation:  Current living arrangement:: I live alone    Lifestyle & Psychosocial Needs:  Social Determinants of Health     Tobacco Use: Low Risk  (5/26/2023)    Patient History      Smoking Tobacco Use: Never      Smokeless Tobacco Use: Never      Passive Exposure: Not on file   Alcohol Use: Not on file   Financial Resource Strain: Not on file   Food Insecurity: Not on file   Transportation Needs: Not on file   Physical Activity: Not on file   Stress: Not on file   Social Connections: Not on file   Intimate Partner Violence: Not on file   Depression: At risk (5/26/2023)    PHQ-2      PHQ-2 Score: 4   Housing Stability: Not on file     Diet:: Regular  Inadequate nutrition (GOAL):: No  Tube Feeding: No  Inadequate activity/exercise (GOAL):: No  Significant changes in sleep pattern (GOAL): No  Transportation means:: Regular car     Rastafari or spiritual beliefs that impact treatment:: No  Mental health DX:: Yes  Chemical Dependency Status: No Current Concerns     CC spoke with patient to introduce self and offer Care Coordination services. Previously spoke with patient 5/9/2023 regarding CC services, however, at that time patient was unsure of potential needs. Patient requested follow-up call after establishing care with PCP. Per chart review, patient established care with PCP on 5/26/2023 and no CC needs were identified. Patient reports he is now back to work on a part time basis. Allowed time and space for patient to voice any needs. Patient shared he has had a difficult time adjusting back into work due to management not respecting his work restrictions. Validated patients feelings and provided emotional support. Patient shared he is working with HR and Work Comp regarding working restrictions. CC encouraged patient to continue to work with above parties regarding work/job restrictions. Patient in agreement with plan. Patient  shared due to not working full time he is on a limited income. Patient requesting resources for local food shelves. Offered to send patient list of local food shelves and community meals via mail. Patient in agreement with plan. Please refer to letter sent 6/6/2023 for further details regarding resources provided. No further CC needs identified at this time. Patient politely declined CC services and shared preference is to contact [facility] if any additional needs arise.      Resources and Interventions:  Current Resources:   Community Resources: None  Employment Status: employed full-time (Currently on Work Comp)  Advance Care Plan/Directive  Advanced Care Plans/Directives on file:: No  Referrals Placed: None     Care Plan: None     Future Appointments              In 1 week Deacon Tavares DPM [facility], [place] - [place]    In 2 weeks Quincy Lyons MD [place], [place]        Plan: Patient was provided with  CC's contact information and encouraged to call with questions, concerns, and/or support needs.  CC will remain available as needed. No further care coordination outreaches will be made at this time.     FARHAN Hernandez/LICSW  Social Work Care Coordinator  [facility], and [place]  Phone: [phone]

## 2023-06-06 NOTE — LETTER
[facility] CARE COORDINATION  [address] [place] [address]    June 6, 2023      Scout Perez  [address] [facility] [address]          Dear Scout,    Thank you for taking time out of your day to speak with me. Below are the resources we discussed over the phone. Please review and call me with any questions.      Sincerely,     Whitney Barrett, MSW/LICSW  Social Work Care Coordinator  [facility] [place], [place], and [place]  Phone: [phone]      Resources    Food Shelves & Community Meals:  Food Shelves:  360 Communities:  [place]: [address] [facility] [address], [address] - [phone]  [place]: [address] [facility] - [phone]  [place]: [place], [address] - [phone]  [place]: [place] [facility]: [address] [facility] - [phone]  [place]: [place] Food Shelf, [address] [facility] - [phone]    Residents of [place], [place], [place], [place], [place], [place], [place], [place] or [facility], can access [address] [facility] 24-hours a day by calling [phone].    The [employer].A.P[employer] Store:  [address] - [phone]    The Open Door:  [address] [facility] - [phone]    Mobile Pantry:  Garden to Table Program  Mobile Lunch Box Program  [address] - [phone]    [facility] Outpost:  [address] [facility] - [phone]    [employer] (Serving [facility]):   [address] [facility] - [phone]    [facility]:  [address] [facility], [phone]  *This food shelf serves [place], [place], [place], [place], [place], [place] and [place]    [facility]'s Cupboard:  [address] [facility] [address], [phone]  Email: [email or web address]    Hours: Wednesdays 4:00-6:45 p.m. (or by  special arrangement made by phone)    Joaquina's Pantry:  Pop-up pantries at numerous locations in [place] and [place] every month. There is a $20 donation per food share.    Free Community Meals:  Tawanna Ebony [facility] - [phone]  [facility] [address], [place]  1st, 2nd and 4th Mondays of each month Sept.-May at 6 to 6:30 p.m.  1st and 4th Monday of each month June-August, 6 to 6:30 p.m.    [facility] - [phone]  [address] [facility]  Every Wednesday, from 5-7 p.m.    [facility] - [phone]  [address] [facility]  Every Thursday 5:30-6:30 p.m.    [facility] - [phone]  [address] [facility]  4th and 5th Thursdays of each month 6 p.m.    [facility] - [phone]  [address] [facility]  4th Thursday of each month, 5 p.m.    [facility] - [phone] (PRAY)  [address] [facility]  Wednesday (every week fall through spring) 5:45 p.m. (donation requested)    D[employer] on the [place] (provided by [employer])  [address] [facility]  2nd Thursday of each month, 6-7 p.m.    [place]! [facility] - [phone]  [address] [facility]  Every Tuesday 5-6:15 p.m.     [facility] [place] [facility] [place] - [phone]  [address] [facility]  Monday-Thursday,  5:30-6:30 p.m.    Christian Garcia [facility] - [phone]  [address] [place]  Monday-Thursday, 5:30-6:30 p.m.

## 2023-06-07 NOTE — TELEPHONE ENCOUNTER
Jardiance, side effects include arthralgias, urinary frequency, and nausea.  I would recommend that he strongly consider starting this medication given that his blood sugar is still uncontrolled with a hemoglobin A1c of 10.6.

## 2023-06-08 ENCOUNTER — TELEPHONE (OUTPATIENT)
Dept: INTERNAL MEDICINE | Facility: CLINIC | Age: 53
End: 2023-06-08
Payer: COMMERCIAL

## 2023-06-08 NOTE — TELEPHONE ENCOUNTER
There is also another telephone encounter on patient dated 6/5/23 regarding side effects from Jardiance.    Please give patient provider's message.

## 2023-06-08 NOTE — TELEPHONE ENCOUNTER
There is also another telephone encounter on patient dated 6/8/23.    Please give patient provider's message.

## 2023-06-08 NOTE — TELEPHONE ENCOUNTER
S-(situation): Patient calls. Increased appetite    B-(background): Started glipizide 5 mg BID with meals. Started about 2 weeks ago.     LOV: 5/26/2023     A-(assessment): Since then, has been eating more at meals. And waking up at night eating additional meals. Notes he has lost weight in the last 10 years was at 285, now at 250. 10 years ago was taking addional medications. Was [id number] pills at that time. Thinks he gained weight from depression meds.     Had recent foot injury - didn't eat a lot for a month, so maybe gaining appetite back now?    Don't have a meter to check BS.    Patient is available Monday - after 5. Off Tuesday/Thursday if needing addional appt, labs, etc.     R-(recommendations): Routing to provider to review and advise.     Malou Shah RN  [place] T[facility]

## 2023-06-14 ENCOUNTER — TELEPHONE (OUTPATIENT)
Dept: PODIATRY | Facility: CLINIC | Age: 53
End: 2023-06-14
Payer: COMMERCIAL

## 2023-06-15 ENCOUNTER — OFFICE VISIT (OUTPATIENT)
Dept: PODIATRY | Facility: CLINIC | Age: 53
End: 2023-06-15
Payer: OTHER MISCELLANEOUS

## 2023-06-15 VITALS
SYSTOLIC BLOOD PRESSURE: 126 MMHG | DIASTOLIC BLOOD PRESSURE: 72 MMHG | WEIGHT: 265 LBS | HEIGHT: 75 IN | BODY MASS INDEX: 32.95 KG/M2

## 2023-06-15 DIAGNOSIS — Z02.6 WORKER'S COMPENSATION CLAIM ADMINISTRATIVE PROBLEM: ICD-10-CM

## 2023-06-15 DIAGNOSIS — T25.222A BURN, FOOT, SECOND DEGREE, LEFT, INITIAL ENCOUNTER: Primary | ICD-10-CM

## 2023-06-15 PROCEDURE — 99213 OFFICE O/P EST LOW 20 MIN: CPT | Performed by: PODIATRIST

## 2023-06-15 NOTE — LETTER
"    6/15/2023         RE: Scout Perez  [address] [facility] [address]        Dear Colleague,    Thank you for referring your patient, Scout Perez, to the [facility] PODIATRY. Please see a copy of my visit note below.    Foot & Ankle Surgery   Imelda 15, 2023    S:  Pt is seen today for evaluation of right foot work comp injury/burn.  He states pain levels are improving, although he does notice discomfort in the area when he is on his feet for extended periods of time, including being at work for 8 to 9 hours straight.  He is able to rest and elevate his foot every 3 hours x15 minutes at work.  He noticed it is not bleeding much at this point..    Vitals:    06/15/23 1035   BP: 126/72   Weight: 120.2 kg (265 lb)   Height: 1.892 m (6' 2.5\")   '      ROS - Pos for CC.  Patient denies current nausea, vomiting, chills, fevers, belly pain, calf pain, chest pain or SOB.  Complete remainder of ROS it otherwise neg.      PE:  Gen:   No apparent distress  Eye:    Visual scanning without deficit  Ear:    Response to auditory stimuli wnl  Lung:    Non-labored breathing on RA noted  Abd:    NTND per patient report  Lymph:    Neg for pitting/non-pitting edema BLE  Vasc:    Pulses palpable, CFT minimally delayed  Neuro:    Light touch sensation intact to all sensory nerve distributions without paresthesias  Derm:    The burn at the dorsal right fifth toe base appears almost fully epithelialized at this point without bleeding or local signs of infection.  MSK:    ROM, strength wnl without limitation, no pain on palpation noted.  Calf:    Neg for redness, swelling or tenderness      Assessment:  53 year old male with nearly fully healed right foot work comp injury/burn      Medical Decision Making/Plan:  Discussed etiologies, anatomy and options  1.  Nearly fully healed right foot work comp injury/burn  -No debridement was necessary today  -No indication for oral antibiotics  -Continue wound care: " Wash thoroughly, dry thoroughly, antibiotic ointment and a Band-Aid daily until healed  -He was given a letter to continue current work restrictions including resting/elevating his foot every 3 hours x15 minutes as needed as needed pain.  We will update his status in 1 week.  Our goal at that point is to have him to return to work without restrictions    Follow up: 1 week or sooner with acute issues           Deacon Tavares DPM [facility] FACFAOM  Podiatric Foot & Ankle Surgeon  [facility]  [phone]    Disclaimer: This note consists of symbols derived from keyboarding, dictation and/or voice recognition software. As a result, there may be errors in the script that have gone undetected. Please consider this when interpreting information found in this chart.          Again, thank you for allowing me to participate in the care of your patient.        Sincerely,        Deacon Tavares DPM, [place]

## 2023-06-15 NOTE — PROGRESS NOTES
"Foot & Ankle Surgery   Imelda 15, 2023    S:  Pt is seen today for evaluation of right foot work comp injury/burn.  He states pain levels are improving, although he does notice discomfort in the area when he is on his feet for extended periods of time, including being at work for 8 to 9 hours straight.  He is able to rest and elevate his foot every 3 hours x15 minutes at work.  He noticed it is not bleeding much at this point..    Vitals:    06/15/23 1035   BP: 126/72   Weight: 120.2 kg (265 lb)   Height: 1.892 m (6' 2.5\")   '      ROS - Pos for CC.  Patient denies current nausea, vomiting, chills, fevers, belly pain, calf pain, chest pain or SOB.  Complete remainder of ROS it otherwise neg.      PE:  Gen:   No apparent distress  Eye:    Visual scanning without deficit  Ear:    Response to auditory stimuli wnl  Lung:    Non-labored breathing on RA noted  Abd:    NTND per patient report  Lymph:    Neg for pitting/non-pitting edema BLE  Vasc:    Pulses palpable, CFT minimally delayed  Neuro:    Light touch sensation intact to all sensory nerve distributions without paresthesias  Derm:    The burn at the dorsal right fifth toe base appears almost fully epithelialized at this point without bleeding or local signs of infection.  MSK:    ROM, strength wnl without limitation, no pain on palpation noted.  Calf:    Neg for redness, swelling or tenderness      Assessment:  53 year old male with nearly fully healed right foot work comp injury/burn      Medical Decision Making/Plan:  Discussed etiologies, anatomy and options  1.  Nearly fully healed right foot work comp injury/burn  -No debridement was necessary today  -No indication for oral antibiotics  -Continue wound care: Wash thoroughly, dry thoroughly, antibiotic ointment and a Band-Aid daily until healed  -He was given a letter to continue current work restrictions including resting/elevating his foot every 3 hours x15 minutes as needed as needed pain.  We will update his " status in 1 week.  Our goal at that point is to have him to return to work without restrictions    Follow up: 1 week or sooner with acute issues           Deacon Tavares DPM [facility]  Podiatric Foot & Ankle Surgeon  [facility]  [phone]    Disclaimer: This note consists of symbols derived from keyboarding, dictation and/or voice recognition software. As a result, there may be errors in the script that have gone undetected. Please consider this when interpreting information found in this chart.

## 2023-06-15 NOTE — LETTER
Imelda 15, 2023      Scout Perez  [address] [facility] [address]        To Whom It May Concern:    Scout Perez was seen in our clinic for reassessment of his right foot working comp injury.  Continue current restrictions:    1.  Rest, ice, elevate the foot every 3 hours x15 minutes as needed based on pain.    He will be seen in 1 week for reassessment and his status will be updated at that point.  Our goal is to have him return to work without restrictions at that point.      Sincerely,      Deacon Tavares DPM FACFAS FACFAOM  Podiatric Foot & Ankle Surgeon  [facility]

## 2023-06-20 ENCOUNTER — APPOINTMENT (OUTPATIENT)
Dept: CARDIOLOGY | Facility: CLINIC | Age: 53
DRG: 392 | End: 2023-06-20
Attending: EMERGENCY MEDICINE
Payer: COMMERCIAL

## 2023-06-20 ENCOUNTER — HOSPITAL ENCOUNTER (INPATIENT)
Facility: CLINIC | Age: 53
LOS: 1 days | Discharge: HOME | DRG: 392 | End: 2023-06-21
Attending: EMERGENCY MEDICINE | Admitting: HOSPITALIST
Payer: COMMERCIAL

## 2023-06-20 ENCOUNTER — APPOINTMENT (OUTPATIENT)
Dept: CT IMAGING | Facility: CLINIC | Age: 53
DRG: 392 | End: 2023-06-20
Attending: EMERGENCY MEDICINE
Payer: COMMERCIAL

## 2023-06-20 DIAGNOSIS — F41.9 ANXIETY: ICD-10-CM

## 2023-06-20 DIAGNOSIS — E08.65 DIABETES MELLITUS DUE TO UNDERLYING CONDITION WITH HYPERGLYCEMIA, WITHOUT LONG-TERM CURRENT USE OF INSULIN (H): ICD-10-CM

## 2023-06-20 DIAGNOSIS — R07.9 CHEST PAIN, UNSPECIFIED TYPE: ICD-10-CM

## 2023-06-20 DIAGNOSIS — R79.89 ELEVATED LACTIC ACID LEVEL: ICD-10-CM

## 2023-06-20 DIAGNOSIS — R10.13 ABDOMINAL PAIN, EPIGASTRIC: ICD-10-CM

## 2023-06-20 DIAGNOSIS — R00.0 SINUS TACHYCARDIA: ICD-10-CM

## 2023-06-20 DIAGNOSIS — F15.10 METHAMPHETAMINE ABUSE (H): ICD-10-CM

## 2023-06-20 LAB
ALBUMIN SERPL BCG-MCNC: 4.8 G/DL (ref 3.5–5.2)
ALBUMIN UR-MCNC: 200 MG/DL
ALP SERPL-CCNC: 84 U/L (ref 40–129)
ALT SERPL W P-5'-P-CCNC: 46 U/L (ref 0–70)
AMPHETAMINES UR QL SCN: ABNORMAL
ANION GAP SERPL CALCULATED.3IONS-SCNC: 11 MMOL/L (ref 7–15)
ANION GAP SERPL CALCULATED.3IONS-SCNC: 23 MMOL/L (ref 7–15)
APPEARANCE UR: CLEAR
AST SERPL W P-5'-P-CCNC: 23 U/L (ref 0–45)
ATRIAL RATE - MUSE: 137 BPM
BARBITURATES UR QL SCN: ABNORMAL
BASOPHILS # BLD AUTO: 0 10E3/UL (ref 0–0.2)
BASOPHILS NFR BLD AUTO: 0 %
BENZODIAZ UR QL SCN: ABNORMAL
BILIRUB DIRECT SERPL-MCNC: <0.2 MG/DL (ref 0–0.3)
BILIRUB SERPL-MCNC: 0.7 MG/DL
BILIRUB UR QL STRIP: NEGATIVE
BUN SERPL-MCNC: 13.3 MG/DL (ref 6–20)
BUN SERPL-MCNC: 9.3 MG/DL (ref 6–20)
BZE UR QL SCN: ABNORMAL
CALCIUM SERPL-MCNC: 8.5 MG/DL (ref 8.6–10)
CALCIUM SERPL-MCNC: 9.8 MG/DL (ref 8.6–10)
CANNABINOIDS UR QL SCN: ABNORMAL
CHLORIDE SERPL-SCNC: 106 MMOL/L (ref 98–107)
CHLORIDE SERPL-SCNC: 97 MMOL/L (ref 98–107)
COLOR UR AUTO: ABNORMAL
CREAT SERPL-MCNC: 0.59 MG/DL (ref 0.67–1.17)
CREAT SERPL-MCNC: 0.75 MG/DL (ref 0.67–1.17)
DEPRECATED HCO3 PLAS-SCNC: 16 MMOL/L (ref 22–29)
DEPRECATED HCO3 PLAS-SCNC: 21 MMOL/L (ref 22–29)
DIASTOLIC BLOOD PRESSURE - MUSE: NORMAL MMHG
EOSINOPHIL # BLD AUTO: 0 10E3/UL (ref 0–0.7)
EOSINOPHIL NFR BLD AUTO: 0 %
ERYTHROCYTE [DISTWIDTH] IN BLOOD BY AUTOMATED COUNT: 12.7 % (ref 10–15)
ERYTHROCYTE [DISTWIDTH] IN BLOOD BY AUTOMATED COUNT: 12.7 % (ref 10–15)
ETHANOL SERPL-MCNC: <0.01 G/DL
FLUAV RNA SPEC QL NAA+PROBE: NEGATIVE
FLUBV RNA RESP QL NAA+PROBE: NEGATIVE
GFR SERPL CREATININE-BSD FRML MDRD: >90 ML/MIN/1.73M2
GFR SERPL CREATININE-BSD FRML MDRD: >90 ML/MIN/1.73M2
GLUCOSE BLDC GLUCOMTR-MCNC: 204 MG/DL (ref 70–99)
GLUCOSE BLDC GLUCOMTR-MCNC: 216 MG/DL (ref 70–99)
GLUCOSE BLDC GLUCOMTR-MCNC: 319 MG/DL (ref 70–99)
GLUCOSE SERPL-MCNC: 238 MG/DL (ref 70–99)
GLUCOSE SERPL-MCNC: 461 MG/DL (ref 70–99)
GLUCOSE UR STRIP-MCNC: >=1000 MG/DL
GRANULAR CAST: 13 /LPF
HCO3 BLDV-SCNC: 20 MMOL/L (ref 21–28)
HCO3 BLDV-SCNC: 21 MMOL/L (ref 21–28)
HCO3 BLDV-SCNC: 21 MMOL/L (ref 21–28)
HCT VFR BLD AUTO: 44.3 % (ref 40–53)
HCT VFR BLD AUTO: 50.1 % (ref 40–53)
HGB BLD-MCNC: 15.8 G/DL (ref 13.3–17.7)
HGB BLD-MCNC: 18 G/DL (ref 13.3–17.7)
HGB UR QL STRIP: ABNORMAL
IMM GRANULOCYTES # BLD: 0.1 10E3/UL
IMM GRANULOCYTES NFR BLD: 1 %
INTERPRETATION ECG - MUSE: NORMAL
KETONES UR STRIP-MCNC: 40 MG/DL
LACTATE BLD-SCNC: 2.1 MMOL/L
LACTATE BLD-SCNC: 3.5 MMOL/L
LACTATE BLD-SCNC: 4.6 MMOL/L
LACTATE SERPL-SCNC: 2.1 MMOL/L (ref 0.7–2)
LACTATE SERPL-SCNC: 2.8 MMOL/L (ref 0.7–2)
LACTATE SERPL-SCNC: 3.3 MMOL/L (ref 0.7–2)
LEUKOCYTE ESTERASE UR QL STRIP: NEGATIVE
LIPASE SERPL-CCNC: 15 U/L (ref 13–60)
LVEF ECHO: NORMAL
LYMPHOCYTES # BLD AUTO: 0.9 10E3/UL (ref 0.8–5.3)
LYMPHOCYTES NFR BLD AUTO: 6 %
MCH RBC QN AUTO: 28.9 PG (ref 26.5–33)
MCH RBC QN AUTO: 28.9 PG (ref 26.5–33)
MCHC RBC AUTO-ENTMCNC: 35.7 G/DL (ref 31.5–36.5)
MCHC RBC AUTO-ENTMCNC: 35.9 G/DL (ref 31.5–36.5)
MCV RBC AUTO: 81 FL (ref 78–100)
MCV RBC AUTO: 81 FL (ref 78–100)
MONOCYTES # BLD AUTO: 0.6 10E3/UL (ref 0–1.3)
MONOCYTES NFR BLD AUTO: 4 %
MUCOUS THREADS #/AREA URNS LPF: PRESENT /LPF
NEUTROPHILS # BLD AUTO: 13.7 10E3/UL (ref 1.6–8.3)
NEUTROPHILS NFR BLD AUTO: 89 %
NITRATE UR QL: NEGATIVE
NRBC # BLD AUTO: 0 10E3/UL
NRBC BLD AUTO-RTO: 0 /100
NT-PROBNP SERPL-MCNC: 313 PG/ML (ref 0–900)
OPIATES UR QL SCN: ABNORMAL
P AXIS - MUSE: 67 DEGREES
PCO2 BLDV: 33 MM HG (ref 40–50)
PCO2 BLDV: 34 MM HG (ref 40–50)
PCO2 BLDV: 34 MM HG (ref 40–50)
PCP QUAL URINE (ROCHE): ABNORMAL
PH BLDV: 7.39 [PH] (ref 7.32–7.43)
PH BLDV: 7.39 [PH] (ref 7.32–7.43)
PH BLDV: 7.41 [PH] (ref 7.32–7.43)
PH UR STRIP: 5.5 [PH] (ref 5–7)
PLATELET # BLD AUTO: 240 10E3/UL (ref 150–450)
PLATELET # BLD AUTO: 272 10E3/UL (ref 150–450)
PO2 BLDV: 45 MM HG (ref 25–47)
PO2 BLDV: 56 MM HG (ref 25–47)
PO2 BLDV: 58 MM HG (ref 25–47)
POTASSIUM SERPL-SCNC: 3.6 MMOL/L (ref 3.4–5.3)
POTASSIUM SERPL-SCNC: 4.6 MMOL/L (ref 3.4–5.3)
PR INTERVAL - MUSE: 134 MS
PROCALCITONIN SERPL IA-MCNC: 0.1 NG/ML
PROT SERPL-MCNC: 8 G/DL (ref 6.4–8.3)
QRS DURATION - MUSE: 82 MS
QT - MUSE: 294 MS
QTC - MUSE: 443 MS
R AXIS - MUSE: 10 DEGREES
RBC # BLD AUTO: 5.46 10E6/UL (ref 4.4–5.9)
RBC # BLD AUTO: 6.22 10E6/UL (ref 4.4–5.9)
RBC URINE: 1 /HPF
RSV RNA SPEC NAA+PROBE: NEGATIVE
SAO2 % BLDV: 82 % (ref 94–100)
SAO2 % BLDV: 89 % (ref 94–100)
SAO2 % BLDV: 90 % (ref 94–100)
SARS-COV-2 RNA RESP QL NAA+PROBE: NEGATIVE
SODIUM SERPL-SCNC: 136 MMOL/L (ref 136–145)
SODIUM SERPL-SCNC: 138 MMOL/L (ref 136–145)
SP GR UR STRIP: 1.03 (ref 1–1.03)
SQUAMOUS EPITHELIAL: <1 /HPF
SYSTOLIC BLOOD PRESSURE - MUSE: NORMAL MMHG
T AXIS - MUSE: 41 DEGREES
TROPONIN T SERPL HS-MCNC: 18 NG/L
TROPONIN T SERPL HS-MCNC: 19 NG/L
TROPONIN T SERPL HS-MCNC: 20 NG/L
TSH SERPL DL<=0.005 MIU/L-ACNC: 2.4 UIU/ML (ref 0.3–4.2)
UROBILINOGEN UR STRIP-MCNC: NORMAL MG/DL
VENTRICULAR RATE- MUSE: 137 BPM
WBC # BLD AUTO: 11.2 10E3/UL (ref 4–11)
WBC # BLD AUTO: 15.3 10E3/UL (ref 4–11)
WBC URINE: 1 /HPF

## 2023-06-20 PROCEDURE — 36415 COLL VENOUS BLD VENIPUNCTURE: CPT | Performed by: HOSPITALIST

## 2023-06-20 PROCEDURE — 87040 BLOOD CULTURE FOR BACTERIA: CPT | Performed by: EMERGENCY MEDICINE

## 2023-06-20 PROCEDURE — 999N000208 ECHOCARDIOGRAM COMPLETE

## 2023-06-20 PROCEDURE — 83605 ASSAY OF LACTIC ACID: CPT | Performed by: HOSPITALIST

## 2023-06-20 PROCEDURE — 82310 ASSAY OF CALCIUM: CPT | Performed by: HOSPITALIST

## 2023-06-20 PROCEDURE — 93005 ELECTROCARDIOGRAM TRACING: CPT

## 2023-06-20 PROCEDURE — 84484 ASSAY OF TROPONIN QUANT: CPT | Performed by: EMERGENCY MEDICINE

## 2023-06-20 PROCEDURE — 85025 COMPLETE CBC W/AUTO DIFF WBC: CPT | Performed by: EMERGENCY MEDICINE

## 2023-06-20 PROCEDURE — 83605 ASSAY OF LACTIC ACID: CPT

## 2023-06-20 PROCEDURE — 81001 URINALYSIS AUTO W/SCOPE: CPT | Performed by: EMERGENCY MEDICINE

## 2023-06-20 PROCEDURE — 250N000013 HC RX MED GY IP 250 OP 250 PS 637: Performed by: HOSPITALIST

## 2023-06-20 PROCEDURE — 250N000011 HC RX IP 250 OP 636: Performed by: EMERGENCY MEDICINE

## 2023-06-20 PROCEDURE — 36415 COLL VENOUS BLD VENIPUNCTURE: CPT

## 2023-06-20 PROCEDURE — C9113 INJ PANTOPRAZOLE SODIUM, VIA: HCPCS | Performed by: HOSPITALIST

## 2023-06-20 PROCEDURE — 85027 COMPLETE CBC AUTOMATED: CPT | Performed by: HOSPITALIST

## 2023-06-20 PROCEDURE — 83690 ASSAY OF LIPASE: CPT | Performed by: HOSPITALIST

## 2023-06-20 PROCEDURE — 84484 ASSAY OF TROPONIN QUANT: CPT | Performed by: HOSPITALIST

## 2023-06-20 PROCEDURE — 255N000002 HC RX 255 OP 636: Performed by: EMERGENCY MEDICINE

## 2023-06-20 PROCEDURE — 87637 SARSCOV2&INF A&B&RSV AMP PRB: CPT | Performed by: EMERGENCY MEDICINE

## 2023-06-20 PROCEDURE — 83880 ASSAY OF NATRIURETIC PEPTIDE: CPT | Performed by: EMERGENCY MEDICINE

## 2023-06-20 PROCEDURE — 99285 EMERGENCY DEPT VISIT HI MDM: CPT | Mod: 25

## 2023-06-20 PROCEDURE — 120N000013 HC R&B IMCU

## 2023-06-20 PROCEDURE — 250N000009 HC RX 250: Performed by: HOSPITALIST

## 2023-06-20 PROCEDURE — 93306 TTE W/DOPPLER COMPLETE: CPT | Mod: 26 | Performed by: INTERNAL MEDICINE

## 2023-06-20 PROCEDURE — 82248 BILIRUBIN DIRECT: CPT | Performed by: EMERGENCY MEDICINE

## 2023-06-20 PROCEDURE — 250N000009 HC RX 250: Performed by: EMERGENCY MEDICINE

## 2023-06-20 PROCEDURE — 80307 DRUG TEST PRSMV CHEM ANLYZR: CPT | Performed by: EMERGENCY MEDICINE

## 2023-06-20 PROCEDURE — 258N000003 HC RX IP 258 OP 636: Performed by: HOSPITALIST

## 2023-06-20 PROCEDURE — 250N000013 HC RX MED GY IP 250 OP 250 PS 637: Performed by: EMERGENCY MEDICINE

## 2023-06-20 PROCEDURE — 96375 TX/PRO/DX INJ NEW DRUG ADDON: CPT

## 2023-06-20 PROCEDURE — 258N000003 HC RX IP 258 OP 636: Performed by: EMERGENCY MEDICINE

## 2023-06-20 PROCEDURE — 84443 ASSAY THYROID STIM HORMONE: CPT | Performed by: HOSPITALIST

## 2023-06-20 PROCEDURE — 36415 COLL VENOUS BLD VENIPUNCTURE: CPT | Performed by: EMERGENCY MEDICINE

## 2023-06-20 PROCEDURE — 82962 GLUCOSE BLOOD TEST: CPT

## 2023-06-20 PROCEDURE — 82077 ASSAY SPEC XCP UR&BREATH IA: CPT | Performed by: EMERGENCY MEDICINE

## 2023-06-20 PROCEDURE — 82803 BLOOD GASES ANY COMBINATION: CPT

## 2023-06-20 PROCEDURE — 96361 HYDRATE IV INFUSION ADD-ON: CPT

## 2023-06-20 PROCEDURE — 80053 COMPREHEN METABOLIC PANEL: CPT | Performed by: EMERGENCY MEDICINE

## 2023-06-20 PROCEDURE — 99223 1ST HOSP IP/OBS HIGH 75: CPT | Performed by: HOSPITALIST

## 2023-06-20 PROCEDURE — 250N000011 HC RX IP 250 OP 636: Mod: JZ | Performed by: HOSPITALIST

## 2023-06-20 PROCEDURE — 120N000001 HC R&B MED SURG/OB

## 2023-06-20 PROCEDURE — 84145 PROCALCITONIN (PCT): CPT | Performed by: EMERGENCY MEDICINE

## 2023-06-20 PROCEDURE — 74177 CT ABD & PELVIS W/CONTRAST: CPT

## 2023-06-20 PROCEDURE — 96365 THER/PROPH/DIAG IV INF INIT: CPT | Mod: 59

## 2023-06-20 RX ORDER — AMOXICILLIN 250 MG
1 CAPSULE ORAL 2 TIMES DAILY PRN
Status: DISCONTINUED | OUTPATIENT
Start: 2023-06-20 | End: 2023-06-21 | Stop reason: HOSPADM

## 2023-06-20 RX ORDER — ACETAMINOPHEN 650 MG/1
650 SUPPOSITORY RECTAL EVERY 6 HOURS PRN
Status: DISCONTINUED | OUTPATIENT
Start: 2023-06-20 | End: 2023-06-21 | Stop reason: HOSPADM

## 2023-06-20 RX ORDER — PIPERACILLIN SODIUM, TAZOBACTAM SODIUM 4; .5 G/20ML; G/20ML
4.5 INJECTION, POWDER, LYOPHILIZED, FOR SOLUTION INTRAVENOUS EVERY 6 HOURS
Status: DISCONTINUED | OUTPATIENT
Start: 2023-06-20 | End: 2023-06-21 | Stop reason: HOSPADM

## 2023-06-20 RX ORDER — DEXTROSE MONOHYDRATE 25 G/50ML
25-50 INJECTION, SOLUTION INTRAVENOUS
Status: DISCONTINUED | OUTPATIENT
Start: 2023-06-20 | End: 2023-06-21 | Stop reason: HOSPADM

## 2023-06-20 RX ORDER — LORAZEPAM 2 MG/ML
1 INJECTION INTRAMUSCULAR ONCE
Status: COMPLETED | OUTPATIENT
Start: 2023-06-20 | End: 2023-06-20

## 2023-06-20 RX ORDER — LIDOCAINE 40 MG/G
CREAM TOPICAL
Status: DISCONTINUED | OUTPATIENT
Start: 2023-06-20 | End: 2023-06-21 | Stop reason: HOSPADM

## 2023-06-20 RX ORDER — HYDRALAZINE HYDROCHLORIDE 20 MG/ML
10 INJECTION INTRAMUSCULAR; INTRAVENOUS
Status: DISCONTINUED | OUTPATIENT
Start: 2023-06-20 | End: 2023-06-21 | Stop reason: HOSPADM

## 2023-06-20 RX ORDER — NICOTINE POLACRILEX 4 MG
15-30 LOZENGE BUCCAL
Status: DISCONTINUED | OUTPATIENT
Start: 2023-06-20 | End: 2023-06-21 | Stop reason: HOSPADM

## 2023-06-20 RX ORDER — ONDANSETRON 2 MG/ML
4 INJECTION INTRAMUSCULAR; INTRAVENOUS EVERY 30 MIN PRN
Status: DISCONTINUED | OUTPATIENT
Start: 2023-06-20 | End: 2023-06-20

## 2023-06-20 RX ORDER — NITROGLYCERIN 0.4 MG/1
0.4 TABLET SUBLINGUAL EVERY 5 MIN PRN
Status: DISCONTINUED | OUTPATIENT
Start: 2023-06-20 | End: 2023-06-20

## 2023-06-20 RX ORDER — ENOXAPARIN SODIUM 100 MG/ML
40 INJECTION SUBCUTANEOUS EVERY 24 HOURS
Status: DISCONTINUED | OUTPATIENT
Start: 2023-06-20 | End: 2023-06-21 | Stop reason: HOSPADM

## 2023-06-20 RX ORDER — LORAZEPAM 2 MG/ML
.5-1 INJECTION INTRAMUSCULAR EVERY 4 HOURS PRN
Status: DISCONTINUED | OUTPATIENT
Start: 2023-06-20 | End: 2023-06-21 | Stop reason: HOSPADM

## 2023-06-20 RX ORDER — NITROGLYCERIN 0.4 MG/1
0.4 TABLET SUBLINGUAL EVERY 5 MIN PRN
Status: DISCONTINUED | OUTPATIENT
Start: 2023-06-20 | End: 2023-06-21 | Stop reason: HOSPADM

## 2023-06-20 RX ORDER — ONDANSETRON 4 MG/1
4 TABLET, ORALLY DISINTEGRATING ORAL EVERY 6 HOURS PRN
Status: DISCONTINUED | OUTPATIENT
Start: 2023-06-20 | End: 2023-06-21 | Stop reason: HOSPADM

## 2023-06-20 RX ORDER — MORPHINE SULFATE 2 MG/ML
2 INJECTION, SOLUTION INTRAMUSCULAR; INTRAVENOUS
Status: DISCONTINUED | OUTPATIENT
Start: 2023-06-20 | End: 2023-06-21 | Stop reason: HOSPADM

## 2023-06-20 RX ORDER — AMOXICILLIN 250 MG
2 CAPSULE ORAL 2 TIMES DAILY PRN
Status: DISCONTINUED | OUTPATIENT
Start: 2023-06-20 | End: 2023-06-21 | Stop reason: HOSPADM

## 2023-06-20 RX ORDER — MAGNESIUM HYDROXIDE/ALUMINUM HYDROXICE/SIMETHICONE 120; 1200; 1200 MG/30ML; MG/30ML; MG/30ML
15 SUSPENSION ORAL ONCE
Status: COMPLETED | OUTPATIENT
Start: 2023-06-20 | End: 2023-06-20

## 2023-06-20 RX ORDER — SODIUM CHLORIDE 9 MG/ML
INJECTION, SOLUTION INTRAVENOUS CONTINUOUS
Status: DISCONTINUED | OUTPATIENT
Start: 2023-06-20 | End: 2023-06-21

## 2023-06-20 RX ORDER — SODIUM CHLORIDE 9 MG/ML
INJECTION, SOLUTION INTRAVENOUS CONTINUOUS
Status: DISCONTINUED | OUTPATIENT
Start: 2023-06-20 | End: 2023-06-20

## 2023-06-20 RX ORDER — SILVER SULFADIAZINE 10 MG/G
CREAM TOPICAL 2 TIMES DAILY
Status: DISCONTINUED | OUTPATIENT
Start: 2023-06-20 | End: 2023-06-21 | Stop reason: HOSPADM

## 2023-06-20 RX ORDER — ACETAMINOPHEN 325 MG/1
650 TABLET ORAL EVERY 6 HOURS PRN
Status: DISCONTINUED | OUTPATIENT
Start: 2023-06-20 | End: 2023-06-21 | Stop reason: HOSPADM

## 2023-06-20 RX ORDER — GLIPIZIDE 5 MG/1
5 TABLET ORAL ONCE
Status: COMPLETED | OUTPATIENT
Start: 2023-06-20 | End: 2023-06-20

## 2023-06-20 RX ORDER — PROCHLORPERAZINE 25 MG
25 SUPPOSITORY, RECTAL RECTAL EVERY 12 HOURS PRN
Status: DISCONTINUED | OUTPATIENT
Start: 2023-06-20 | End: 2023-06-21 | Stop reason: HOSPADM

## 2023-06-20 RX ORDER — PROCHLORPERAZINE MALEATE 10 MG
10 TABLET ORAL EVERY 6 HOURS PRN
Status: DISCONTINUED | OUTPATIENT
Start: 2023-06-20 | End: 2023-06-21 | Stop reason: HOSPADM

## 2023-06-20 RX ORDER — LIDOCAINE 40 MG/G
CREAM TOPICAL
Status: DISCONTINUED | OUTPATIENT
Start: 2023-06-20 | End: 2023-06-20

## 2023-06-20 RX ORDER — PIPERACILLIN SODIUM, TAZOBACTAM SODIUM 4; .5 G/20ML; G/20ML
4.5 INJECTION, POWDER, LYOPHILIZED, FOR SOLUTION INTRAVENOUS ONCE
Status: COMPLETED | OUTPATIENT
Start: 2023-06-20 | End: 2023-06-20

## 2023-06-20 RX ORDER — ONDANSETRON 2 MG/ML
4 INJECTION INTRAMUSCULAR; INTRAVENOUS EVERY 6 HOURS PRN
Status: DISCONTINUED | OUTPATIENT
Start: 2023-06-20 | End: 2023-06-21 | Stop reason: HOSPADM

## 2023-06-20 RX ORDER — MORPHINE SULFATE 4 MG/ML
4 INJECTION, SOLUTION INTRAMUSCULAR; INTRAVENOUS ONCE
Status: COMPLETED | OUTPATIENT
Start: 2023-06-20 | End: 2023-06-20

## 2023-06-20 RX ORDER — AMLODIPINE BESYLATE 5 MG/1
5 TABLET ORAL ONCE
Status: COMPLETED | OUTPATIENT
Start: 2023-06-20 | End: 2023-06-20

## 2023-06-20 RX ORDER — IOPAMIDOL 755 MG/ML
80 INJECTION, SOLUTION INTRAVASCULAR ONCE
Status: COMPLETED | OUTPATIENT
Start: 2023-06-20 | End: 2023-06-20

## 2023-06-20 RX ADMIN — SODIUM CHLORIDE 80 ML: 9 INJECTION, SOLUTION INTRAVENOUS at 11:47

## 2023-06-20 RX ADMIN — NITROGLYCERIN 0.4 MG: 0.4 TABLET SUBLINGUAL at 15:19

## 2023-06-20 RX ADMIN — PANTOPRAZOLE SODIUM 40 MG: 40 INJECTION, POWDER, FOR SOLUTION INTRAVENOUS at 20:09

## 2023-06-20 RX ADMIN — SODIUM CHLORIDE 1000 ML: 9 INJECTION, SOLUTION INTRAVENOUS at 12:13

## 2023-06-20 RX ADMIN — HUMAN ALBUMIN MICROSPHERES AND PERFLUTREN 3 ML: 10; .22 INJECTION, SOLUTION INTRAVENOUS at 17:43

## 2023-06-20 RX ADMIN — ONDANSETRON 4 MG: 2 INJECTION INTRAMUSCULAR; INTRAVENOUS at 10:17

## 2023-06-20 RX ADMIN — LORAZEPAM 1 MG: 2 INJECTION INTRAMUSCULAR; INTRAVENOUS at 14:34

## 2023-06-20 RX ADMIN — SODIUM CHLORIDE 500 ML: 9 INJECTION, SOLUTION INTRAVENOUS at 20:09

## 2023-06-20 RX ADMIN — MORPHINE SULFATE 4 MG: 4 INJECTION, SOLUTION INTRAMUSCULAR; INTRAVENOUS at 11:30

## 2023-06-20 RX ADMIN — SODIUM CHLORIDE: 9 INJECTION, SOLUTION INTRAVENOUS at 12:22

## 2023-06-20 RX ADMIN — HYDRALAZINE HYDROCHLORIDE 10 MG: 20 INJECTION INTRAMUSCULAR; INTRAVENOUS at 15:20

## 2023-06-20 RX ADMIN — PIPERACILLIN AND TAZOBACTAM 4.5 G: 4; .5 INJECTION, POWDER, FOR SOLUTION INTRAVENOUS at 20:09

## 2023-06-20 RX ADMIN — NITROGLYCERIN 0.4 MG: 0.4 TABLET SUBLINGUAL at 14:18

## 2023-06-20 RX ADMIN — IOPAMIDOL 150 ML: 755 INJECTION, SOLUTION INTRAVENOUS at 11:48

## 2023-06-20 RX ADMIN — SODIUM CHLORIDE 1000 ML: 9 INJECTION, SOLUTION INTRAVENOUS at 10:15

## 2023-06-20 RX ADMIN — GLIPIZIDE 5 MG: 5 TABLET ORAL at 11:29

## 2023-06-20 RX ADMIN — SODIUM CHLORIDE 1000 ML: 9 INJECTION, SOLUTION INTRAVENOUS at 16:34

## 2023-06-20 RX ADMIN — ENOXAPARIN SODIUM 40 MG: 40 INJECTION SUBCUTANEOUS at 20:09

## 2023-06-20 RX ADMIN — PIPERACILLIN AND TAZOBACTAM 4.5 G: 4; .5 INJECTION, POWDER, FOR SOLUTION INTRAVENOUS at 13:29

## 2023-06-20 RX ADMIN — LORAZEPAM 1 MG: 2 INJECTION INTRAMUSCULAR; INTRAVENOUS at 10:30

## 2023-06-20 RX ADMIN — ALUMINUM HYDROXIDE, MAGNESIUM HYDROXIDE, AND SIMETHICONE 15 ML: 200; 200; 20 SUSPENSION ORAL at 14:17

## 2023-06-20 RX ADMIN — AMLODIPINE BESYLATE 5 MG: 5 TABLET ORAL at 14:35

## 2023-06-20 RX ADMIN — SILVER SULFADIAZINE: 10 CREAM TOPICAL at 20:09

## 2023-06-20 RX ADMIN — SODIUM CHLORIDE 1000 ML: 9 INJECTION, SOLUTION INTRAVENOUS at 14:20

## 2023-06-20 RX ADMIN — SODIUM CHLORIDE: 9 INJECTION, SOLUTION INTRAVENOUS at 16:35

## 2023-06-20 ASSESSMENT — ACTIVITIES OF DAILY LIVING (ADL)
ADLS_ACUITY_SCORE: 35
ADLS_ACUITY_SCORE: 37
ADLS_ACUITY_SCORE: 35

## 2023-06-20 NOTE — CONSULTS
[facility]  Gastroenterology Consultation         Scout Perez  [address] [facility] [address]  53 year old male    Admission Date/Time: 6/20/2023  Primary Care Provider: No Ref-Primary, Physician  Referring / Attending Physician:  Dr. Tee    We were asked to see the patient in consultation by Dr. Tee for evaluation of abdominal pain chest pain nausea vomiting.      CC: Nausea vomiting    HPI:  Scout Perez is a 53 year old male who presented to ER brought by EMS due to history of recurrent bouts of significant abdominal pain nausea vomiting patient has history of type 2 diabetes patient was found to be suspected of substance abuse with patient appeared to be diaphoretic dilated pupil dehydrated U tox positive for amphetamine and benzodiazepine which patient is denying patient's EKG appears to be consistent with sinus tachycardia troponins are negative patient is currently getting his echocardiogram done patient has anion gap metabolic acidosis lactic acidosis.  Patient has been started on IV fluids patient is overall starting to feel significantly better patient is denying any further chest pains abdominal pain heartburn.  Patient feels like he is feeling back to normal he would like to be discharged.  Patient work-up in the ER so far include elevated white count of 15,000 hemoglobin of 18 heart rate 130s to 140s CT scan of the abdomen has been negative.  Patient is overall fairly comfortable now denying any other new significant systemic complaints.    ROS: A comprehensive ten point review of systems was negative aside from those in mentioned in the HPI.      PAST MED HX:  I have reviewed this patient's medical history and updated it with pertinent information if needed.   No past medical history on file.    MEDICATIONS: Prior to Admission Medications   Prescriptions Last Dose Informant Patient Reported? Taking?   glipiZIDE (GLUCOTROL) 5 MG tablet 6/19/2023 at pm  No Yes    Sig: Take 1 tablet (5 mg) by mouth 2 times daily (before meals)   silver sulfADIAZINE (SILVADENE) 1 % external cream 6/19/2023  No Yes   Sig: Apply to wound right foot once daily and cover with bandage      Facility-Administered Medications: None       ALLERGIES: No Known Allergies    SOCIAL HISTORY:  Social History     Tobacco Use     Smoking status: Never     Smokeless tobacco: Never   Vaping Use     Vaping status: Never Used   Substance Use Topics     Alcohol use: Not Currently     Drug use: Never       FAMILY HISTORY:  Family History   Problem Relation Age of Onset     Hypertension Mother      Heart Disease Mother      Mental Illness Father      Kidney Disease Father      Diabetes Paternal Grandfather         Type 1     Diabetes Brother      Alcoholism Brother      Diabetes Niece         Type 1     Coronary Artery Disease Early Onset Cousin        PHYSICAL EXAM:   General awake alert responding appropriately.Cardiovascular: negative, PMI normal. No lifts, heaves, or thrills. RRR. No murmurs, clicks gallops or rub  Respiratory: negative, Percussion normal. Good diaphragmatic excursion. Lungs clear  Head: Normocephalic. No masses, lesions, tenderness or abnormalities  Neck: Neck supple. No adenopathy. Thyroid symmetric, normal size,, Carotids without bruits.  Abdomen: Abdomen soft, non-tender. BS normal. No masses, organomegaly  SKIN: no suspicious lesions or rashes  Vital Signs with Ranges  Temp: (P) 98.1  F (36.7  C) Temp src: (P) Oral BP: (!) 147/103 Pulse: 108   Resp: 20 SpO2: 99 % O2 Device: None (Room air)    I/O last 3 completed shifts:  In: -   Out: 600 [Urine:600]              ADDITIONAL COMMENTS:   I reviewed the patient's new clinical lab test results.   Recent Labs   Lab Test 06/20/23 1715 06/20/23 0921 05/26/23  1622   WBC 11.2* 15.3* 8.5   HGB 15.8 18.0* 17.4   MCV 81 81 78    272 225     Recent Labs   Lab Test 06/20/23 1715 06/20/23 0921 05/26/23  1622   POTASSIUM 3.6 4.6 4.0   CHLORIDE  106 97* 99   CO2 21* 16* 19*   BUN 9.3 13.3 11.5   ANIONGAP 11 23* 17*     Recent Labs   Lab Test 06/20/23  1348 06/20/23  0928 06/20/23  0921 05/26/23  1622 08/13/22  1256 07/23/22  1950 07/23/22  1950 02/08/21  1327   ALBUMIN  --   --  4.8 4.9 4.0   < > 4.4  --    BILITOTAL  --   --  0.7 0.6 0.7   < > 1.1  --    ALT  --   --  46 48 49   < > 47  --    AST  --   --  23 33 28   < > 28  --    PROTEIN  --  200*  --   --   --   --   --  Negative   LIPASE 15  --   --   --  97  --  87  --     < > = values in this interval not displayed.       I reviewed the patient's new imaging results.        CONSULTATION ASSESSMENT AND PLAN:    Principal Problem:    Abdominal pain, epigastric    Assessment: Very pleasant 53-year-old gentleman with acute onset of abdominal pain recurrent bouts of chest discomfort nausea vomiting tachycardia hypertension with abnormal urine tox screen patient finding seems to be suggestive of possible metabolic systemic causes contributing to his lactic acidosis and possible dehydration.  At this point I will recommend to continue on supportive care continue on IV fluid IV Protonix possible upper GI endoscopy tomorrow patient is currently getting his echocardiogram done and his cardiac work-up done.  Patient was reassured all of his questions were answered.  Thank you very much for letting me participate in his care.         Mariano Pelayo MD, FACP  [facility].  Office: [phone]  Cell : [phone]      [facility]  Ph: [phone] Fax: [phone]                      53 year old male with history of diabetes mellitus type 2 was brought to the ER by EMS for evaluation of chest and abdominal pain, nausea and vomiting.  substance use. Patient appears diaphoretic, pupils are dilated, very dehydrated. U tox positive for amphetamine and benzo.  Anion gap metabolic acidosis  Lactic acidosis  SIRS    NPO  I/V Protonix  I/V Fluids  Plan for EGD tomorrow    Mariano  JAIDEN Pelayo MD FACP  [facility]

## 2023-06-20 NOTE — PHARMACY-ADMISSION MEDICATION HISTORY
Pharmacy Intern Admission Medication History    Admission medication history is complete. The information provided in this note is only as accurate as the sources available at the time of the update.    Medication reconciliation/reorder completed by provider prior to medication history? No    Information Source(s): Patient and CareEverywhere/SureScripts via in-person    Pertinent Information:   --Could not assess med affordability due to pt chest pain while talking  --Using silvadene cream on right foot daily until Th 6/22 then discontinuing, last application yesterday  --Metformin caused nausea and vomiting in the past    Changes made to PTA medication list:    Added: None    Deleted: None    Changed: None    Medication Affordability:  Not including over the counter (OTC) medications, was there a time in the past 3 months when you did not take your medications as prescribed because of cost?: Unable to Assess    Allergies reviewed with patient and updates made in EHR: yes    Medication History Completed By: JAVIER PALOMO 6/20/2023 11:22 AM    Prior to Admission medications    Medication Sig Last Dose Taking? Auth Provider Long Term End Date   glipiZIDE (GLUCOTROL) 5 MG tablet Take 1 tablet (5 mg) by mouth 2 times daily (before meals) 6/19/2023 at pm Yes Quincy Lyons MD Yes    silver sulfADIAZINE (SILVADENE) 1 % external cream Apply to wound right foot once daily and cover with bandage 6/19/2023 Yes Deacon Tavares DPM

## 2023-06-20 NOTE — ED NOTES
Patient continue to report left sided chest and abdominal pain after receiving Morphine 4 mg IV. Diaphoreses improved but patient continued to feel warm. Declined ice water nor ice pack   Troponin level drawn.   Administered Nitroglycerin 0.4 mg SL x 2 without relief.   BP was above 180 mmHg. Administered Nitroglycerin 10 mg IV. BP improved to 150 mmHg.

## 2023-06-20 NOTE — ED NOTES
"Patient complained of pain on his left chest and abdomen. Noted diaphoresis, tachycardia and increased BP.   Blood pressure (!) 176/111, pulse (!) 140, resp. rate 25, height 1.88 m (6' 2\"), weight 120.2 kg (265 lb), SpO2 96 %.  Administered Morphine IV.   Continued on IV bolus.   Sent patient to CT scan.     "

## 2023-06-20 NOTE — ED NOTES
Bed: ED30  Expected date:   Expected time:   Means of arrival:   Comments:  Davion [age] M chest and abd pain hypertension 180/110 eta [id number]

## 2023-06-20 NOTE — ED PROVIDER NOTES
History     Chief Complaint:  Abdominal Pain       HPI   Scout Perez is a 53 year old male with a history of type 2 diabetes presents with some abdominal stomachache that started around 730 this morning, mostly in his lower abdomen.  He also has clinic complaining of some sharp pain right in the substernal area and the dryness in his throat.  He has had nausea and vomiting x2 but no diarrhea.  No fevers but he feels hot.  He has had no urinary symptoms.  No cardiac history.  He said he just does not feel well.  He still has nausea but denies shortness of breath.  No back pain.  No other associated signs or symptoms.  He denies alcohol and drug use.      Independent Historian:   None - Patient Only    Review of External Notes:   I reviewed his emergency room note from 4/16/2023      Medications:    glipiZIDE (GLUCOTROL) 5 MG tablet  silver sulfADIAZINE (SILVADENE) 1 % external cream        Past Medical History:    No past medical history on file.    Past Surgical History:    No past surgical history on file.     Physical Exam     Patient Vitals for the past 24 hrs:   BP Temp Temp src Pulse Resp SpO2 Height Weight   06/20/23 1525 -- -- -- (!) 124 22 95 % -- --   06/20/23 1524 -- -- -- (!) 123 22 95 % -- --   06/20/23 1523 -- -- -- (!) 123 17 95 % -- --   06/20/23 1522 -- -- -- (!) 122 22 95 % -- --   06/20/23 1521 -- -- -- (!) 123 24 94 % -- --   06/20/23 1520 (!) 167/103 -- -- (!) 123 17 96 % -- --   06/20/23 1500 (!) 181/104 -- -- (!) 128 18 96 % -- --   06/20/23 1434 (!) 164/105 -- -- (!) 130 21 95 % -- --   06/20/23 1419 -- -- -- (!) 125 23 96 % -- --   06/20/23 1416 (!) 188/109 -- -- (!) 128 24 96 % -- --   06/20/23 1404 -- -- -- (!) 133 24 96 % -- --   06/20/23 1402 -- 98.5  F (36.9  C) Oral (!) 129 (!) 33 96 % -- --   06/20/23 1400 (!) 183/110 -- -- (!) 130 17 95 % -- --   06/20/23 1353 -- -- -- (!) 126 20 95 % -- --   06/20/23 1338 (!) 180/117 -- -- (!) 130 26 96 % -- --   06/20/23 1330 (!) 176/108 -- --  "(!) 133 10 96 % -- --   06/20/23 1323 -- -- -- (!) 131 22 96 % -- --   06/20/23 1308 -- -- -- (!) 130 21 95 % -- --   06/20/23 1300 (!) 183/114 -- -- (!) 130 23 95 % -- --   06/20/23 1253 -- -- -- (!) 130 26 95 % -- --   06/20/23 1238 -- -- -- (!) 131 23 96 % -- --   06/20/23 1232 (!) 190/109 -- -- (!) 131 18 95 % -- --   06/20/23 1231 (!) 190/109 -- -- (!) 131 21 96 % -- --   06/20/23 1140 -- -- -- (!) 140 25 96 % -- --   06/20/23 1136 -- -- -- (!) 138 21 96 % -- --   06/20/23 1132 (!) 176/111 -- -- (!) 138 21 97 % -- --   06/20/23 1052 -- -- -- (!) 134 24 97 % -- --   06/20/23 1017 (!) 187/112 -- -- (!) 134 20 97 % -- --   06/20/23 0947 -- -- -- (!) 135 11 98 % -- --   06/20/23 0936 -- -- -- -- -- -- 1.88 m (6' 2\") 120.2 kg (265 lb)   06/20/23 0930 (!) 178/105 -- -- (!) 134 25 97 % -- --   06/20/23 0917 (!) 162/99 -- -- (!) 138 27 98 % -- --        Physical Exam  Nursing note and vitals reviewed.    Constitutional:  Appears very anxious.  HENT:                Nose normal.  No discharge.      Oral mucosa is moist.  He has a skin tag below the right eye on the cheek.  Eyes:    Conjunctivae are normal without injection.  Pupils are equal but dilated and slightly reactive.  Cardiovascular:  Tachycardic, regular rhythm with normal S1 and S2.      Normal heart sounds and peripheral pulses 2+ and equal.       No murmur or david.  Pulmonary:  Effort normal and breath sounds clear to auscultation bilaterally.    No respiratory distress.  No stridor.     No wheezes. No rales.     GI:    Soft. No distension and no mass.  Mild lower tenderness without rebound or guarding.  Musculoskeletal:  Normal range of motion. No extremity deformity.     No edema and no tenderness.    Neurological:   Alert and oriented. No focal weakness.  Skin:    Skin is warm and dry. No rash noted.   Psychiatric:   Behavior is normal.  Very anxious.     Judgment and thought content normal.         Emergency Department Course     ECG results from " 06/20/23   EKG 12-lead, tracing only     Value    Systolic Blood Pressure     Diastolic Blood Pressure     Ventricular Rate 137    Atrial Rate 137    DE Interval 134    QRS Duration 82        QTc 443    P Axis 67    R AXIS 10    T Axis 41    Interpretation ECG      Sinus tachycardia  Otherwise normal ECG  When compared with ECG of 16-APR-2023 16:42,  Vent. rate has increased BY  46 BPM  Confirmed by GENERATED REPORT, COMPUTER ([id number]),  Diane Arciniega ([id number]) on 6/20/2023 9:27:19 AM       I reviewed this ECG and agree with the interpretation.    Imaging:  CT Aortic Survey w Contrast   Final Result   IMPRESSION:    1. No evidence for thoracoabdominal aortic aneurysm or dissection.   2. Hepatic steatosis.   3. Splenomegaly.   4. Mid and distal esophageal wall thickening, of indeterminate   etiology, could be seen with esophagitis.      ANGLE MADRID MD            SYSTEM ID:  [id number]         Report per radiology    Laboratory:  Labs Ordered and Resulted from Time of ED Arrival to Time of ED Departure   ROUTINE UA WITH MICROSCOPIC REFLEX TO CULTURE - Abnormal       Result Value    Color Urine Light Yellow      Appearance Urine Clear      Glucose Urine >=1000 (*)     Bilirubin Urine Negative      Ketones Urine 40 (*)     Specific Gravity Urine 1.033      Blood Urine Small (*)     pH Urine 5.5      Protein Albumin Urine 200 (*)     Urobilinogen Urine Normal      Nitrite Urine Negative      Leukocyte Esterase Urine Negative      Mucus Urine Present (*)     RBC Urine 1      WBC Urine 1      Squamous Epithelials Urine <1      Granular Casts Urine 13 (*)    BASIC METABOLIC PANEL - Abnormal    Sodium 136      Potassium 4.6      Chloride 97 (*)     Carbon Dioxide (CO2) 16 (*)     Anion Gap 23 (*)     Urea Nitrogen 13.3      Creatinine 0.75      Calcium 9.8      Glucose 461 (*)     GFR Estimate >90     CBC WITH PLATELETS AND DIFFERENTIAL - Abnormal    WBC Count 15.3 (*)     RBC Count 6.22 (*)     Hemoglobin 18.0 (*)      Hematocrit 50.1      MCV 81      MCH 28.9      MCHC 35.9      RDW 12.7      Platelet Count 272      % Neutrophils 89      % Lymphocytes 6      % Monocytes 4      % Eosinophils 0      % Basophils 0      % Immature Granulocytes 1      NRBCs per 100 WBC 0      Absolute Neutrophils 13.7 (*)     Absolute Lymphocytes 0.9      Absolute Monocytes 0.6      Absolute Eosinophils 0.0      Absolute Basophils 0.0      Absolute Immature Granulocytes 0.1      Absolute NRBCs 0.0     DRUG ABUSE SCREEN 77 URINE (FL, RH, SH) - Abnormal    Amphetamines Urine Screen Positive (*)     Barbituates Urine Screen Negative      Benzodiazepine Urine Screen Positive (*)     Cannabinoids Urine Screen Negative      Opiates Urine Screen Negative      PCP Urine Screen Negative      Cocaine Urine Screen Negative     PROCALCITONIN - Abnormal    Procalcitonin 0.10 (*)    ISTAT GASES LACTATE VENOUS POCT - Abnormal    Lactic Acid POCT 4.6 (*)     Bicarbonate Venous POCT 20 (*)     O2 Sat, Venous POCT 89 (*)     pCO2 Venous POCT 34 (*)     pH Venous POCT 7.39      pO2 Venous POCT 56 (*)    GLUCOSE BY METER - Abnormal    GLUCOSE BY METER POCT 319 (*)    ISTAT GASES LACTATE VENOUS POCT - Abnormal    Lactic Acid POCT 3.5 (*)     Bicarbonate Venous POCT 21      O2 Sat, Venous POCT 90 (*)     pCO2 Venous POCT 34 (*)     pH Venous POCT 7.39      pO2 Venous POCT 58 (*)    ETHYL ALCOHOL LEVEL - Normal    Alcohol ethyl <0.01     TROPONIN T, HIGH SENSITIVITY - Normal    Troponin T, High Sensitivity 18     HEPATIC FUNCTION PANEL - Normal    Protein Total 8.0      Albumin 4.8      Bilirubin Total 0.7      Alkaline Phosphatase 84      AST 23      ALT 46      Bilirubin Direct <0.20     NT PROBNP INPATIENT - Normal    N terminal Pro BNP Inpatient 313     TSH WITH FREE T4 REFLEX - Normal    TSH 2.40     TROPONIN T, HIGH SENSITIVITY - Normal    Troponin T, High Sensitivity 19     LIPASE - Normal    Lipase 15     GLUCOSE MONITOR NURSING POCT   LACTIC ACID WHOLE BLOOD    INFLUENZA A/B, RSV, & SARS-COV2 PCR   LACTIC ACID WHOLE BLOOD   BLOOD CULTURE   BLOOD CULTURE            Emergency Department Course & Assessments:             Interventions:  Medications   0.9% sodium chloride BOLUS (0 mLs Intravenous Stopped 6/20/23 1213)     Followed by   sodium chloride 0.9% infusion ( Intravenous Rate/Dose Verify 6/20/23 1524)   ondansetron (ZOFRAN) injection 4 mg (4 mg Intravenous $Given 6/20/23 1017)   nitroGLYcerin (NITROSTAT) sublingual tablet 0.4 mg (0.4 mg Sublingual $Given 6/20/23 1519)   hydrALAZINE (APRESOLINE) injection 10 mg ( Intravenous Canceled Entry 6/20/23 1521)   LORazepam (ATIVAN) injection 1 mg (1 mg Intravenous $Given 6/20/23 1030)   0.9% sodium chloride BOLUS (0 mLs Intravenous Stopped 6/20/23 1327)   glipiZIDE (GLUCOTROL) tablet 5 mg (5 mg Oral $Given 6/20/23 1129)   morphine (PF) injection 4 mg (4 mg Intravenous $Given 6/20/23 1130)   Saline flush (80 mLs Intravenous $Given 6/20/23 1147)   iopamidol (ISOVUE-370) solution 80 mL (150 mLs Intravenous $Given 6/20/23 1148)   piperacillin-tazobactam (ZOSYN) 4.5 g vial to attach to  mL bag (0 g Intravenous Stopped 6/20/23 1417)   alum & mag hydroxide-simethicone (MAALOX) suspension 15 mL (15 mLs Oral $Given 6/20/23 1417)   amLODIPine (NORVASC) tablet 5 mg (5 mg Oral $Given 6/20/23 1435)   0.9% sodium chloride BOLUS (1,000 mLs Intravenous $New Bag 6/20/23 1420)   LORazepam (ATIVAN) injection 1 mg (1 mg Intravenous $Given 6/20/23 1434)        Assessments:  1005    Independent Interpretation (X-rays, CTs, rhythm strip):  None    Consultations/Discussion of Management or Tests:  Dr López        Social Determinants of Health affecting care:   None    Disposition:  The patient was admitted to the hospital under the care of Dr. López.     Impression & Plan    CMS Diagnoses: The Lactic acid level is elevated due to Meth abuse and withdrawal and dehydration from Hyperglycemia, at this time there is no sign of severe sepsis or  septic shock. and None      Medical Decision Making:  Patient comes in not feeling well with abdominal and chest pain.  Labs are obtained and he was given IV fluids.  He at first said that he was exposed to methamphetamine through his brother and his test came back positive.  His pupils are dilated, he is tachycardic.  IV fluids have not brought his pulse rate down below 120 at despite 2 L and he is on his third liter.  His procalcitonin is mildly elevated 0.10 glucose was 461, renal function is normal electrolytes look good other than his bicarb is low at 16 with an increased anion gap at 23.  LFTs are normal.  Troponin is normal and his TSH is normal.  BNP is normal.  Repeat troponin is unchanged.  His lactate came back high at 4.6 and because of the elevated white count of 15.3 as well, blood cultures were obtained and he was given a dose of Zosyn although my suspicion for infection is low.  He has not been sick recently.  But because of the chest pain abdominal pain I got a aorta survey and it came back showing possible distal esophagitis but otherwise normal.  No diverticulitis or bowel obstruction.  No indirect evidence of a PE and my suspicion for PE is low.  Drug screen is positive for amphetamines and benzodiazepine he received Versed in the ambulance.  The more I looked at him with his dilated pupils tachycardia I believe this is withdrawal.  Ativan was given IV and will continue to give IV fluids.  I talked with the hospitalist Dr. James who will be admitting him and he agrees and will accept the patient  We will continue to follow his lactic acid and will hold on antibiotics unless his cultures come back positive.  His urine was unremarkable.  With his elevated blood sugar now still over 300 with IV fluids, he will need probably some insulin.  I did give him his oral glipizide dose that he takes twice a day when he got here.  Patient will be admitted to [facility] because of his withdrawal along with his  elevated lactate and renal failure.        Diagnosis:    ICD-10-CM    1. Chest pain, unspecified type  R07.9       2. Abdominal pain, epigastric  R10.13       3. Sinus tachycardia  R00.0       4. Elevated lactic acid level  R79.89       5. Anxiety  F41.9       6. Diabetes mellitus due to underlying condition with hyperglycemia, without long-term current use of insulin (H)  E08.65       7. Methamphetamine abuse (H)  F15.10            Discharge Medications:  New Prescriptions    No medications on file          Sallie Ramirez MD  6/20/2023   Sallie Ramirez MD Powell, Tracy Alan, MD  06/20/23 [id number]

## 2023-06-20 NOTE — ED TRIAGE NOTES
Pt had abd pain that started around 730 am today  Pt has lower abd pain that radiates up into his abd   bgl by ems was 330      Ems gave 2.5 mg of versed and 324 mg asa    1 liter bolus normal saline

## 2023-06-20 NOTE — H&P
[facility]    History and Physical - Hospitalist Service       Date of Admission:  6/20/2023    Assessment & Plan      Scout Perez is a 53 year old male with history of diabetes mellitus type 2 was brought to the ER by EMS for evaluation of chest and abdominal pain, nausea and vomiting and is being admitted on 6/20/2023 for further management.     Chest pain  Hypertensive urgency  Sinus tachycardia  Suspect related to substance use. Patient appears diaphoretic, pupils are dilated, very dehydrated. U tox positive for amphetamine and benzo.  Patient denies substance use by himself though.   *Troponin negative x2.  EKG shows sinus tachycardia and no obvious ischemic changes.   * CT aortic survey negative -Telemetry  -admit to C  -Aggressive IV hydration  -Nitroglycerin sublingual as needed   -2D echo.   -Will start him on calcium channel blocker.  Hydralazine as needed. Lorazepam PRN.  -TSH normal    Anion gap metabolic acidosis  Lactic acidosis  SIRS  Suspect related to dehydration, nausea and vomiting.  Leukocytosis is likely from hemoconcentration/stress.  No obvious sign of infection.  Received Zosyn in ER and blood culture sent.  -Aggressive IV hydration  -I will continue with Zosyn but if no fever, blood cultures negative for next 24 hours, will discontinue antibiotic and monitor.  -Lactic trended down, follow. Repeat BMP    Possible esophagitis  Abd pain nausea and vomiting  Noted on CT. Likely cause or result of nausea/vomiting.   - PPI  - GI consult to r/o any malignancy or candida esophagitis. Needs EGD when stable.   -check lipase    Drug screen positive for amphetamine and benzodiazepine  Patient adamantly denies use of illicit drugs. States his brother uses amphetamine when he is around, and also others at work use it a lot. Pupils are markedly dilated.   -Lorazepam PRN  -IVF    Dehydration/hemoconcentration   Suspect due to no p.o. intake today, also possibly due to hyper  "osmotic diuresis from uncontrolled diabetes.  -Received 3L NS so far including EMS and ER, 1L bolus ordered  -Maintenance NS at 150 cc/hour  -Follow urine output    DM 2, uncontrolled  HbA1c 10.6 a month ago.  Takes glipizide 5 Mg twice daily.  Blood sugars 400s at presentation  -Resume PTA glipizide with increase dose once p.o. started.  Got 1 dose in ER, will keep n.p.o. after midnight for possible EGD tomorrow  -NovoLog Premeal per carb count while in hospital  -Correctional dose insulin  -Moderate consistent carbohydrate diet       Diet: Moderate Consistent Carb (60 g CHO per Meal) Diet    DVT Prophylaxis: Pneumatic Compression Devices  Choudhury Catheter: Not present  Lines: None     Cardiac Monitoring: None  Code Status:  Full code    Clinically Significant Risk Factors Present on Admission                # Obesity: Estimated body mass index is 34.02 kg/m  as calculated from the following:    Height as of this encounter: 1.88 m (6' 2\").    Weight as of this encounter: 120.2 kg (265 lb).            Disposition Plan            Randal López MD  Hospitalist Service  [facility]  Securely message with [email or web address] (more info)  Text page via [employer] Paging/Directory     ______________________________________________________________________    Chief Complaint   Chest pain  Abdominal pain, nausea vomiting    History is obtained from the patient, chart review, discussion with ER MD    History of Present Illness     Scout Perez is a 53 year old male with history of diabetes mellitus type 2 was brought to the ER by EMS for evaluation of chest and abdominal pain, nausea and vomiting    Patient states he woke up this morning and started having chest pain around 7:30 AM.  Left side, 7-8/10, nonradiating, waxing and waning pain.  Also started having abdominal pain generalized although more on epigastric area.  Associated with nausea and vomited x2, nonbloody, no coffee-ground emesis.  Reports dizziness " "but denies fever, cough, palpitation.  Chest pain had relieved after getting aspirin and morphine but states it is coming back again during evaluation.    Patient denies recreational drug use. When discussed about positive drug screen for amphetamine and benzo, states he is around his brother who uses amphetamine all the time. Also states others at work use amphetamine. Patient became upset and also frustrated saying \"everyone asks me the same question and thinks I do drug\".     In ER, patient was evaluated by Dr. Ramirez.  Patient was hypertensive, SBPs in 170-180, DBP's 100-110, heart rate 130s-140.  Labs showed cytosis of 15.3 and hemoglobin 18.  BMP showed anion gap metabolic acidosis, LFTs normal, troponin negative.  Lactic acid was elevated at 4.6.  CT thoracoabdominal aortic survey negative for aortic dissection or other acute abdominal/pulmonary process.  Mild distal esophageal wall thickening was noted.  Patient received 2L IV NS bolus, IV morphine, Zofran lorazepam.  He was given IV morphine and 324 mg aspirin by EMS in route.  Repeat lactic acid 3.5 troponin remains negative.       Past Medical History    -DM2    Past Surgical History   No past surgical history on file.    Prior to Admission Medications   Prior to Admission Medications   Prescriptions Last Dose Informant Patient Reported? Taking?   glipiZIDE (GLUCOTROL) 5 MG tablet 6/19/2023 at pm  No Yes   Sig: Take 1 tablet (5 mg) by mouth 2 times daily (before meals)   silver sulfADIAZINE (SILVADENE) 1 % external cream 6/19/2023  No Yes   Sig: Apply to wound right foot once daily and cover with bandage      Facility-Administered Medications: None        Review of Systems    The 10 point Review of Systems is negative other than noted in the HPI or here.      Social History   I have reviewed this patient's social history and updated it with pertinent information if needed.  Social History     Tobacco Use     Smoking status: Never     Smokeless tobacco: " Never   Vaping Use     Vaping status: Never Used   Substance Use Topics     Alcohol use: Not Currently     Drug use: Never       Family History   I have reviewed this patient's family history and updated it with pertinent information if needed.  Family History   Problem Relation Age of Onset     Hypertension Mother      Heart Disease Mother      Mental Illness Father      Kidney Disease Father      Diabetes Paternal Grandfather         Type 1     Diabetes Brother      Alcoholism Brother      Diabetes Niece         Type 1     Coronary Artery Disease Early Onset Cousin        Allergies   No Known Allergies     Physical Exam   Vital Signs:     BP: (!) 176/111 Pulse: (!) 140   Resp: 25 SpO2: 96 %      Weight: 265 lbs 0 oz    General: AAOx3, diaphoretic, somewhat restless but not in distress.  HEENT: Face is plethoric, Pupils are dilated to 7-8 mm, reactive to light, EOMI. Mucosa dry  Lungs: Bilateral equal air entry. Clear to auscultation, normal work of breathing.   CVS: S1S2 regular, tachycardic, no murmur.   Abdomen: Soft, NT, ND. BS heard.  MSK: No edema or deformities.  Neuro: AAOX3. CN 2-12 normal. Strength symmetrical.  Skin: No rash.       Medical Decision Making       75 MINUTES SPENT BY ME on the date of service doing chart review, history, exam, documentation & further activities per the note.      Data     I have personally reviewed the following data over the past 24 hrs:    15.3 (H)  \   18.0 (H)   / 272     136 97 (L) 13.3 /  319 (H)   4.6 16 (L) 0.75 \       ALT: 46 AST: 23 AP: 84 TBILI: 0.7   ALB: 4.8 TOT PROTEIN: 8.0 LIPASE: N/A       Trop: 19 BNP: 313       TSH: 2.40 T4: N/A A1C: N/A       Procal: 0.10 (H) CRP: N/A Lactic Acid: 3.5 (H)         Imaging results reviewed over the past 24 hrs:   Recent Results (from the past 24 hour(s))   CT Aortic Survey w Contrast    Narrative    CT AORTIC SURVEY WITH CONTRAST June 20, 2023 12:36 PM    HISTORY: Abdominal pain with elevated white count and  tachycardia.  Also has chest pain.    TECHNIQUE: CT scan obtained of the chest, abdomen, and pelvis with IV  contrast. Post contrast scanning performed during the arterial phase.  CT chest also obtained without IV contrast. 150mL [id number]-[id number] IV  injected. Sagittal and coronal reformatted images acquired from the  source post contrast data. Radiation dose for this scan was reduced  using automated exposure control, adjustment of the mA and/or kV  according to patient size, or iterative reconstruction technique.    COMPARISON: Chest x-ray on 8/13/2022.    FINDINGS:  Thoracic and abdominal aorta: No evidence of thoracolumbar abdominal  aortic aneurysm or dissection. Mild atherosclerotic vascular  calcification of the thoracoabdominal aorta.    Other vascular: The major branches of the proximal abdominal aorta are  patent.    Chest/mediastinum: No cardiomegaly or significant pericardial  effusion. No significant mediastinal, hilar or axillary  lymphadenopathy. Moderate atherosclerotic vascular calcification of  the coronary arteries. Mid and distal esophageal wall thickening, of  indeterminate etiology.    Lung/pleura: No pleural effusion or pneumothorax. Mild basilar  pulmonary opacities, likely atelectasis.    Abdomen/pelvis:    Hepatobiliary: Diffuse hyperdense appearance of the liver, likely to  underlying hepatic steatosis. The gallbladder is unremarkable.    Pancreas: No main pancreatic ductal dilatation or definite solid  pancreatic mass.    Spleen: The spleen is mildly enlarged measuring 13.1 cm in  craniocaudal dimension.    Adrenal glands: No adrenal nodules.    Kidneys: No radiodense kidney/ureteral stones or hydronephrosis in the  kidney.    Bowel: No abnormally dilated bowel loops.    Peritoneum: No significant free fluid in the abdomen or pelvis. No  free peritoneal portal venous gas.    Pelvic organs: Unremarkable.    Lymph nodes: No significant abdominopelvic lymphadenopathy.    Bones and soft tissue: No  suspicious osseous lesion. Small  fat-containing left inguinal hernia.      Impression    IMPRESSION:   1. No evidence for thoracoabdominal aortic aneurysm or dissection.  2. Hepatic steatosis.  3. Splenomegaly.  4. Mid and distal esophageal wall thickening, of indeterminate  etiology, could be seen with esophagitis.    ANGLE MADRID MD         SYSTEM ID:  [id number]

## 2023-06-21 VITALS
OXYGEN SATURATION: 100 % | HEART RATE: 90 BPM | SYSTOLIC BLOOD PRESSURE: 162 MMHG | TEMPERATURE: 98.3 F | WEIGHT: 265 LBS | RESPIRATION RATE: 39 BRPM | BODY MASS INDEX: 34.01 KG/M2 | HEIGHT: 74 IN | DIASTOLIC BLOOD PRESSURE: 92 MMHG

## 2023-06-21 PROBLEM — F15.10 METHAMPHETAMINE ABUSE (H): Status: ACTIVE | Noted: 2023-06-21

## 2023-06-21 PROBLEM — R00.0 SINUS TACHYCARDIA: Status: ACTIVE | Noted: 2023-06-21

## 2023-06-21 PROBLEM — R79.89 ELEVATED LACTIC ACID LEVEL: Status: ACTIVE | Noted: 2023-06-21

## 2023-06-21 PROBLEM — E08.65 DIABETES MELLITUS DUE TO UNDERLYING CONDITION WITH HYPERGLYCEMIA, WITHOUT LONG-TERM CURRENT USE OF INSULIN (H): Status: ACTIVE | Noted: 2023-06-21

## 2023-06-21 PROBLEM — F41.9 ANXIETY: Status: ACTIVE | Noted: 2022-12-22

## 2023-06-21 LAB
ALBUMIN SERPL BCG-MCNC: 3.8 G/DL (ref 3.5–5.2)
ALP SERPL-CCNC: 68 U/L (ref 40–129)
ALT SERPL W P-5'-P-CCNC: 33 U/L (ref 0–70)
ANION GAP SERPL CALCULATED.3IONS-SCNC: 11 MMOL/L (ref 7–15)
AST SERPL W P-5'-P-CCNC: 20 U/L (ref 0–45)
BASOPHILS # BLD AUTO: 0.1 10E3/UL (ref 0–0.2)
BASOPHILS NFR BLD AUTO: 1 %
BILIRUB SERPL-MCNC: 1 MG/DL
BUN SERPL-MCNC: 8 MG/DL (ref 6–20)
CALCIUM SERPL-MCNC: 8.6 MG/DL (ref 8.6–10)
CHLORIDE SERPL-SCNC: 105 MMOL/L (ref 98–107)
CREAT SERPL-MCNC: 0.61 MG/DL (ref 0.67–1.17)
DEPRECATED HCO3 PLAS-SCNC: 21 MMOL/L (ref 22–29)
EOSINOPHIL # BLD AUTO: 0.1 10E3/UL (ref 0–0.7)
EOSINOPHIL NFR BLD AUTO: 1 %
ERYTHROCYTE [DISTWIDTH] IN BLOOD BY AUTOMATED COUNT: 12.8 % (ref 10–15)
GFR SERPL CREATININE-BSD FRML MDRD: >90 ML/MIN/1.73M2
GLUCOSE BLDC GLUCOMTR-MCNC: 261 MG/DL (ref 70–99)
GLUCOSE SERPL-MCNC: 254 MG/DL (ref 70–99)
HCT VFR BLD AUTO: 45 % (ref 40–53)
HGB BLD-MCNC: 15.9 G/DL (ref 13.3–17.7)
IMM GRANULOCYTES # BLD: 0 10E3/UL
IMM GRANULOCYTES NFR BLD: 0 %
LACTATE SERPL-SCNC: 2 MMOL/L (ref 0.7–2)
LACTATE SERPL-SCNC: 2.6 MMOL/L (ref 0.7–2)
LYMPHOCYTES # BLD AUTO: 2.3 10E3/UL (ref 0.8–5.3)
LYMPHOCYTES NFR BLD AUTO: 27 %
MCH RBC QN AUTO: 29.5 PG (ref 26.5–33)
MCHC RBC AUTO-ENTMCNC: 35.3 G/DL (ref 31.5–36.5)
MCV RBC AUTO: 84 FL (ref 78–100)
MONOCYTES # BLD AUTO: 0.5 10E3/UL (ref 0–1.3)
MONOCYTES NFR BLD AUTO: 6 %
NEUTROPHILS # BLD AUTO: 5.4 10E3/UL (ref 1.6–8.3)
NEUTROPHILS NFR BLD AUTO: 65 %
NRBC # BLD AUTO: 0 10E3/UL
NRBC BLD AUTO-RTO: 0 /100
PLATELET # BLD AUTO: 218 10E3/UL (ref 150–450)
POTASSIUM SERPL-SCNC: 4.1 MMOL/L (ref 3.4–5.3)
PROT SERPL-MCNC: 6.5 G/DL (ref 6.4–8.3)
RBC # BLD AUTO: 5.39 10E6/UL (ref 4.4–5.9)
SODIUM SERPL-SCNC: 137 MMOL/L (ref 136–145)
WBC # BLD AUTO: 8.4 10E3/UL (ref 4–11)

## 2023-06-21 PROCEDURE — 36415 COLL VENOUS BLD VENIPUNCTURE: CPT | Performed by: HOSPITALIST

## 2023-06-21 PROCEDURE — 36415 COLL VENOUS BLD VENIPUNCTURE: CPT | Performed by: INTERNAL MEDICINE

## 2023-06-21 PROCEDURE — 258N000003 HC RX IP 258 OP 636: Performed by: INTERNAL MEDICINE

## 2023-06-21 PROCEDURE — 258N000003 HC RX IP 258 OP 636: Performed by: HOSPITALIST

## 2023-06-21 PROCEDURE — C9113 INJ PANTOPRAZOLE SODIUM, VIA: HCPCS | Mod: JZ | Performed by: HOSPITALIST

## 2023-06-21 PROCEDURE — 83605 ASSAY OF LACTIC ACID: CPT | Performed by: HOSPITALIST

## 2023-06-21 PROCEDURE — 99239 HOSP IP/OBS DSCHRG MGMT >30: CPT | Performed by: INTERNAL MEDICINE

## 2023-06-21 PROCEDURE — 85025 COMPLETE CBC W/AUTO DIFF WBC: CPT | Performed by: HOSPITALIST

## 2023-06-21 PROCEDURE — 80053 COMPREHEN METABOLIC PANEL: CPT | Performed by: HOSPITALIST

## 2023-06-21 PROCEDURE — 83605 ASSAY OF LACTIC ACID: CPT | Performed by: INTERNAL MEDICINE

## 2023-06-21 PROCEDURE — 250N000011 HC RX IP 250 OP 636: Mod: JZ | Performed by: HOSPITALIST

## 2023-06-21 RX ORDER — NALOXONE HYDROCHLORIDE 0.4 MG/ML
0.2 INJECTION, SOLUTION INTRAMUSCULAR; INTRAVENOUS; SUBCUTANEOUS
Status: DISCONTINUED | OUTPATIENT
Start: 2023-06-21 | End: 2023-06-21 | Stop reason: HOSPADM

## 2023-06-21 RX ORDER — SODIUM CHLORIDE, SODIUM LACTATE, POTASSIUM CHLORIDE, CALCIUM CHLORIDE 600; 310; 30; 20 MG/100ML; MG/100ML; MG/100ML; MG/100ML
INJECTION, SOLUTION INTRAVENOUS CONTINUOUS
Status: DISCONTINUED | OUTPATIENT
Start: 2023-06-21 | End: 2023-06-21 | Stop reason: HOSPADM

## 2023-06-21 RX ORDER — NALOXONE HYDROCHLORIDE 0.4 MG/ML
0.4 INJECTION, SOLUTION INTRAMUSCULAR; INTRAVENOUS; SUBCUTANEOUS
Status: DISCONTINUED | OUTPATIENT
Start: 2023-06-21 | End: 2023-06-21 | Stop reason: HOSPADM

## 2023-06-21 RX ADMIN — PIPERACILLIN AND TAZOBACTAM 4.5 G: 4; .5 INJECTION, POWDER, FOR SOLUTION INTRAVENOUS at 09:09

## 2023-06-21 RX ADMIN — PIPERACILLIN AND TAZOBACTAM 4.5 G: 4; .5 INJECTION, POWDER, FOR SOLUTION INTRAVENOUS at 01:51

## 2023-06-21 RX ADMIN — SODIUM CHLORIDE: 9 INJECTION, SOLUTION INTRAVENOUS at 05:36

## 2023-06-21 RX ADMIN — PANTOPRAZOLE SODIUM 40 MG: 40 INJECTION, POWDER, FOR SOLUTION INTRAVENOUS at 09:18

## 2023-06-21 RX ADMIN — SODIUM CHLORIDE, POTASSIUM CHLORIDE, SODIUM LACTATE AND CALCIUM CHLORIDE: 600; 310; 30; 20 INJECTION, SOLUTION INTRAVENOUS at 09:25

## 2023-06-21 ASSESSMENT — ACTIVITIES OF DAILY LIVING (ADL)
ADLS_ACUITY_SCORE: 37

## 2023-06-21 NOTE — DISCHARGE SUMMARY
"[facility]    Discharge Summary  Hospitalist    Date of Admission:  6/20/2023  Date of Discharge:  6/21/2023 12:21 PM  Discharging Provider: Ramakrishna Huff MD, MD    Discharge Diagnoses   Anion gap metabolic acidosis and lactic acidosis secondary to dehydration nausea and vomiting.    SIRS  Nausea and vomiting.  GERD  GI will abnormality noted on CT declines EGD  Hypertensive urgency resolved  Chest pain-likely noncardiac  Uncontrolled type 2 diabetes  Drug screen positive for amphetamines and benzodiazepines    History of Present Illness   Scout Perez is a 53 year old male with history of diabetes mellitus type 2 was brought to the ER by EMS for evaluation of chest and abdominal pain, nausea and vomiting     Patient states he woke up this morning and started having chest pain around 7:30 AM.  Left side, 7-8/10, nonradiating, waxing and waning pain.  Also started having abdominal pain generalized although more on epigastric area.  Associated with nausea and vomited x2, nonbloody, no coffee-ground emesis.  Reports dizziness but denies fever, cough, palpitation.  Chest pain had relieved after getting aspirin and morphine but states it is coming back again during evaluation.     Patient denies recreational drug use. When discussed about positive drug screen for amphetamine and benzo, states he is around his brother who uses amphetamine all the time. Also states others at work use amphetamine. Patient became upset and also frustrated saying \"everyone asks me the same question and thinks I do drug\".      In ER, patient was evaluated by Dr. Ramirez.  Patient was hypertensive, SBPs in 170-180, DBP's 100-110, heart rate 130s-140.  Labs showed cytosis of 15.3 and hemoglobin 18.  BMP showed anion gap metabolic acidosis, LFTs normal, troponin negative.  Lactic acid was elevated at 4.6.  CT thoracoabdominal aortic survey negative for aortic dissection or other acute abdominal/pulmonary process.  " Mild distal esophageal wall thickening was noted.  Patient received 2L IV NS bolus, IV morphine, Zofran lorazepam.  He was given IV morphine and 324 mg aspirin by EMS in route.  Repeat lactic acid 3.5 troponin remains negative.           Hospital Course   Scout Perez was admitted on 6/20/2023.  The following problems were addressed during his hospitalization:    Principal Problem:    Abdominal pain, epigastric  Active Problems:    Chest pain, unspecified type    Date of Admission:  6/20/2023        Assessment & Plan  Scout Perez is a 53 year old male with history of diabetes mellitus type 2 was brought to the ER by EMS for evaluation of chest and abdominal pain, nausea and vomiting and is being admitted on 6/20/2023 for further management.      Chest pain  Hypertensive urgency  Sinus tachycardia  Suspect related to substance use. Patient appears diaphoretic, pupils are dilated, very dehydrated. U tox positive for amphetamine and benzo.  Patient denies substance use by himself though.   *Troponin negative x2.  EKG shows sinus tachycardia and no obvious ischemic changes.   * CT aortic survey negative -Telemetry  [place] [facility]  He received aggressive IV hydration.  Echocardiogram was unremarkable showing wall motion abnormalities.  He had a normal ejection fraction EF 55 to 60%.  No significant valve disease.  -She had resolution of his chest pain prior to arrival in the emergency room.  Recurrence in the hospital.  -TSH was normal.  He was initiated on calcium channel blocker  -Serial troponins within normal limits.  TSH normal.  -Patient remained without moderate hypertension 150s to 160s over 90 range.  Afebrile and pulse in the 90s with normal oxygen saturations.  -Patient declined any new medications.  -He will follow-up with his primary MD as scheduled.  He declined having care coordinator schedule a follow-up next week with PCP or partner to follow-up multiple acute medical issues  -Patient  declined cardiac stress testing inpatient or outpatient.  Unlikely cardiac etiology to his muscle pain but he described a substernal sensation and down both arms which resolved prior to coming into the hospital.  He denies any exertional pain since then.  I suspect myofascial but prudent to pursue cardiac stress testing.     Anion gap metabolic acidosis  Lactic acidosis  SIRS  Suspect related to dehydration, nausea and vomiting.  Leukocytosis is likely from hemoconcentration/stress.  No obvious sign of infection.  Received Zosyn in ER and blood culture sent.  -Patient did well overnight with aggressive hydration.  His acidosis lactic acidosis and SIRS resolved.  Patient was feeling well and without focal complaints.  No focal infectious complaints.  No chest pain nausea vomiting or diarrhea.  No abdominal pain or UTI symptoms.  No cough.  His lactic acid level decreased to 2.0.  Normal BMP LFTs on day of discharge.  White blood cell count normalized to 8.4.  Admission white blood cell count 15.3.  Normal differential on follow-up.  Influenza RSV and COVID PCR all negative.  blood cultures no growth to date       Possible esophagitis  Abd pain nausea and vomiting  Noted on CT. Likely cause or result of nausea/vomiting.   -Patient states that he occasionally has heartburn and does take Tums.  Denies NSAIDs.  Denies alcohol use.  -Denies of dysphagia or diet aphasia.  He does take Tums regularly however.  -Because he was feeling better patient declined upper endoscopy.  He was seen by [facility] gastroenterology.  They will call the patient for outpatient follow-up given he declined upper endoscopy in the hospital.  -He declined Protonix or other proton pump inhibitor as he prefers Tums.  Discussed reasoning behind proton pump inhibitor to decrease acid secretion in and help heal any potential lesion in the stomach or distal esophagus.  He will consider this.  Recommend that he could get PPI with his primary care provider  or obtain Prilosec over-the-counter.  He is to take it on empty stomach.  -Neurology will contact the patient in about approximately a week for follow-up clinic visit.  -Liver function tests are normal on follow-up.  White blood cell count normalized.  Benign abdominal exam.  Patient tolerating diet.  Patient was anxious for discharge.  -Lactic acidosis resolved.  -Discussed differential diagnosis of his distal esophageal lesion noted on CT scan.  This could resent represent inflammation possible underlying malignancy.  Patient does note ongoing weight loss which she has not had evaluated.  Strongly recommended upper endoscopy and PPI and close follow-up with PCP and gastroenterology.      Drug screen positive for amphetamine and benzodiazepine  Patient adamantly denies use of illicit drugs. States his brother uses amphetamine when he is around, and also others at work use it a lot. Pupils are markedly dilated.   -Patient states that his brother use methamphetamine and it was over at his house and within the last week.  Surrounded by people at his brother's house and at work who smoked methamphetamine.  He states he may have had inhaled it secondarily and that would explain his positive U tox.  Patient denies smoking or using methamphetamine.  He denies benzodiazepine use.  Denies alcohol use.   -Follow-up with PCP.     Dehydration/hemoconcentration -resolved  Suspect due to no p.o. intake today, also possibly due to hyper osmotic diuresis from uncontrolled diabetes.  -Resolved with 3 L nasal normal saline bolus in the emergency room and maintenance fluids high rate overnight.  Patient felt well.  BMP lactic acidosis and white blood cell count all normalized.  Afebrile tolerating p.o. intake.  Patient anxious for discharge.     DM 2, uncontrolled  HbA1c 10.6 a month ago.  Takes glipizide 5 Mg twice daily.  Blood sugars 400s at presentation  -Resume PTA glipizide with increase dose once p.o. started.  Got 1 dose in  "ER, will keep n.p.o. after midnight for possible EGD tomorrow  -Patient treated with diabetic diet and then n.p.o. for possible upper endoscopy which she ultimately declined.  He was treated with insulin sliding scale.  Sugars down into the 200 range.  -Discussed diabetic diet and encouraged him to reduce his carbohydrates.  Declined nutrition consult.  -Patient declined further treatment for his diabetes including increasing his glipizide or additional medications.  Declined close follow-up with primary care clinic next week and wishes to continue with his usual scheduled primary care provider follow-up.  Diet: Moderate Consistent Carb (60 g CHO per Meal) Diet -  discussed in detail with patient  DVT Prophylaxis: Pneumatic Compression Devices  Choudhury Catheter: Not present  Lines: None     Cardiac Monitoring: None  Code Status:  Full code        Clinically Significant Risk Factors Present on Admission                # Obesity: Estimated body mass index is 34.02 kg/m  as calculated from the following:    Height as of this encounter: 1.88 m (6' 2\").    Weight as of this encounter: 120.2 kg (265 lb).                   Disposition Plan- discharge home.   Pt has follow up appt with pcp scheduled. He declined assistance to have our team schedule f/u appt with primary clinic next week           Ramakrishna Huff MD, MD    Significant Results and Procedures   See hospitalist course    Pending Results   These results will be followed up by hospitalist  Unresulted Labs Ordered in the Past 30 Days of this Admission     Date and Time Order Name Status Description    6/20/2023 12:35 PM Blood Culture Peripheral Blood Preliminary     6/20/2023 12:35 PM Blood Culture Peripheral Blood Preliminary           Code Status   Full Code       Primary Care Physician   Physician No Ref-Primary    Physical Exam   Temp: 98.3  F (36.8  C) Temp src: Oral BP: (!) 162/92 Pulse: 90   Resp: (!) 39 SpO2: 100 % O2 Device: None (Room air)    Vitals:    " 06/20/23 0936   Weight: 120.2 kg (265 lb)     Vital Signs with Ranges  Temp:  [98.1  F (36.7  C)-98.5  F (36.9  C)] 98.3  F (36.8  C)  Pulse:  [] 90  Resp:  [10-39] 39  BP: (123-190)/() 162/92  SpO2:  [94 %-100 %] 100 %  I/O last 3 completed shifts:  In: 247.5 [I.V.:247.5]  Out: 600 [Urine:600]    Constitutional: nad  Respiratory: CTAB, breathing easily   Cardiovascular: RRR no r/g/m  GI: soft, nt nd  Skin: no rash or edema  Musculoskeletal: no focal jt swelling or redness  Neurologic: nl speech and mentation  No tremor or abnormal movements. Grossly nonfocal.   Neuropsychiatric: slightly anxious. Pleasant, conversant.     Discharge Disposition   Discharged to home  Condition at discharge: Good    Consultations This Hospital Stay   GASTROENTEROLOGY IP CONSULT    Time Spent on this Encounter   I, Ramakrishna Huff MD, personally saw the patient today and spent greater than 30 minutes discharging this patient.    Discharge Orders      Reason for your hospital stay    1. Poorly controlled type 2 diabetes,   2. Episode of chest pain and arm pain, resolved. Muscle pain versus possible cardiac/heart pain. Normal ultrasound of heart and CT scan of heart blood vessels is reassuring. Would be prudent for outpatient heart stress test given you have diabetes to ensure no underlying heart/heart blood vessel disease  3. Episode of nausea and vomiting in setting of esophageal (food pipe) abnormality on CT scan and chronic heart burn taking tums. Also with weight loss. Recommend upper endoscopy scope of esophagus and stomach to rule out lesions and/or inflammation that could be contributing to all these symptoms as discussed.   4. Dehydration- resolved  5. Amphetamine and benzodiazepine in urine toxicology screen     Follow-up and recommended labs and tests     1. Follow up with primary care doctor as scheduled. I recommend moving this appointment up to next week or seen one of their partners next week to follow up  the medical issues noted in hospital.   2. Recommend dietician referral by your primary care doctor  3. Recommend following up with [facility] for consideration of upper endoscopy and discuss heart burn and abnormality on esophagus. They will call you to schedule an appointment to discuss options in ~1 week. Their phone number is [phone] if need to contact them.     Activity    Your activity upon discharge: activity as tolerated     Patient care order    1. For now recommend (as does gastroenterologist) prilosec or protonix. Can get over the counter. Take on empty stomach at least once per day 30-60 minutes before breakfast.  Take one tablet per day for now.     Full Code     Diet    Follow this diet upon discharge: Orders Placed This Encounter      Moderate Consistent Carb (60 g CHO per Meal) Diet    Diabetic diet as discussed.     Discharge Medications   Current Discharge Medication List      CONTINUE these medications which have NOT CHANGED    Details   glipiZIDE (GLUCOTROL) 5 MG tablet Take 1 tablet (5 mg) by mouth 2 times daily (before meals)  Qty: 60 tablet, Refills: 2    Associated Diagnoses: Type 2 diabetes mellitus without complication, without long-term current use of insulin (H)      silver sulfADIAZINE (SILVADENE) 1 % external cream Apply to wound right foot once daily and cover with bandage  Qty: 50 g, Refills: 0    Associated Diagnoses: Burn, foot, second degree, left, initial encounter           Allergies   No Known Allergies  Data   Most Recent 3 CBC's:Recent Labs   Lab Test 06/21/23  0602 06/20/23  1715 06/20/23  0921   WBC 8.4 11.2* 15.3*   HGB 15.9 15.8 18.0*   MCV 84 81 81    240 272      Most Recent 3 BMP's:  Recent Labs   Lab Test 06/21/23  0602 06/21/23  0150 06/20/23  2151 06/20/23  1924 06/20/23  1715 06/20/23  1336 06/20/23  0921     --   --   --  138  --  136   POTASSIUM 4.1  --   --   --  3.6  --  4.6   CHLORIDE 105  --   --   --  106  --  97*   CO2 21*  --    --   --  21*  --  16*   BUN 8.0  --   --   --  9.3  --  13.3   CR 0.61*  --   --   --  0.59*  --  0.75   ANIONGAP 11  --   --   --  11  --  23*   ARELY 8.6  --   --   --  8.5*  --  9.8   * 261* 216*   < > 238*   < > 461*    < > = values in this interval not displayed.     Most Recent 2 LFT's:  Recent Labs   Lab Test 06/21/23  0602 06/20/23  0921   AST 20 23   ALT 33 46   ALKPHOS 68 84   BILITOTAL 1.0 0.7     Most Recent INR's and Anticoagulation Dosing History:  Anticoagulation Dose History         No data to display              Most Recent 3 Troponin's:No lab results found.  Most Recent Cholesterol Panel:  Recent Labs   Lab Test 05/26/23  1622   CHOL 218*   LDL 89   HDL 59   TRIG 348*     Most Recent 6 Bacteria Isolates From Any Culture (See EPIC Reports for Culture Details):No lab results found.  Most Recent TSH, T4 and A1c Labs:  Recent Labs   Lab Test 06/20/23  0921 05/26/23  1622   TSH 2.40  --    A1C  --  10.6*     Results for orders placed or performed during the hospital encounter of 06/20/23   CT Aortic Survey w Contrast    Narrative    CT AORTIC SURVEY WITH CONTRAST June 20, 2023 12:36 PM    HISTORY: Abdominal pain with elevated white count and tachycardia.  Also has chest pain.    TECHNIQUE: CT scan obtained of the chest, abdomen, and pelvis with IV  contrast. Post contrast scanning performed during the arterial phase.  CT chest also obtained without IV contrast. 150mL Isovue-370 IV  injected. Sagittal and coronal reformatted images acquired from the  source post contrast data. Radiation dose for this scan was reduced  using automated exposure control, adjustment of the mA and/or kV  according to patient size, or iterative reconstruction technique.    COMPARISON: Chest x-ray on 8/13/2022.    FINDINGS:  Thoracic and abdominal aorta: No evidence of thoracolumbar abdominal  aortic aneurysm or dissection. Mild atherosclerotic vascular  calcification of the thoracoabdominal aorta.    Other vascular: The  major branches of the proximal abdominal aorta are  patent.    Chest/mediastinum: No cardiomegaly or significant pericardial  effusion. No significant mediastinal, hilar or axillary  lymphadenopathy. Moderate atherosclerotic vascular calcification of  the coronary arteries. Mid and distal esophageal wall thickening, of  indeterminate etiology.    Lung/pleura: No pleural effusion or pneumothorax. Mild basilar  pulmonary opacities, likely atelectasis.    Abdomen/pelvis:    Hepatobiliary: Diffuse hyperdense appearance of the liver, likely to  underlying hepatic steatosis. The gallbladder is unremarkable.    Pancreas: No main pancreatic ductal dilatation or definite solid  pancreatic mass.    Spleen: The spleen is mildly enlarged measuring 13.1 cm in  craniocaudal dimension.    Adrenal glands: No adrenal nodules.    Kidneys: No radiodense kidney/ureteral stones or hydronephrosis in the  kidney.    Bowel: No abnormally dilated bowel loops.    Peritoneum: No significant free fluid in the abdomen or pelvis. No  free peritoneal portal venous gas.    Pelvic organs: Unremarkable.    Lymph nodes: No significant abdominopelvic lymphadenopathy.    Bones and soft tissue: No suspicious osseous lesion. Small  fat-containing left inguinal hernia.      Impression    IMPRESSION:   1. No evidence for thoracoabdominal aortic aneurysm or dissection.  2. Hepatic steatosis.  3. Splenomegaly.  4. Mid and distal esophageal wall thickening, of indeterminate  etiology, could be seen with esophagitis.    ANGLE MADRID MD         SYSTEM ID:  [id number]   Echocardiogram Complete     Value    LVEF  55-60%    Narrative    [id number]  [id number]  [id number]  [id number]^SABIHA^KOJO     [facility]  Echocardiography Laboratory  [address] [place] [address]     Name: REJI MARSHALL  MRN: [id number]  : 1970  Study Date: 2023 05:20 PM  Age: 53 yrs  Gender: Male  [facility]  Reason For Study:  Tachycardia, Chest Pain  Ordering Physician: SABIHA CANALES  Referring Physician: SABIHA CANALES  Performed By: [facility] Thalia Haji     BSA: 2.4 m2  Height: 74 in  Weight: 265 lb  HR: 110  BP: 154/93 mmHg  ______________________________________________________________________________  Procedure  Complete Portable Echo Adult. Optison (NDC #1146-8693) given intravenously.  Technically difficult study.  ______________________________________________________________________________  Interpretation Summary     The visual ejection fraction is 55-60%. Grossly normal wall motion.  Right ventricular function cannot be assessed due to poor image quality. On  very limited views, RV function looks probably normal.  No hemodynamically significant valve disease.  The inferior vena cava was normal in size with preserved respiratory  variability.  There is no pericardial effusion.     Technically difficult study despite contrast use due to patient motion. The  rhythm was sinus tachycardia.  ______________________________________________________________________________  Left Ventricle  The left ventricle is normal in size. There is borderline concentric left  ventricular hypertrophy. There is concentric remodeling present. The visual  ejection fraction is 55-60%. Diastolic function not assessed due to  tachycardia. Diastolic Doppler findings (E/E' ratio and/or other parameters)  suggest left ventricular filling pressures are indeterminate.     Right Ventricle  Right ventricular function cannot be assessed due to poor image quality. On  very limited views, RV function looks probably normal.     Atria  Normal left atrial size. Right atrial size is normal.     Mitral Valve  The mitral valve is normal in structure and function. There is trace mitral  regurgitation.     Tricuspid Valve  The tricuspid valve is not well visualized. Right ventricular systolic  pressure could not be approximated due to inadequate tricuspid  regurgitation.     Aortic Valve  The aortic valve is trileaflet with aortic valve sclerosis. No aortic  regurgitation is present. No hemodynamically significant valvular aortic  stenosis.     Pulmonic Valve  The pulmonic valve is not well visualized.     Vessels  The aortic root is normal size. The inferior vena cava was normal in size with  preserved respiratory variability.     Pericardium  There is no pericardial effusion.     Rhythm  The rhythm was sinus tachycardia.  ______________________________________________________________________________  MMode/2D Measurements & Calculations  IVSd: 1.2 cm     LVIDd: 4.8 cm  LVIDs: 3.1 cm  LVPWd: 1.3 cm  FS: 35.4 %  LV mass(C)d: 232.2 grams  LV mass(C)dI: 94.8 grams/m2  Ao root diam: 3.3 cm  asc Aorta Diam: 3.6 cm  LVOT diam: 2.1 cm  LVOT area: 3.5 cm2  LA Volume (BP): 42.0 ml  LA Volume Index (BP): 17.1 ml/m2  RWT: 0.54  TAPSE: 2.1 cm     Doppler Measurements & Calculations  MV E max jonas: 105.0 cm/sec  MV dec time: 0.14 sec  PA acc time: 0.12 sec  E/E' av.6  Lateral E/e': 10.1  Medial E/e': 9.1  RV S Jonas: 19.1 cm/sec     ______________________________________________________________________________  Report approved by: Rajeev Nesbitt 2023 06:18 PM

## 2023-06-21 NOTE — PROGRESS NOTES
Pt arrived to floor approx 1850. VVS on room air. Pt settled into room. Handoff given to oncoming RN.

## 2023-06-21 NOTE — UTILIZATION REVIEW
"  Admission Status; Secondary Review Determination         Under the authority of the Utilization Management Committee, the utilization review process indicated a secondary review on the above patient.  The review outcome is based on review of the medical records, discussions with staff, and applying clinical experience noted on the date of the review.        ()      Inpatient Status Appropriate - This patient's medical care is consistent with medical management for inpatient care and reasonable inpatient medical practice.      (xxx) Observation Status Appropriate - This patient does not meet hospital inpatient criteria and is placed in observation status. If this patient's primary payer is Medicare and was admitted as an inpatient, Condition Code 44 should be used and patient status changed to \"observation\".   () Admission Status NOT Appropriate - This patient's medical care is not consistent with medical management for Inpatient or Observation Status.          RATIONALE FOR DETERMINATION     Scout Perez is a 53 year old male with history of diabetes mellitus type 2 was admitted on 6/20/2023 with chest and abdominal pain, nausea and vomiting.  He was noted to be tachycardic, diaphoretic, and dehydrated.  He was given IVF bolus x4 in the ER.  CT scan of abdomen and pelvis showed mid and distal esophageal wall thickening.  WBC elevated at 15 and glucose 461.  Troponin and EKG normal.  BC and UC sent. LA initially elevated but normalized with IVF. He was admitted for IVF, IV antibiotics, GI consultation and close clinical monitoring.  This morning WBC near normal and pain improved.  GI recommends EGD but patient prefers to do this outpatient.  I spoke with Dr. Huff and the patient was able to tolerate po, remained afebrile, and was clinically improving.  He requested to be discharged today.  Observation status is appropriate.    The severity of illness, intensity of service provided, expected LOS and risk for " adverse outcome make the care complex, high risk and appropriate for hospital admission.        The information on this document is developed by the utilization review team in order for the business office to ensure compliance.  This only denotes the appropriateness of proper admission status and does not reflect the quality of care rendered.         The definitions of Inpatient Status and Observation Status used in making the determination above are those provided in the CMS Coverage Manual, Chapter 1 and Chapter 6, section 70.4.      Sincerely,     Dasha Baldwin MD  Physician Advisor   Utilization Review/ Case Management  [facility].

## 2023-06-21 NOTE — PROGRESS NOTES
[facility]  Gastroenterology Progress Note     Scout Perez MRN# [id number]   YOB: 1970 Age: 53 year old          Assessment and Plan:   Scout Perez is a 53 year old male with history of diabetes mellitus type 2 was brought to the ER by EMS for evaluation of chest and abdominal pain, nausea and vomiting     Abdominal pain, epigastric  Nausea and vomiting  Chest pain, unspecified type  Methamphetamine abuse (H)  WBC 15k->8k, Tachy on admission now HR in 90s. Lactic acidosis- resolved  CT of A/P negative  Echo- noted sinus tach  Amphetamine and Benzodiazepine positive  Concern for PUD, esophagitis, gastritis    -- refused EGD today  -- pantoprazole daily  -- GI will follow as outpatient if patient leaves AMA                Interval History:   no new complaints, doing well and doing well; no cp, sob, n/v/d, or abd pain. Refusing EGD- states feels fine. Wants to eat and leave.              Review of Systems:   C: NEGATIVE for fever, chills, change in weight  E/M: NEGATIVE for ear, mouth and throat problems  R: NEGATIVE for significant cough or SOB  CV: NEGATIVE for chest pain, palpitations or peripheral edema             Medications:   I have reviewed this patient's current medications    enoxaparin ANTICOAGULANT  40 mg Subcutaneous Q24H     insulin aspart  1-7 Units Subcutaneous TID AC     insulin aspart  1-5 Units Subcutaneous At Bedtime     insulin aspart   Subcutaneous TID AC     insulin glargine  5 Units Subcutaneous Once     pantoprazole  40 mg Intravenous Daily with breakfast     piperacillin-tazobactam  4.5 g Intravenous Q6H     silver sulfADIAZINE   Topical BID     sodium chloride (PF)  3 mL Intracatheter Q8H                  Physical Exam:   Vitals were reviewed  Vital Signs with Ranges  Temp:  [98.1  F (36.7  C)-98.5  F (36.9  C)] 98.3  F (36.8  C)  Pulse:  [] 92  Resp:  [10-33] 26  BP: (123-190)/() 159/99  SpO2:  [94 %-100 %] 99 %  I/O last 3  completed shifts:  In: 247.5 [I.V.:247.5]  Out: 600 [Urine:600]  Constitutional: healthy, alert and no distress   Cardiovascular: negative, PMI normal. No lifts, heaves, or thrills. RRR. No murmurs, clicks gallops or rub  Respiratory: negative, Percussion normal. Good diaphragmatic excursion. Lungs clear  Abdomen: Abdomen soft, non-tender. BS normal. No masses, organomegaly           Data:   I reviewed the patient's new clinical lab test results.   Recent Labs   Lab Test 06/21/23  0602 06/20/23  1715 06/20/23  0921   WBC 8.4 11.2* 15.3*   HGB 15.9 15.8 18.0*   MCV 84 81 81    240 272     Recent Labs   Lab Test 06/21/23  0602 06/20/23  1715 06/20/23  0921   POTASSIUM 4.1 3.6 4.6   CHLORIDE 105 106 97*   CO2 21* 21* 16*   BUN 8.0 9.3 13.3   ANIONGAP 11 11 23*     Recent Labs   Lab Test 06/21/23  0602 06/20/23  1348 06/20/23  0928 06/20/23  0921 05/26/23  1622 08/13/22  1256 07/23/22  1950 07/23/22  1950 02/08/21  1327   ALBUMIN 3.8  --   --  4.8 4.9 4.0   < > 4.4  --    BILITOTAL 1.0  --   --  0.7 0.6 0.7   < > 1.1  --    ALT 33  --   --  46 48 49   < > 47  --    AST 20  --   --  23 33 28   < > 28  --    PROTEIN  --   --  200*  --   --   --   --   --  Negative   LIPASE  --  15  --   --   --  97  --  87  --     < > = values in this interval not displayed.       I reviewed the patient's new imaging results.    All laboratory data reviewed  All imaging studies reviewed by me.    Rashida Sorensen PA-C,  6/21/2023  [facility]  Office : [phone]  Cell: [phone] (Dr. Pelayo)  Cell: [phone] (Rashida Sorensen PA-C)

## 2023-06-21 NOTE — PROGRESS NOTES
Orientations: A&O x3, d/t situation and forgetful. neuros intact. LS clear bilaterally.  Vitals/Pain: VSS on RA, sometimes Tachy. Denies pain this shift  Tele: SR/ST  Lines/Drains: PIV x 2, NS infusing @ 150mL, int Abx awaiting culture results.  Skin/Wounds: No skin issues  GI/: Became NPO @ midnight, voiding spontaneously with adequate output. No BM this shift   Labs: Abnormal/Trends, Electrolyte Replacement- Lactic acid 2.6  Ambulation/Assist: SBA  Sleep Quality: Good  Plan: Possible EGD today.
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
## 2023-06-22 ENCOUNTER — OFFICE VISIT (OUTPATIENT)
Dept: INTERNAL MEDICINE | Facility: CLINIC | Age: 53
End: 2023-06-22
Payer: COMMERCIAL

## 2023-06-22 ENCOUNTER — TELEPHONE (OUTPATIENT)
Dept: PSYCHOLOGY | Facility: CLINIC | Age: 53
End: 2023-06-22

## 2023-06-22 ENCOUNTER — PATIENT OUTREACH (OUTPATIENT)
Dept: CARE COORDINATION | Facility: CLINIC | Age: 53
End: 2023-06-22

## 2023-06-22 ENCOUNTER — NURSE TRIAGE (OUTPATIENT)
Dept: NURSING | Facility: CLINIC | Age: 53
End: 2023-06-22

## 2023-06-22 VITALS
DIASTOLIC BLOOD PRESSURE: 96 MMHG | WEIGHT: 262 LBS | HEIGHT: 74 IN | SYSTOLIC BLOOD PRESSURE: 185 MMHG | HEART RATE: 94 BPM | BODY MASS INDEX: 33.62 KG/M2 | RESPIRATION RATE: 20 BRPM | OXYGEN SATURATION: 97 % | TEMPERATURE: 99.5 F

## 2023-06-22 DIAGNOSIS — F81.9 LEARNING DISABILITY: ICD-10-CM

## 2023-06-22 DIAGNOSIS — F41.9 ANXIETY: ICD-10-CM

## 2023-06-22 DIAGNOSIS — I10 BENIGN ESSENTIAL HYPERTENSION: ICD-10-CM

## 2023-06-22 DIAGNOSIS — Z09 HOSPITAL DISCHARGE FOLLOW-UP: Primary | ICD-10-CM

## 2023-06-22 DIAGNOSIS — E11.9 TYPE 2 DIABETES MELLITUS WITHOUT COMPLICATION, WITHOUT LONG-TERM CURRENT USE OF INSULIN (H): ICD-10-CM

## 2023-06-22 PROCEDURE — 99495 TRANSJ CARE MGMT MOD F2F 14D: CPT

## 2023-06-22 RX ORDER — LANCETS
EACH MISCELLANEOUS
Qty: 100 EACH | Refills: 6 | Status: ON HOLD | OUTPATIENT
Start: 2023-06-22 | End: 2023-08-15

## 2023-06-22 RX ORDER — GLIPIZIDE 5 MG/1
5 TABLET ORAL
Qty: 60 TABLET | Refills: 2 | Status: SHIPPED | OUTPATIENT
Start: 2023-06-22 | End: 2023-08-04 | Stop reason: SINTOL

## 2023-06-22 RX ORDER — LISINOPRIL 10 MG/1
10 TABLET ORAL DAILY
Qty: 90 TABLET | Refills: 0 | Status: SHIPPED | OUTPATIENT
Start: 2023-06-22 | End: 2023-09-18

## 2023-06-22 ASSESSMENT — ANXIETY QUESTIONNAIRES
3. WORRYING TOO MUCH ABOUT DIFFERENT THINGS: MORE THAN HALF THE DAYS
GAD7 TOTAL SCORE: 14
5. BEING SO RESTLESS THAT IT IS HARD TO SIT STILL: MORE THAN HALF THE DAYS
6. BECOMING EASILY ANNOYED OR IRRITABLE: MORE THAN HALF THE DAYS
GAD7 TOTAL SCORE: 14
1. FEELING NERVOUS, ANXIOUS, OR ON EDGE: MORE THAN HALF THE DAYS
7. FEELING AFRAID AS IF SOMETHING AWFUL MIGHT HAPPEN: MORE THAN HALF THE DAYS
2. NOT BEING ABLE TO STOP OR CONTROL WORRYING: MORE THAN HALF THE DAYS
IF YOU CHECKED OFF ANY PROBLEMS ON THIS QUESTIONNAIRE, HOW DIFFICULT HAVE THESE PROBLEMS MADE IT FOR YOU TO DO YOUR WORK, TAKE CARE OF THINGS AT HOME, OR GET ALONG WITH OTHER PEOPLE: SOMEWHAT DIFFICULT

## 2023-06-22 ASSESSMENT — PATIENT HEALTH QUESTIONNAIRE - PHQ9
5. POOR APPETITE OR OVEREATING: MORE THAN HALF THE DAYS
SUM OF ALL RESPONSES TO PHQ QUESTIONS 1-9: 15

## 2023-06-22 NOTE — PATIENT INSTRUCTIONS
Work with vocational worker to try to move jobs to a different location. Ask her about a  as well. Reach out if you need help from a  from our.     Check with insurance about therapy. I have placed referrals     Start lisinopril- it is a medication for your blood pressure- recheck labs in 2 weeks.    Follow up in one month

## 2023-06-22 NOTE — PROGRESS NOTES
[facility]    Background: Transitional Care Management program identified per system criteria and reviewed by [facility] team for possible outreach.    Assessment: Upon chart review, [facility] Team member will not proceed with patient outreach related to this episode of Transitional Care Management program due to reason below:    Patient has a follow up appointment with an appropriate provider today for hospital discharge    Plan: Transitional Care Management episode addressed appropriately per reason noted above.      Milly Gonzalez RN  [facility]    *Connected Care Resource Team does NOT follow patient ongoing. Referrals are identified based on internal discharge reports and the outreach is to ensure patient has an understanding of their discharge instructions.
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
## 2023-06-22 NOTE — LETTER
June 22, 2023      Scout Perez  [address] [place] [address]        To Whom It May Concern:    Scout Perez  was seen on 6/22/2023.  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        Sincerely,        Deacon Bernard NP
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
## 2023-06-23 ENCOUNTER — TELEPHONE (OUTPATIENT)
Dept: INTERNAL MEDICINE | Facility: CLINIC | Age: 53
End: 2023-06-23
Payer: COMMERCIAL

## 2023-06-23 ENCOUNTER — PATIENT OUTREACH (OUTPATIENT)
Dept: CARE COORDINATION | Facility: CLINIC | Age: 53
End: 2023-06-23
Payer: COMMERCIAL

## 2023-06-23 NOTE — PROGRESS NOTES
Clinic Care Coordination Contact  [facility]/Voicemail    Clinical Data: Care Coordinator Outreach    [facility] received call/vm from patient 6/21/2023 requesting return call regarding next steps after discharge from hospital. Patient voiced concerns in regards to lack of trust with medical system and potential job loss. Per chart review, patient had post-hospital follow-up with provider 6/22/2023 in which above concerns were addressed. Provider also placed referral for Adult Mental Health and patient is scheduled to meet with MH provider 6/27/2023 for further support/resources. No further CC needs identified during provider visit. [facility] attempted to return patients call to review above, however, patient unavailable. Left message on patient's voicemail with call back information and requested return call shall patient have any additional CC needs.    Plan: Care Coordinator will do no further outreaches at this time, however, will remain available as needed. If new needs arise, a new Care Coordination Referral may be placed.     FARHAN Hernandez/[facility]  Social Work Care Coordinator  [facility] [place] [place] [place]  Phone: [phone]

## 2023-06-23 NOTE — TELEPHONE ENCOUNTER
Patient calls saying pharmacy will not work with his insurance to cover the blood sugar monitor. He was told at his appointment that we could order one for him and he can pick it up in office .    Last office visit 6-    pls advise     Patient number [phone]

## 2023-06-23 NOTE — TELEPHONE ENCOUNTER
"Called and spoke with pharmacy staff who stated that patient picked up the blood glucose monitor as well as the lancets and test strips on 6/22/23 and no issues reported with pharmacy as they were able to run it through insurance with no co pay.     Called over to patient who stated that his insurance won't cover a monitor after he reports that his monitor was given to him broken by the pharmacy and \"came in all kinds of pieces\". Patient reports he brought the glucose monitor, along with the test strips and lancets back to the pharmacy this am and told them to keep them and he will reach out to his doctor for all new supplies.    Called and spoke to pharmacy who stated that patient dropped off all supplies and walked away without giving pharmacy a chance to respond. Pharmacy staff stated that they will hang onto his supplies for now and said patient can call [employer] customer service to explain his meter arrived damaged and they should be able to send him a new one to his home with no cost. Pharmacy staff advised appointment with diabetes education for patient to learn about his new monitor.     Called patient back and left message for patient to call back.    Sánchez Cordero, Triage RN [facility]  12:39 PM 6/23/2023     "

## 2023-06-25 LAB
BACTERIA BLD CULT: NO GROWTH
BACTERIA BLD CULT: NO GROWTH

## 2023-06-26 ENCOUNTER — OFFICE VISIT (OUTPATIENT)
Dept: PODIATRY | Facility: CLINIC | Age: 53
End: 2023-06-26
Payer: OTHER MISCELLANEOUS

## 2023-06-26 VITALS
HEIGHT: 74 IN | DIASTOLIC BLOOD PRESSURE: 92 MMHG | WEIGHT: 262 LBS | HEART RATE: 92 BPM | SYSTOLIC BLOOD PRESSURE: 148 MMHG | BODY MASS INDEX: 33.62 KG/M2

## 2023-06-26 DIAGNOSIS — Z02.6 WORKER'S COMPENSATION CLAIM ADMINISTRATIVE PROBLEM: Primary | ICD-10-CM

## 2023-06-26 DIAGNOSIS — T25.222A BURN, FOOT, SECOND DEGREE, LEFT, INITIAL ENCOUNTER: ICD-10-CM

## 2023-06-26 PROCEDURE — 99213 OFFICE O/P EST LOW 20 MIN: CPT | Performed by: PODIATRIST
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
## 2023-06-26 NOTE — NURSING NOTE
"Chief Complaint   Patient presents with     RECHECK     Follow-up work comp- no concerns       Initial BP (!) 148/92   Pulse 92   Ht 1.88 m (6' 2\")   Wt 118.8 kg (262 lb)   BMI 33.64 kg/m   Estimated body mass index is 33.64 kg/m  as calculated from the following:    Height as of this encounter: 1.88 m (6' 2\").    Weight as of this encounter: 118.8 kg (262 lb).  Medications and allergies reviewed.      Liana MENA MA    "

## 2023-06-26 NOTE — LETTER
June 26, 2023      Scout Perez  [address] [place] [address]        To Whom It May Concern:    Scout Perez was seen in our clinic.  The patient is cleared to return to work 6/27/2023 without restrictions.  No scheduled follow-up is planned.      Sincerely,      Deacon Tavares DPM FACFAS FACFAOM  Podiatric Foot & Ankle Surgeon  [facility]

## 2023-06-26 NOTE — Clinical Note
"    6/26/2023         RE: Scout Perez  [address] [place] [facility] [place] [address]        Dear Colleague,    Thank you for referring your patient, Scout Perez, to the [facility]. Please see a copy of my visit note below.    Foot & Ankle Surgery   June 26, 2023    S:  Pt is seen today for evaluation of ***.    Vitals:    06/26/23 1545   BP: (!) 148/92   Pulse: 92   Weight: 118.8 kg (262 lb)   Height: 1.88 m (6' 2\")   '      ROS - Pos for CC.  Patient denies current nausea, vomiting, chills, fevers, belly pain, calf pain, chest pain or SOB.  Complete remainder of ROS it otherwise neg.  ***    PE:  Gen:   No apparent distress  Eye:    Visual scanning without deficit  Ear:    Response to auditory stimuli wnl  Lung:    Non-labored breathing on RA noted  Abd:    NTND per patient report  Lymph:    Neg for pitting/non-pitting edema BLE  Vasc:    Pulses palpable, CFT minimally delayed  Neuro:    Light touch sensation intact to all sensory nerve distributions without paresthesias  Derm:    Neg for nodules, lesions or ulcerations  MSK:    ROM, strength wnl without limitation, no pain on palpation noted.  Calf:    Neg for redness, swelling or tenderness  ***    Imaging:  ***    Labs:  ***    Cultures:  ***      Assessment:  53 year old male with ***      Medical Decision Making/Plan:  Discussed etiologies, anatomy and options  1.  ***  -***    Follow up:  *** or sooner with acute issues           Deacon Tavares DPM FACFAS FACFAOM  Podiatric Foot & Ankle Surgeon  [facility]  [phone]    Disclaimer: This note consists of symbols derived from keyboarding, dictation and/or voice recognition software. As a result, there may be errors in the script that have gone undetected. Please consider this when interpreting information found in this chart.          Again, thank you for allowing me to participate in the care of your patient.        Sincerely,        Deacon Tavares, " DPM, DPM

## 2023-06-27 NOTE — TELEPHONE ENCOUNTER
Our office does have sample [employer] Me meter available and we can schedule a RN appointment with patient to ensure he knows how to use meter.     Meter being held at this writer's desk in  clinic pending patient call back.     Darlin Khanna RN  [facility]

## 2023-07-06 ENCOUNTER — LAB (OUTPATIENT)
Dept: LAB | Facility: CLINIC | Age: 53
End: 2023-07-06
Payer: COMMERCIAL

## 2023-07-06 DIAGNOSIS — I10 BENIGN ESSENTIAL HYPERTENSION: ICD-10-CM

## 2023-07-06 LAB
ANION GAP SERPL CALCULATED.3IONS-SCNC: 12 MMOL/L (ref 7–15)
BUN SERPL-MCNC: 16 MG/DL (ref 6–20)
CALCIUM SERPL-MCNC: 9.8 MG/DL (ref 8.6–10)
CHLORIDE SERPL-SCNC: 104 MMOL/L (ref 98–107)
CREAT SERPL-MCNC: 0.7 MG/DL (ref 0.67–1.17)
DEPRECATED HCO3 PLAS-SCNC: 22 MMOL/L (ref 22–29)
GFR SERPL CREATININE-BSD FRML MDRD: >90 ML/MIN/1.73M2
GLUCOSE SERPL-MCNC: 216 MG/DL (ref 70–99)
POTASSIUM SERPL-SCNC: 4.3 MMOL/L (ref 3.4–5.3)
SODIUM SERPL-SCNC: 138 MMOL/L (ref 136–145)

## 2023-07-06 PROCEDURE — 36415 COLL VENOUS BLD VENIPUNCTURE: CPT

## 2023-07-06 PROCEDURE — 80048 BASIC METABOLIC PNL TOTAL CA: CPT

## 2023-07-06 NOTE — LETTER
July 10, 2023      Scout Perez  [address] [facility] [address]        Dear ,    We are writing to inform you of your test results.    Your labs are in acceptable ranges- continue your medication as prescribed.    Resulted Orders   Basic metabolic panel  (Ca, Cl, CO2, Creat, Gluc, K, Na, BUN)   Result Value Ref Range    Sodium 138 136 - 145 mmol/L    Potassium 4.3 3.4 - 5.3 mmol/L    Chloride 104 98 - 107 mmol/L    Carbon Dioxide (CO2) 22 22 - 29 mmol/L    Anion Gap 12 7 - 15 mmol/L    Urea Nitrogen 16.0 6.0 - 20.0 mg/dL    Creatinine 0.70 0.67 - 1.17 mg/dL    Calcium 9.8 8.6 - 10.0 mg/dL    Glucose 216 (H) 70 - 99 mg/dL    GFR Estimate >90 >60 mL/min/1.73m2       If you have any questions or concerns, please call the clinic at the number listed above.       Sincerely,      Deacon Bernard NP

## 2023-07-10 ENCOUNTER — TELEPHONE (OUTPATIENT)
Dept: PODIATRY | Facility: CLINIC | Age: 53
End: 2023-07-10

## 2023-07-10 NOTE — TELEPHONE ENCOUNTER
Reason for Call:  Form, our goal is to have forms completed with 7 days, however, some forms may require a visit or additional information.    Type of letter, form or note:  work comp     Who is the form from?: ANTHONY Tarango    Where did the form come from: form was mailed in    Phone number of person requesting form: [phone]  Can we leave a detailed message on this number:  YES    Desired completion date of form: asap      How will form be returned?:  fax to [phone]    Has the patient signed a consent form for release of information (may be included with form)? Not Applicable    Form was started and place in Provider Basket for provider review/ completion at [facility].     Ivelisse Rondon MBA, ATC

## 2023-07-11 ENCOUNTER — TELEPHONE (OUTPATIENT)
Dept: PSYCHOLOGY | Facility: CLINIC | Age: 53
End: 2023-07-11
Payer: COMMERCIAL

## 2023-07-13 NOTE — TELEPHONE ENCOUNTER
Forms completed, will fax later today.    Deacon Tavares DPM [facility]  Podiatric Foot & Ankle Surgeon  [facility]  [phone]

## 2023-07-27 ENCOUNTER — OFFICE VISIT (OUTPATIENT)
Dept: INTERNAL MEDICINE | Facility: CLINIC | Age: 53
End: 2023-07-27
Payer: COMMERCIAL

## 2023-07-27 ENCOUNTER — TELEPHONE (OUTPATIENT)
Dept: PODIATRY | Facility: CLINIC | Age: 53
End: 2023-07-27

## 2023-07-27 VITALS
RESPIRATION RATE: 16 BRPM | OXYGEN SATURATION: 96 % | WEIGHT: 268.4 LBS | DIASTOLIC BLOOD PRESSURE: 80 MMHG | SYSTOLIC BLOOD PRESSURE: 136 MMHG | TEMPERATURE: 98.6 F | HEART RATE: 101 BPM | BODY MASS INDEX: 34.44 KG/M2 | HEIGHT: 74 IN

## 2023-07-27 DIAGNOSIS — I10 BENIGN ESSENTIAL HYPERTENSION: ICD-10-CM

## 2023-07-27 DIAGNOSIS — E11.9 TYPE 2 DIABETES MELLITUS WITHOUT COMPLICATION, WITHOUT LONG-TERM CURRENT USE OF INSULIN (H): Primary | ICD-10-CM

## 2023-07-27 PROBLEM — E08.65 DIABETES MELLITUS DUE TO UNDERLYING CONDITION WITH HYPERGLYCEMIA, WITHOUT LONG-TERM CURRENT USE OF INSULIN (H): Status: RESOLVED | Noted: 2023-06-21 | Resolved: 2023-07-27

## 2023-07-27 PROBLEM — R10.13 ABDOMINAL PAIN, EPIGASTRIC: Status: RESOLVED | Noted: 2023-06-20 | Resolved: 2023-07-27

## 2023-07-27 PROBLEM — R07.9 CHEST PAIN, UNSPECIFIED TYPE: Status: RESOLVED | Noted: 2023-06-20 | Resolved: 2023-07-27

## 2023-07-27 LAB — HBA1C MFR BLD: 9 % (ref 0–5.6)

## 2023-07-27 PROCEDURE — 99214 OFFICE O/P EST MOD 30 MIN: CPT | Performed by: INTERNAL MEDICINE

## 2023-07-27 PROCEDURE — 83036 HEMOGLOBIN GLYCOSYLATED A1C: CPT | Performed by: INTERNAL MEDICINE

## 2023-07-27 PROCEDURE — 36415 COLL VENOUS BLD VENIPUNCTURE: CPT | Performed by: INTERNAL MEDICINE

## 2023-07-27 ASSESSMENT — PAIN SCALES - GENERAL: PAINLEVEL: NO PAIN (0)

## 2023-07-27 ASSESSMENT — ENCOUNTER SYMPTOMS
PARESTHESIAS: 1
UNEXPECTED WEIGHT CHANGE: 1
GASTROINTESTINAL NEGATIVE: 1
FATIGUE: 1
RESPIRATORY NEGATIVE: 1
APPETITE CHANGE: 1
CARDIOVASCULAR NEGATIVE: 1
MUSCULOSKELETAL NEGATIVE: 1

## 2023-07-27 NOTE — TELEPHONE ENCOUNTER
Reason for Call:  Form, our goal is to have forms completed with 7 days, however, some forms may require a visit or additional information.    Type of letter, form or note:  work comp     Who is the form from?: Patient    Where did the form come from: form was mailed in    Phone number of person requesting form: [phone]  Can we leave a detailed message on this number:  NO    Desired completion date of form: asap      How will form be returned?: Mailed to patient at [address] [place] [address], per patient request    Has the patient signed a consent form for release of information (may be included with form)? Not Applicable    Additional comments: Made a copy of completed form and sent to scan.    Form was completed and signed by Dr. Tavares at [place].    Ismael Faulkner, Visit Facilitator

## 2023-07-27 NOTE — PROGRESS NOTES
Assessment & Plan     Type 2 diabetes mellitus without complication, without long-term current use of insulin (H)  At this time, I suspect that his symptoms of polyphasia, weight gain, and fatigue are likely related to his blood sugar being under poor control.  Patient has been very reluctant to take medications consistently, but he does state that he has tried to take his glipizide as prescribed.  Patient was agreeable to submit a repeat blood sample today for a follow-up hemoglobin A1c.  Results are currently pending.  He will be contacted as soon as his results are available for review.  Patient states that he would prefer to be contacted via phone after 3 PM.  For the time being, I did recommend that he continue his glipizide at 5 mg by mouth twice per day.  Side effects of medication were reviewed.  Further recommendations will be made once his lab results return.  - Hemoglobin A1c; Future      Benign essential hypertension  Patient has had substantial improvement in his blood pressure after being placed on lisinopril.  His blood pressure does appear to be under much better control at this time.  He does have a metabolic panel that is currently pending.  Assuming no unexpected abnormalities on his metabolic panel, we will continue his lisinopril at 10 mg by mouth per day.  Side effects of medication were reviewed.  We will continue to monitor for any changes in his blood pressure.  - Basic metabolic panel  (Ca, Cl, CO2, Creat, Gluc, K, Na, BUN); Future      Review of external notes as documented elsewhere in note  Ordering of each unique test  30 minutes spent by me on the date of the encounter doing chart review, history and exam, documentation and further activities per the note       See Patient Instructions    Quincy Lyons MD  [facility] MISSAEL Butterfield is a 53 year old, presenting for the following health issues:  Recheck Medication (Patient states that he has  been in a lot of pain since staring on new medication.)        6/22/2023     8:37 AM   Additional Questions   Roomed by HAMILTON Callaway LPN   Accompanied by none       Patient is a 53-year-old  male who presents to the clinic for a follow-up visit.  Patient comes in today with concerns about weight gain, increased appetite, and fatigue.  He was last seen by myself in May 2023 for a follow-up diabetic visit.  At that time, his hemoglobin A1c was noted to be 10.6.  Patient had not been taking any medication for management of his blood sugar as he was concerned about potential side effects related to medication.  Patient was, ultimately, agreeable to start taking glipizide 5 mg twice per day for management of his blood sugar.  Patient was very reluctant to take any other medications.  He reports an approximate 30 pound weight gain, but review of his medical record does show an approximate 10 pound weight gain since May 2023.  Patient was also recently started on lisinopril 10 mg daily after being admitted to the hospital for elevations in his blood pressure in June.  His blood pressure today upon arrival to the clinic was noted to be 146/84.  A repeat blood pressure was noted to be 136/80.  Patient is tolerating both medications without issue.    History of Present Illness       Back Pain:  He presents for follow up of back pain. Patient's back pain is a recurring problem.  Location of back pain:  Other  Description of back pain: burning, shooting, stabbing and other  Back pain spreads: right foot and left foot    Since patient first noticed back pain, pain is: unchanged  Does back pain interfere with his job:  Yes       Diabetes:   He presents for follow up of diabetes.    He is not checking blood glucose.         He has no concerns regarding his diabetes at this time.   He is not experiencing numbness or burning in feet, excessive thirst, blurry vision, weight changes or redness, sores or blisters on feet. The  "patient has not had a diabetic eye exam in the last 12 months.          Headaches:   Since the patient's last clinic visit, headaches are: no change  The patient is getting headaches:  3-4 times a day  He is able to do normal daily activities when he has a migraine.  The patient is taking the following rescue/relief medications:  No rescue/relief medications   Patient states \"I get no relief\" from the rescue/relief medications.   The patient is taking the following medications to prevent migraines:  No medications to prevent migraines  In the past 4 weeks, the patient has gone to an Urgent Care or Emergency Room 0 times times due to headaches.    Reason for visit:  Seeing    He eats 2-3 servings of fruits and vegetables daily.He consumes 6 sweetened beverage(s) daily.He exercises with enough effort to increase his heart rate 9 or less minutes per day.  He exercises with enough effort to increase his heart rate 3 or less days per week.   He is taking medications regularly.         Review of Systems   Constitutional:  Positive for appetite change, fatigue and unexpected weight change.   HENT: Negative.     Respiratory: Negative.     Cardiovascular: Negative.    Gastrointestinal: Negative.    Genitourinary: Negative.    Musculoskeletal: Negative.    Neurological:  Positive for paresthesias.          Objective    Blood pressure 136/80, pulse 101, temperature 98.6  F (37  C), temperature source Oral, resp. rate 16, height 1.88 m (6' 2\"), weight 121.7 kg (268 lb 6.4 oz), SpO2 96 %.    Physical Exam  Vitals reviewed.   HENT:      Head: Normocephalic and atraumatic.      Mouth/Throat:      Mouth: Mucous membranes are moist.      Pharynx: Oropharynx is clear.   Eyes:      Extraocular Movements: Extraocular movements intact.      Conjunctiva/sclera: Conjunctivae normal.      Pupils: Pupils are equal, round, and reactive to light.   Cardiovascular:      Rate and Rhythm: Regular rhythm. Tachycardia present.      Pulses: Normal " pulses.      Heart sounds: Normal heart sounds.   Pulmonary:      Effort: Pulmonary effort is normal.      Breath sounds: Normal breath sounds.   Abdominal:      General: Bowel sounds are normal.      Palpations: Abdomen is soft.   Skin:     General: Skin is warm and dry.   Neurological:      Mental Status: He is alert and oriented to person, place, and time. Mental status is at baseline.        Diagnostic test: Hemoglobin A1c and basic metabolic panel are pending.

## 2023-07-31 ENCOUNTER — TELEPHONE (OUTPATIENT)
Dept: INTERNAL MEDICINE | Facility: CLINIC | Age: 53
End: 2023-07-31
Payer: COMMERCIAL

## 2023-07-31 DIAGNOSIS — E11.9 TYPE 2 DIABETES MELLITUS WITHOUT COMPLICATION, WITHOUT LONG-TERM CURRENT USE OF INSULIN (H): Primary | ICD-10-CM

## 2023-07-31 NOTE — TELEPHONE ENCOUNTER
Given that he is reporting that his symptoms did began after starting glipizide, he could try holding the medication for 2 to 3 days to see if his symptoms do improve.  If they do, then we can consider alternative medications for management of his blood sugar.  If he will return for the additional lab test that was not collected, I will also add had a few other lab test to further evaluate his muscle aches and pains.

## 2023-07-31 NOTE — TELEPHONE ENCOUNTER
Patient calls with concerns about continued muscle pain and soreness that makes it difficult for him to walk. Patient states he had an appointment with Dr. Lyons on Thursday and was told he would get a call on Friday with lab results. A1c was only lab done on 7/27, reviewed result note with patient. Unfortunately, the BMP was not done as ordered.    Patient concerned that he continues to have intermittent symptoms of difficulty moving from feeling sore weak and sore, he also states he gets dizzy and sometimes will have a fever. Patient states his head and body feel hot today and he has fever today of 102.7 and does not think he will be able to work. Patient states this started about 3 weeks after he started Glipizide, which was around Memorial day weekend. Patient states that he will have these symptoms for several days, then the go away for about 3 days before coming back. 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     Patient states that he will also get intermittent pain in center of chest into abdomen that lasts for several hours - patient states he mentioned this to Dr. Lyons at his appointment Thursday, but he told him it was likely diabetes causing this. Patient asks what more he can do to find a cause of this.    BMP was not drawn last week. This writer called the lab, they are not able to run this with the blood drawn on 7/27 and patient will need to come back in to have this drawn. Message sent to provider to review.    Darlin Khanna RN  [facility]

## 2023-07-31 NOTE — TELEPHONE ENCOUNTER
Attempted to contact patient. Left voice message to call back.     Darlin Khanna RN  [facility]

## 2023-07-31 NOTE — TELEPHONE ENCOUNTER
Patient advised of MD abarca. He would like to be seen in clinic as he feels there is something more going on, concerned that he may have cancer or something seriously wrong.    The fever has been coming and going since May 2023.  Was over 102 this morning and now 97.5.  He has not taken any Tylenol or Ibuprofen today.  He was finally able to get out of bed this afternoon after 7 hours in bed because of the body aches.  Patient asking for an appointment with Deacon Lopez NP this week.  Scheduled patient to be seen 8/4/23 and he has also been scheduled for lab draw prior to that appointment.  PENELOPE Donato R.N.

## 2023-08-04 ENCOUNTER — OFFICE VISIT (OUTPATIENT)
Dept: INTERNAL MEDICINE | Facility: CLINIC | Age: 53
End: 2023-08-04
Payer: COMMERCIAL

## 2023-08-04 VITALS
BODY MASS INDEX: 34.47 KG/M2 | TEMPERATURE: 98.5 F | DIASTOLIC BLOOD PRESSURE: 86 MMHG | WEIGHT: 268.6 LBS | HEART RATE: 74 BPM | RESPIRATION RATE: 14 BRPM | OXYGEN SATURATION: 98 % | HEIGHT: 74 IN | SYSTOLIC BLOOD PRESSURE: 150 MMHG

## 2023-08-04 DIAGNOSIS — F15.10 METHAMPHETAMINE ABUSE (H): ICD-10-CM

## 2023-08-04 DIAGNOSIS — Z12.11 SCREEN FOR COLON CANCER: ICD-10-CM

## 2023-08-04 DIAGNOSIS — K21.00 GASTROESOPHAGEAL REFLUX DISEASE WITH ESOPHAGITIS WITHOUT HEMORRHAGE: ICD-10-CM

## 2023-08-04 DIAGNOSIS — I10 BENIGN ESSENTIAL HYPERTENSION: ICD-10-CM

## 2023-08-04 DIAGNOSIS — M25.50 MULTIPLE JOINT PAIN: ICD-10-CM

## 2023-08-04 DIAGNOSIS — E11.9 TYPE 2 DIABETES MELLITUS WITHOUT COMPLICATION, WITHOUT LONG-TERM CURRENT USE OF INSULIN (H): ICD-10-CM

## 2023-08-04 DIAGNOSIS — F81.9 LEARNING DISABILITY: ICD-10-CM

## 2023-08-04 DIAGNOSIS — R53.83 OTHER FATIGUE: Primary | ICD-10-CM

## 2023-08-04 LAB
ANION GAP SERPL CALCULATED.3IONS-SCNC: 13 MMOL/L (ref 7–15)
BASOPHILS # BLD AUTO: 0.1 10E3/UL (ref 0–0.2)
BASOPHILS NFR BLD AUTO: 1 %
BUN SERPL-MCNC: 15.3 MG/DL (ref 6–20)
CALCIUM SERPL-MCNC: 9.4 MG/DL (ref 8.6–10)
CHLORIDE SERPL-SCNC: 98 MMOL/L (ref 98–107)
CK SERPL-CCNC: 177 U/L (ref 39–308)
CREAT SERPL-MCNC: 0.66 MG/DL (ref 0.67–1.17)
DEPRECATED HCO3 PLAS-SCNC: 22 MMOL/L (ref 22–29)
EOSINOPHIL # BLD AUTO: 0.1 10E3/UL (ref 0–0.7)
EOSINOPHIL NFR BLD AUTO: 2 %
ERYTHROCYTE [DISTWIDTH] IN BLOOD BY AUTOMATED COUNT: 12 % (ref 10–15)
ERYTHROCYTE [SEDIMENTATION RATE] IN BLOOD BY WESTERGREN METHOD: 4 MM/HR (ref 0–20)
GFR SERPL CREATININE-BSD FRML MDRD: >90 ML/MIN/1.73M2
GLUCOSE SERPL-MCNC: 374 MG/DL (ref 70–99)
HCT VFR BLD AUTO: 45.1 % (ref 40–53)
HGB BLD-MCNC: 16.6 G/DL (ref 13.3–17.7)
IMM GRANULOCYTES # BLD: 0 10E3/UL
IMM GRANULOCYTES NFR BLD: 1 %
LYMPHOCYTES # BLD AUTO: 2.1 10E3/UL (ref 0.8–5.3)
LYMPHOCYTES NFR BLD AUTO: 33 %
MCH RBC QN AUTO: 28.7 PG (ref 26.5–33)
MCHC RBC AUTO-ENTMCNC: 36.8 G/DL (ref 31.5–36.5)
MCV RBC AUTO: 78 FL (ref 78–100)
MONOCYTES # BLD AUTO: 0.4 10E3/UL (ref 0–1.3)
MONOCYTES NFR BLD AUTO: 6 %
NEUTROPHILS # BLD AUTO: 3.8 10E3/UL (ref 1.6–8.3)
NEUTROPHILS NFR BLD AUTO: 58 %
PLATELET # BLD AUTO: 221 10E3/UL (ref 150–450)
POTASSIUM SERPL-SCNC: 4.1 MMOL/L (ref 3.4–5.3)
RBC # BLD AUTO: 5.78 10E6/UL (ref 4.4–5.9)
SODIUM SERPL-SCNC: 133 MMOL/L (ref 136–145)
WBC # BLD AUTO: 6.6 10E3/UL (ref 4–11)

## 2023-08-04 PROCEDURE — 82550 ASSAY OF CK (CPK): CPT

## 2023-08-04 PROCEDURE — 85025 COMPLETE CBC W/AUTO DIFF WBC: CPT

## 2023-08-04 PROCEDURE — 85652 RBC SED RATE AUTOMATED: CPT

## 2023-08-04 PROCEDURE — 80048 BASIC METABOLIC PNL TOTAL CA: CPT

## 2023-08-04 PROCEDURE — 99214 OFFICE O/P EST MOD 30 MIN: CPT

## 2023-08-04 PROCEDURE — 36415 COLL VENOUS BLD VENIPUNCTURE: CPT

## 2023-08-04 RX ORDER — GLIMEPIRIDE 2 MG/1
2 TABLET ORAL
Qty: 60 TABLET | Refills: 0 | Status: SHIPPED | OUTPATIENT
Start: 2023-08-04 | End: 2023-09-11

## 2023-08-04 ASSESSMENT — ENCOUNTER SYMPTOMS
FEVER: 1
FATIGUE: 1

## 2023-08-04 NOTE — PATIENT INSTRUCTIONS
Try taking prilosec one hour before meals     Autoimmune tests are in you can get those     Can schedule lab appointment, or get labs done today

## 2023-08-04 NOTE — PROGRESS NOTES
Assessment & Plan     Other fatigue  Patient complaining of increasing fatigue that has been ongoing for several months. I believe it could be related to uncontrolled diabetes, but can place order for sleep study as well. During interview patient frequently changes topics and is difficult to understand what his chief complaint is  - Adult Sleep Eval & Management Referral; Future  - UA with Microscopic reflex to Culture - lab collect; Future    Benign essential hypertension  Update lab  - Basic metabolic panel  (Ca, Cl, CO2, Creat, Gluc, K, Na, BUN)    Gastroesophageal reflux disease with esophagitis without hemorrhage  Patient states that he gets pain in esophageal region after big meal. Try prilosec    Type 2 diabetes mellitus without complication, without long-term current use of insulin (H)  Will start amyrel patient did not tolerate glipizide. Last A1c was 9.0 and patient is non-compliant. I think a lot of the symptoms he is having such as fatigue could be related  - CK total  - CBC with platelets and differential  - ESR: Erythrocyte sedimentation rate  - glimepiride (AMARYL) 2 MG tablet; Take 1 tablet (2 mg) by mouth every morning (before breakfast)  - Primary Care - Care Coordination Referral; Future      Methamphetamine abuse (H)  Previously positive. Patient is   - Urine Drugs of Abuse Screen; Future    Multiple joint pain  Patient states that he has pain everywhere. He will not give further information regarding where or when or what causes this. Will check for autoimmune cause and refer to rheum   - Anti Nuclear Annie IgG by IFA with Reflex; Future  - Rheumatoid factor; Future  - Cyclic Citrullinated Peptide Antibody IgG; Future  - Adult Rheumatology  Referral; Future    Learning disability  Complicates interview      Deacon Bernard NP  [facility] is a 53 year old, presenting for the following health issues:  Fever, Fatigue, and Chest Pain (He had  "chest pain last night. It took about 3-4 hours to get it away.)        8/4/2023    11:24 AM   Additional Questions   Roomed by Madonna Burr MA   Accompanied by Himself       History of Present Illness       Back Pain:  He presents for follow up of back pain. Patient's back pain is a recurring problem.  Location of back pain:  Other  Description of back pain: burning, shooting, stabbing and other  Back pain spreads: right foot and left foot    Since patient first noticed back pain, pain is: unchanged  Does back pain interfere with his job:  Yes       Diabetes:   He presents for follow up of diabetes.    He is not checking blood glucose.         He has no concerns regarding his diabetes at this time.   He is not experiencing numbness or burning in feet, excessive thirst, blurry vision, weight changes or redness, sores or blisters on feet. The patient has not had a diabetic eye exam in the last 12 months.          Headaches:   Since the patient's last clinic visit, headaches are: no change  The patient is getting headaches:  3-4 times a day  He is able to do normal daily activities when he has a migraine.  The patient is taking the following rescue/relief medications:  No rescue/relief medications   Patient states \"I get no relief\" from the rescue/relief medications.   The patient is taking the following medications to prevent migraines:  No medications to prevent migraines  In the past 4 weeks, the patient has gone to an Urgent Care or Emergency Room 0 times times due to headaches.    Reason for visit:  Seeing    He eats 2-3 servings of fruits and vegetables daily.He consumes 6 sweetened beverage(s) daily.He exercises with enough effort to increase his heart rate 9 or less minutes per day.  He exercises with enough effort to increase his heart rate 3 or less days per week.   He is taking medications regularly.     Has not taken medication for about one week.    He is feeling fatigue and unable to get out of " "bed.    Feels that he tightens up and cannot move- laying still makes it go away    Patient declines rheumatology referral     Review of Systems   Constitutional:  Positive for fatigue and fever.      Constitutional, HEENT, cardiovascular, pulmonary, gi and gu systems are negative, except as otherwise noted.      Objective    BP (!) 150/86   Pulse 74   Temp 98.5  F (36.9  C) (Oral)   Resp 14   Ht 1.88 m (6' 2\")   Wt 121.8 kg (268 lb 9.6 oz)   SpO2 98%   BMI 34.49 kg/m    Body mass index is 34.49 kg/m .  Physical Exam   GENERAL: alert and no distress  EYES: Eyes grossly normal to inspection  RESP: lungs clear to auscultation - no rales, rhonchi or wheezes  CV: regular rate and rhythm, normal S1 S2, no S3 or S4, no murmur, click or rub, no peripheral edema and peripheral pulses strong  MS: no gross musculoskeletal defects noted, no edema  SKIN: no suspicious lesions or rashes  NEURO: Normal strength and tone, mentation intact and speech normal  PSYCH: mentation appears normal, affect normal/bright          "

## 2023-08-07 ENCOUNTER — PATIENT OUTREACH (OUTPATIENT)
Dept: CARE COORDINATION | Facility: CLINIC | Age: 53
End: 2023-08-07

## 2023-08-07 NOTE — PROGRESS NOTES
Clinic Care Coordination Contact    [facility] received call/vm from patient 8/4/2023 requesting return call. Per chart review, patient spoke with CC CHW on this day and [facility] assessment is scheduled for 8/11/2023 at 9:00am. [facility] will attempt to reach patient on 8/11/2023 to complete enrollment in CC program.    FARHAN Hernandez/[facility]  Social Work Care Coordinator  [facility] and [place]  Phone: [phone]

## 2023-08-07 NOTE — PROGRESS NOTES
Clinic Care Coordination Contact  Community Health Worker Initial Outreach    CHW Initial Information Gathering:  Referral Source: PCP       Patient accepts CC: Yes. Patient scheduled for assessment with CC SW on 8/11/2023 at 9:00 AM. Patient noted desire to discuss Establish Care with PCP, Diabetes Education, SW needs.     8-7, CHW:  CHW was able to connect with the Patient and introduce self/care coordination. Patient would like to schedule an appointment with CC SW Whitney as they have been in communication with each other for Care Coordination.   Patient would like to be put into contact with a provider that just focuses on Diabetes. Writer provided education regarding Diabetes Education; to be reviewed at scheduled CC SW appointment.   Patient asked the Writer to cancel labs, reviewed the importance of labs and the right to refuse certain labs. Asked if he would like a nurse to reach out to educate prior to cancelling - patient refuses. Writer cancelled labs schedule for today at 2:15 PM Per Patient request.  Patient shares that he faces harassment from his coworkers, however, patient did not elaborate and CHW unable to ask clarifying questions due to the Patient frequently changing topics.       Geno EMERY [facility]  Community Health Worker  [place] [place] [facility]  Clinic Care Coordination  [phone]

## 2023-08-07 NOTE — TELEPHONE ENCOUNTER
Reason for call:  Patient has questions about WC forms.    Home number on file [phone] (home)

## 2023-08-11 ENCOUNTER — PATIENT OUTREACH (OUTPATIENT)
Dept: NURSING | Facility: CLINIC | Age: 53
End: 2023-08-11
Payer: COMMERCIAL

## 2023-08-11 SDOH — ECONOMIC STABILITY: TRANSPORTATION INSECURITY
IN THE PAST 12 MONTHS, HAS LACK OF TRANSPORTATION KEPT YOU FROM MEETINGS, WORK, OR FROM GETTING THINGS NEEDED FOR DAILY LIVING?: NO

## 2023-08-11 SDOH — ECONOMIC STABILITY: FOOD INSECURITY: WITHIN THE PAST 12 MONTHS, THE FOOD YOU BOUGHT JUST DIDN'T LAST AND YOU DIDN'T HAVE MONEY TO GET MORE.: NEVER TRUE

## 2023-08-11 SDOH — ECONOMIC STABILITY: INCOME INSECURITY: IN THE LAST 12 MONTHS, WAS THERE A TIME WHEN YOU WERE NOT ABLE TO PAY THE MORTGAGE OR RENT ON TIME?: NO

## 2023-08-11 SDOH — ECONOMIC STABILITY: TRANSPORTATION INSECURITY
IN THE PAST 12 MONTHS, HAS THE LACK OF TRANSPORTATION KEPT YOU FROM MEDICAL APPOINTMENTS OR FROM GETTING MEDICATIONS?: NO

## 2023-08-11 SDOH — ECONOMIC STABILITY: FOOD INSECURITY: WITHIN THE PAST 12 MONTHS, YOU WORRIED THAT YOUR FOOD WOULD RUN OUT BEFORE YOU GOT MONEY TO BUY MORE.: NEVER TRUE

## 2023-08-11 ASSESSMENT — ACTIVITIES OF DAILY LIVING (ADL): DEPENDENT_IADLS:: INDEPENDENT

## 2023-08-11 ASSESSMENT — SOCIAL DETERMINANTS OF HEALTH (SDOH): HOW HARD IS IT FOR YOU TO PAY FOR THE VERY BASICS LIKE FOOD, HOUSING, MEDICAL CARE, AND HEATING?: SOMEWHAT HARD
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
## 2023-08-14 ENCOUNTER — HOSPITAL ENCOUNTER (OUTPATIENT)
Facility: CLINIC | Age: 53
Setting detail: OBSERVATION
Discharge: HOME | End: 2023-08-15
Attending: EMERGENCY MEDICINE | Admitting: INTERNAL MEDICINE
Payer: COMMERCIAL

## 2023-08-14 DIAGNOSIS — R00.2 PALPITATIONS: ICD-10-CM

## 2023-08-14 DIAGNOSIS — E11.10 DIABETIC KETOACIDOSIS WITHOUT COMA ASSOCIATED WITH TYPE 2 DIABETES MELLITUS (H): ICD-10-CM

## 2023-08-14 DIAGNOSIS — D72.829 LEUKOCYTOSIS, UNSPECIFIED TYPE: ICD-10-CM

## 2023-08-14 LAB
ALBUMIN UR-MCNC: NEGATIVE MG/DL
AMPHETAMINES UR QL SCN: NORMAL
ANION GAP SERPL CALCULATED.3IONS-SCNC: 17 MMOL/L (ref 7–15)
APPEARANCE UR: CLEAR
B-OH-BUTYR SERPL-SCNC: 0.8 MMOL/L
BARBITURATES UR QL SCN: NORMAL
BASE EXCESS BLDV CALC-SCNC: -1.8 MMOL/L (ref -7.7–1.9)
BASOPHILS # BLD AUTO: 0 10E3/UL (ref 0–0.2)
BASOPHILS NFR BLD AUTO: 0 %
BENZODIAZ UR QL SCN: NORMAL
BILIRUB UR QL STRIP: NEGATIVE
BUN SERPL-MCNC: 15.1 MG/DL (ref 6–20)
BZE UR QL SCN: NORMAL
CALCIUM SERPL-MCNC: 9.5 MG/DL (ref 8.6–10)
CANNABINOIDS UR QL SCN: NORMAL
CHLORIDE SERPL-SCNC: 96 MMOL/L (ref 98–107)
COLOR UR AUTO: ABNORMAL
CREAT SERPL-MCNC: 0.65 MG/DL (ref 0.51–1.17)
DEPRECATED HCO3 PLAS-SCNC: 19 MMOL/L (ref 22–29)
EOSINOPHIL # BLD AUTO: 0 10E3/UL (ref 0–0.7)
EOSINOPHIL NFR BLD AUTO: 0 %
ERYTHROCYTE [DISTWIDTH] IN BLOOD BY AUTOMATED COUNT: 12.2 % (ref 10–15)
GFR SERPL CREATININE-BSD FRML MDRD: >90 ML/MIN/1.73M2
GLUCOSE BLDC GLUCOMTR-MCNC: 243 MG/DL (ref 70–99)
GLUCOSE BLDC GLUCOMTR-MCNC: 252 MG/DL (ref 70–99)
GLUCOSE BLDC GLUCOMTR-MCNC: 254 MG/DL (ref 70–99)
GLUCOSE BLDC GLUCOMTR-MCNC: 254 MG/DL (ref 70–99)
GLUCOSE BLDC GLUCOMTR-MCNC: 295 MG/DL (ref 70–99)
GLUCOSE BLDC GLUCOMTR-MCNC: 297 MG/DL (ref 70–99)
GLUCOSE BLDC GLUCOMTR-MCNC: 336 MG/DL (ref 70–99)
GLUCOSE BLDC GLUCOMTR-MCNC: 342 MG/DL (ref 70–99)
GLUCOSE SERPL-MCNC: 428 MG/DL (ref 70–99)
GLUCOSE UR STRIP-MCNC: >=1000 MG/DL
HBA1C MFR BLD: 8.8 %
HCO3 BLDV-SCNC: 23 MMOL/L (ref 21–28)
HCT VFR BLD AUTO: 44.5 % (ref 35–53)
HGB BLD-MCNC: 16.3 G/DL (ref 11.7–17.7)
HGB UR QL STRIP: NEGATIVE
IMM GRANULOCYTES # BLD: 0.1 10E3/UL
IMM GRANULOCYTES NFR BLD: 1 %
KETONES UR STRIP-MCNC: 20 MG/DL
LEUKOCYTE ESTERASE UR QL STRIP: NEGATIVE
LYMPHOCYTES # BLD AUTO: 0.9 10E3/UL (ref 0.8–5.3)
LYMPHOCYTES NFR BLD AUTO: 7 %
MAGNESIUM SERPL-MCNC: 1.8 MG/DL (ref 1.7–2.3)
MCH RBC QN AUTO: 29.2 PG (ref 26.5–33)
MCHC RBC AUTO-ENTMCNC: 36.6 G/DL (ref 31.5–36.5)
MCV RBC AUTO: 80 FL (ref 78–100)
MONOCYTES # BLD AUTO: 0.5 10E3/UL (ref 0–1.3)
MONOCYTES NFR BLD AUTO: 4 %
MUCOUS THREADS #/AREA URNS LPF: PRESENT /LPF
NEUTROPHILS # BLD AUTO: 11.5 10E3/UL (ref 1.6–8.3)
NEUTROPHILS NFR BLD AUTO: 88 %
NITRATE UR QL: NEGATIVE
NRBC # BLD AUTO: 0 10E3/UL
NRBC BLD AUTO-RTO: 0 /100
O2/TOTAL GAS SETTING VFR VENT: 98 %
OPIATES UR QL SCN: NORMAL
PCO2 BLDV: 38 MM HG (ref 40–50)
PCP QUAL URINE (ROCHE): NORMAL
PH BLDV: 7.39 [PH] (ref 7.32–7.43)
PH UR STRIP: 5 [PH] (ref 5–7)
PLATELET # BLD AUTO: 227 10E3/UL (ref 150–450)
PO2 BLDV: 39 MM HG (ref 25–47)
POTASSIUM SERPL-SCNC: 4.3 MMOL/L (ref 3.4–5.3)
RBC # BLD AUTO: 5.59 10E6/UL (ref 3.8–5.9)
RBC URINE: <1 /HPF
SODIUM SERPL-SCNC: 132 MMOL/L (ref 136–145)
SP GR UR STRIP: 1.02 (ref 1–1.03)
SQUAMOUS EPITHELIAL: <1 /HPF
TROPONIN T SERPL HS-MCNC: 7 NG/L
TSH SERPL DL<=0.005 MIU/L-ACNC: 2.36 UIU/ML (ref 0.3–4.2)
UROBILINOGEN UR STRIP-MCNC: NORMAL MG/DL
WBC # BLD AUTO: 13 10E3/UL (ref 4–11)
WBC URINE: <1 /HPF

## 2023-08-14 PROCEDURE — 258N000003 HC RX IP 258 OP 636: Performed by: EMERGENCY MEDICINE

## 2023-08-14 PROCEDURE — 83036 HEMOGLOBIN GLYCOSYLATED A1C: CPT | Performed by: INTERNAL MEDICINE

## 2023-08-14 PROCEDURE — 82803 BLOOD GASES ANY COMBINATION: CPT | Performed by: EMERGENCY MEDICINE

## 2023-08-14 PROCEDURE — 96360 HYDRATION IV INFUSION INIT: CPT

## 2023-08-14 PROCEDURE — 84484 ASSAY OF TROPONIN QUANT: CPT | Performed by: EMERGENCY MEDICINE

## 2023-08-14 PROCEDURE — 96365 THER/PROPH/DIAG IV INF INIT: CPT

## 2023-08-14 PROCEDURE — 80307 DRUG TEST PRSMV CHEM ANLYZR: CPT | Performed by: EMERGENCY MEDICINE

## 2023-08-14 PROCEDURE — 80048 BASIC METABOLIC PNL TOTAL CA: CPT | Performed by: EMERGENCY MEDICINE

## 2023-08-14 PROCEDURE — 81001 URINALYSIS AUTO W/SCOPE: CPT | Mod: XU | Performed by: EMERGENCY MEDICINE

## 2023-08-14 PROCEDURE — 120N000013 HC R&B IMCU

## 2023-08-14 PROCEDURE — 36415 COLL VENOUS BLD VENIPUNCTURE: CPT | Performed by: EMERGENCY MEDICINE

## 2023-08-14 PROCEDURE — 250N000009 HC RX 250: Performed by: EMERGENCY MEDICINE

## 2023-08-14 PROCEDURE — 96361 HYDRATE IV INFUSION ADD-ON: CPT

## 2023-08-14 PROCEDURE — 99223 1ST HOSP IP/OBS HIGH 75: CPT | Performed by: INTERNAL MEDICINE

## 2023-08-14 PROCEDURE — 93005 ELECTROCARDIOGRAM TRACING: CPT

## 2023-08-14 PROCEDURE — 99285 EMERGENCY DEPT VISIT HI MDM: CPT | Mod: 25

## 2023-08-14 PROCEDURE — 83735 ASSAY OF MAGNESIUM: CPT | Performed by: EMERGENCY MEDICINE

## 2023-08-14 PROCEDURE — 96366 THER/PROPH/DIAG IV INF ADDON: CPT

## 2023-08-14 PROCEDURE — 82962 GLUCOSE BLOOD TEST: CPT

## 2023-08-14 PROCEDURE — 85025 COMPLETE CBC W/AUTO DIFF WBC: CPT | Performed by: EMERGENCY MEDICINE

## 2023-08-14 PROCEDURE — 82010 KETONE BODYS QUAN: CPT | Performed by: EMERGENCY MEDICINE

## 2023-08-14 PROCEDURE — 84443 ASSAY THYROID STIM HORMONE: CPT | Performed by: EMERGENCY MEDICINE

## 2023-08-14 PROCEDURE — 250N000011 HC RX IP 250 OP 636: Mod: JZ | Performed by: EMERGENCY MEDICINE

## 2023-08-14 RX ORDER — SODIUM CHLORIDE AND POTASSIUM CHLORIDE 150; 450 MG/100ML; MG/100ML
INJECTION, SOLUTION INTRAVENOUS CONTINUOUS
Status: DISCONTINUED | OUTPATIENT
Start: 2023-08-14 | End: 2023-08-15

## 2023-08-14 RX ORDER — NICOTINE POLACRILEX 4 MG
15-30 LOZENGE BUCCAL
Status: DISCONTINUED | OUTPATIENT
Start: 2023-08-14 | End: 2023-08-14

## 2023-08-14 RX ORDER — DEXTROSE MONOHYDRATE 25 G/50ML
25-50 INJECTION, SOLUTION INTRAVENOUS
Status: DISCONTINUED | OUTPATIENT
Start: 2023-08-14 | End: 2023-08-15 | Stop reason: HOSPADM

## 2023-08-14 RX ORDER — DEXTROSE MONOHYDRATE, SODIUM CHLORIDE, AND POTASSIUM CHLORIDE 50; 1.49; 4.5 G/1000ML; G/1000ML; G/1000ML
INJECTION, SOLUTION INTRAVENOUS CONTINUOUS
Status: DISCONTINUED | OUTPATIENT
Start: 2023-08-14 | End: 2023-08-15

## 2023-08-14 RX ORDER — NICOTINE POLACRILEX 4 MG
15-30 LOZENGE BUCCAL
Status: DISCONTINUED | OUTPATIENT
Start: 2023-08-14 | End: 2023-08-15 | Stop reason: HOSPADM

## 2023-08-14 RX ORDER — DEXTROSE MONOHYDRATE 25 G/50ML
25-50 INJECTION, SOLUTION INTRAVENOUS
Status: DISCONTINUED | OUTPATIENT
Start: 2023-08-14 | End: 2023-08-14

## 2023-08-14 RX ORDER — POTASSIUM CHLORIDE 7.45 MG/ML
10 INJECTION INTRAVENOUS ONCE
Status: COMPLETED | OUTPATIENT
Start: 2023-08-14 | End: 2023-08-14

## 2023-08-14 RX ORDER — SODIUM CHLORIDE 9 MG/ML
1000 INJECTION, SOLUTION INTRAVENOUS CONTINUOUS
Status: DISCONTINUED | OUTPATIENT
Start: 2023-08-14 | End: 2023-08-15

## 2023-08-14 RX ORDER — LIDOCAINE 40 MG/G
CREAM TOPICAL
Status: DISCONTINUED | OUTPATIENT
Start: 2023-08-14 | End: 2023-08-15 | Stop reason: HOSPADM

## 2023-08-14 RX ADMIN — SODIUM CHLORIDE 1000 ML: 0.9 INJECTION, SOLUTION INTRAVENOUS at 21:33

## 2023-08-14 RX ADMIN — SODIUM CHLORIDE 1000 ML: 9 INJECTION, SOLUTION INTRAVENOUS at 18:29

## 2023-08-14 RX ADMIN — INSULIN HUMAN 5.5 UNITS/HR: 1 INJECTION, SOLUTION INTRAVENOUS at 20:41

## 2023-08-14 RX ADMIN — POTASSIUM CHLORIDE 10 MEQ: 7.46 INJECTION, SOLUTION INTRAVENOUS at 20:42

## 2023-08-14 RX ADMIN — SODIUM CHLORIDE 1000 ML: 9 INJECTION, SOLUTION INTRAVENOUS at 20:43

## 2023-08-14 ASSESSMENT — ACTIVITIES OF DAILY LIVING (ADL)
ADLS_ACUITY_SCORE: 35
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
## 2023-08-14 NOTE — ED PROVIDER NOTES
History     Chief Complaint:  Anxiety       The history is provided by the patient.      Scout Perez is a 53 year old man with history of type II diabetes, hypertension, and learning disability who presents alone for anxiety.  Scout has had difficult time at work and in relation to his boss over the last year.  He also had a disagreement with his mother recently.  He twisted his right ankle last week which is an additional source of stress. He was very anxious, which is not typical for him, during the day.  However, after he was home and relaxed he became concerned because his heart was still racing.  He had no associated chest pain, shortness of breath, diaphoresis, nausea, or vomiting.  He denies suicidal ideation, homicidal ideation, and drug and alcohol use.  Notably, paramedics did find him to be tachycardic and hypertensive and administered risperidone.    Independent Historian:    As above    Review of External Notes:  Primary care visit 8/4/2023.  Poor compliance with medications as documented.    Medications:    glimepiride (AMARYL) 2 MG tablet  lisinopril (ZESTRIL) 10 MG tablet    Past Medical History:    Past Medical History:   Diagnosis Date    Benign essential hypertension     Learning disability     Type 2 diabetes mellitus (H)      Physical Exam   Patient Vitals for the past 24 hrs:   BP Temp Pulse Resp SpO2   08/14/23 2117 134/84 -- 104 -- 98 %   08/14/23 1745 (!) 142/90 98.2  F (36.8  C) (!) 127 20 96 %      Physical Exam  General: Well-developed and well-nourished. Well appearing middle-aged  man. Cooperative.  Head:  Atraumatic.  Eyes:  Conjunctivae, lids, and sclerae are normal.  ENT:    Normal nose. Moist mucous membranes.  Neck:  Supple. Normal range of motion.  CV:  Tachycardic rate and regular rhythm. Normal heart sounds with no murmurs, rubs, or gallops detected.  Resp:  No respiratory distress. Clear to auscultation bilaterally without decreased breath sounds, wheezing,  rales, or rhonchi.  GI:  Soft. Non-distended. Non-tender.    MS:  Normal ROM. No bilateral lower extremity edema.  No bony tenderness or edema to the right ankle.  Skin:  Warm. Non-diaphoretic. No pallor.  Neuro:  Awake. A&Ox3. Normal strength.  Psych: Anxious mood and affect. Normal speech. No suicidal thought content.  Vitals reviewed.      Emergency Department Course   EKG  Indication: tachycardia  Time: 1801  Rate 124 bpm. WY interval 164. QRS duration 84. QT/QTc 312/448.   Sinus tachycardia  Possible left atrial enlargement  Borderline ECG  No acute ST changes.  No change as compared to prior, dated 6/20/23.    Laboratory:  Labs Ordered and Resulted from Time of ED Arrival to Time of ED Departure   BASIC METABOLIC PANEL - Abnormal       Result Value    Sodium 132 (*)     Potassium 4.3      Chloride 96 (*)     Carbon Dioxide (CO2) 19 (*)     Anion Gap 17 (*)     Urea Nitrogen 15.1      Creatinine 0.65      Calcium 9.5      Glucose 428 (*)     GFR Estimate >90     CBC WITH PLATELETS AND DIFFERENTIAL - Abnormal    WBC Count 13.0 (*)     RBC Count 5.59      Hemoglobin 16.3      Hematocrit 44.5      MCV 80      MCH 29.2      MCHC 36.6 (*)     RDW 12.2      Platelet Count 227      % Neutrophils 88      % Lymphocytes 7      % Monocytes 4      % Eosinophils 0      % Basophils 0      % Immature Granulocytes 1      NRBCs per 100 WBC 0      Absolute Neutrophils 11.5 (*)     Absolute Lymphocytes 0.9      Absolute Monocytes 0.5      Absolute Eosinophils 0.0      Absolute Basophils 0.0      Absolute Immature Granulocytes 0.1      Absolute NRBCs 0.0     ROUTINE UA WITH MICROSCOPIC REFLEX TO CULTURE - Abnormal    Color Urine Light Yellow      Appearance Urine Clear      Glucose Urine >=1000 (*)     Bilirubin Urine Negative      Ketones Urine 20 (*)     Specific Gravity Urine 1.017      Blood Urine Negative      pH Urine 5.0      Protein Albumin Urine Negative      Urobilinogen Urine Normal      Nitrite Urine Negative       Leukocyte Esterase Urine Negative      Mucus Urine Present (*)     RBC Urine <1      WBC Urine <1      Squamous Epithelials Urine <1     KETONE BETA-HYDROXYBUTYRATE QUANTITATIVE, RAPID - Abnormal    Ketone (Beta-Hydroxybutyrate) Quantitative 0.80 (*)    BLOOD GAS VENOUS - Abnormal    pH Venous 7.39      pCO2 Venous 38 (*)     pO2 Venous 39      Bicarbonate Venous 23      Base Excess/Deficit (+/-) -1.8      FIO2 98     GLUCOSE BY METER - Abnormal    GLUCOSE BY METER POCT 342 (*)    GLUCOSE BY METER - Abnormal    GLUCOSE BY METER POCT 336 (*)    GLUCOSE BY METER - Abnormal    GLUCOSE BY METER POCT 297 (*)    GLUCOSE BY METER - Abnormal    GLUCOSE BY METER POCT 295 (*)    GLUCOSE BY METER - Abnormal    GLUCOSE BY METER POCT 254 (*)    HEMOGLOBIN A1C - Abnormal    Hemoglobin A1C 8.8 (*)    GLUCOSE BY METER - Abnormal    GLUCOSE BY METER POCT 252 (*)    GLUCOSE BY METER - Abnormal    GLUCOSE BY METER POCT 243 (*)    TROPONIN T, HIGH SENSITIVITY - Normal    Troponin T, High Sensitivity 7     MAGNESIUM - Normal    Magnesium 1.8     TSH WITH FREE T4 REFLEX - Normal    TSH 2.36     DRUG ABUSE SCREEN 77 URINE (FL, RH, SH) - Normal    Amphetamines Urine Screen Negative      Barbituates Urine Screen Negative      Benzodiazepine Urine Screen Negative      Cannabinoids Urine Screen Negative      Opiates Urine Screen Negative      PCP Urine Screen Negative      Cocaine Urine Screen Negative        Emergency Department Course & Assessments:    Interventions:  Medications   sodium chloride 0.9% infusion (1,000 mLs Intravenous $New Bag 8/14/23 2133)   insulin regular (MYXREDLIN) infusion ( Intravenous initiated 2041)   0.9% sodium chloride BOLUS (1,000 mLs Intravenous Stopped 8/14/23 2049)   0.9% sodium chloride BOLUS (1,000 mLs Intravenous $New Bag 8/14/23 2043)   potassium chloride 10 mEq in 100 mL sterile water infusion (10 mEq Intravenous $New Bag 8/14/23 2042)      Assessments:  1920 I reassessed the patient who is feeling a  little bit more relaxed.  He remains tachycardic.  2010 I updated the patient on plan for admission.     Independent Interpretation (X-rays, CTs, rhythm strip):  Not applicable    Consultations/Discussion of Management or Tests:  2022 I spoke with Dr. Ge, hospitalist.    Social Determinants of Health affecting care:  Employed  Interpersonal discord  Learning disability  Documented medication noncompliance    Disposition:  The patient was admitted to the hospital under the care of Dr. Ge.     Impression & Plan    Medical Decision Making:  Scout is a 53-year-old man with type 2 diabetes who is presenting primarily with palpitations.  He tells a long story about difficulties he is having at work and with his mother and a recent ankle sprain.  However ultimately he called paramedics because he was having palpitations at home even after he calmed down.  He denies any other concerns and appears well on exam although he is tachycardic.    EKG reveals sinus tachycardia to 124 bpm without ischemic changes.  Troponin is normal.  TSH is normal.  UDS is negative.  The cause for his tachycardia is likely volume depletion secondary to DKA with hyperglycemia to 428 with anion gap of 17 and bicarb of 19.  Ketones are elevated at 0.8.  There is pseudohyponatremia to 132 but no other electrolyte derangements.  I strongly suspect this to be due to medication noncompliance as hemoglobin A1c is elevated at 8.8%.  He does have leukocytosis to 13.  He does not describe any infectious symptoms and urinalysis is without UTI.    He was given 2 L of IV fluids with improved heart rate.  Insulin and normal saline infusions were initiated.  He was also given potassium IV.  I updated him on findings and plan for admission and answered all his questions.  He verbalized understanding is amenable.  I discussed the patient's case with Dr. Ge, hospitalist, who accepts admission and has no further orders.    Diagnosis:     ICD-10-CM    1. Diabetic ketoacidosis without coma associated with type 2 diabetes mellitus (H)  E11.10       2. Leukocytosis, unspecified type  D72.829       3. Palpitations  R00.2            8/14/2023   Katherine Forman MD [place], Kylie S, MD  08/15/23 1304

## 2023-08-14 NOTE — ED TRIAGE NOTES
Pt arrived via ambulance from home having increased stress and unable to calm down today.  EMS found him to be tachy and HTN.  4 of risperidone given.

## 2023-08-15 VITALS
OXYGEN SATURATION: 100 % | RESPIRATION RATE: 9 BRPM | DIASTOLIC BLOOD PRESSURE: 85 MMHG | HEART RATE: 90 BPM | SYSTOLIC BLOOD PRESSURE: 154 MMHG | TEMPERATURE: 98.1 F

## 2023-08-15 PROBLEM — D72.829 LEUKOCYTOSIS, UNSPECIFIED TYPE: Status: ACTIVE | Noted: 2023-08-15

## 2023-08-15 PROBLEM — E11.10 DIABETIC KETOACIDOSIS WITHOUT COMA ASSOCIATED WITH TYPE 2 DIABETES MELLITUS (H): Status: ACTIVE | Noted: 2023-08-15

## 2023-08-15 PROBLEM — R00.2 PALPITATIONS: Status: ACTIVE | Noted: 2023-08-15

## 2023-08-15 LAB
ANION GAP SERPL CALCULATED.3IONS-SCNC: 12 MMOL/L (ref 7–15)
B-OH-BUTYR SERPL-SCNC: <0.18 MMOL/L
B-OH-BUTYR SERPL-SCNC: <0.18 MMOL/L
BUN SERPL-MCNC: 11 MG/DL (ref 6–20)
CALCIUM SERPL-MCNC: 8.9 MG/DL (ref 8.6–10)
CHLORIDE SERPL-SCNC: 106 MMOL/L (ref 98–107)
CREAT SERPL-MCNC: 0.64 MG/DL (ref 0.67–1.17)
DEPRECATED HCO3 PLAS-SCNC: 22 MMOL/L (ref 22–29)
ERYTHROCYTE [DISTWIDTH] IN BLOOD BY AUTOMATED COUNT: 12.2 % (ref 10–15)
GFR SERPL CREATININE-BSD FRML MDRD: >90 ML/MIN/1.73M2
GLUCOSE BLDC GLUCOMTR-MCNC: 129 MG/DL (ref 70–99)
GLUCOSE BLDC GLUCOMTR-MCNC: 133 MG/DL (ref 70–99)
GLUCOSE BLDC GLUCOMTR-MCNC: 146 MG/DL (ref 70–99)
GLUCOSE BLDC GLUCOMTR-MCNC: 148 MG/DL (ref 70–99)
GLUCOSE BLDC GLUCOMTR-MCNC: 154 MG/DL (ref 70–99)
GLUCOSE BLDC GLUCOMTR-MCNC: 157 MG/DL (ref 70–99)
GLUCOSE BLDC GLUCOMTR-MCNC: 157 MG/DL (ref 70–99)
GLUCOSE BLDC GLUCOMTR-MCNC: 167 MG/DL (ref 70–99)
GLUCOSE BLDC GLUCOMTR-MCNC: 168 MG/DL (ref 70–99)
GLUCOSE BLDC GLUCOMTR-MCNC: 173 MG/DL (ref 70–99)
GLUCOSE BLDC GLUCOMTR-MCNC: 177 MG/DL (ref 70–99)
GLUCOSE BLDC GLUCOMTR-MCNC: 194 MG/DL (ref 70–99)
GLUCOSE BLDC GLUCOMTR-MCNC: 258 MG/DL (ref 70–99)
GLUCOSE SERPL-MCNC: 155 MG/DL (ref 70–99)
HCT VFR BLD AUTO: 41.3 % (ref 40–53)
HGB BLD-MCNC: 14.5 G/DL (ref 13.3–17.7)
MAGNESIUM SERPL-MCNC: 2.2 MG/DL (ref 1.7–2.3)
MCH RBC QN AUTO: 28.9 PG (ref 26.5–33)
MCHC RBC AUTO-ENTMCNC: 35.1 G/DL (ref 31.5–36.5)
MCV RBC AUTO: 82 FL (ref 78–100)
PLATELET # BLD AUTO: 213 10E3/UL (ref 150–450)
POTASSIUM SERPL-SCNC: 4.1 MMOL/L (ref 3.4–5.3)
RBC # BLD AUTO: 5.01 10E6/UL (ref 4.4–5.9)
SODIUM SERPL-SCNC: 140 MMOL/L (ref 136–145)
WBC # BLD AUTO: 6.4 10E3/UL (ref 4–11)

## 2023-08-15 PROCEDURE — 36415 COLL VENOUS BLD VENIPUNCTURE: CPT | Performed by: INTERNAL MEDICINE

## 2023-08-15 PROCEDURE — 96366 THER/PROPH/DIAG IV INF ADDON: CPT

## 2023-08-15 PROCEDURE — 99239 HOSP IP/OBS DSCHRG MGMT >30: CPT | Performed by: INTERNAL MEDICINE

## 2023-08-15 PROCEDURE — 85027 COMPLETE CBC AUTOMATED: CPT | Performed by: INTERNAL MEDICINE

## 2023-08-15 PROCEDURE — 82962 GLUCOSE BLOOD TEST: CPT

## 2023-08-15 PROCEDURE — G0378 HOSPITAL OBSERVATION PER HR: HCPCS

## 2023-08-15 PROCEDURE — 83735 ASSAY OF MAGNESIUM: CPT | Performed by: INTERNAL MEDICINE

## 2023-08-15 PROCEDURE — 250N000012 HC RX MED GY IP 250 OP 636 PS 637: Performed by: INTERNAL MEDICINE

## 2023-08-15 PROCEDURE — 82010 KETONE BODYS QUAN: CPT | Performed by: INTERNAL MEDICINE

## 2023-08-15 PROCEDURE — 96375 TX/PRO/DX INJ NEW DRUG ADDON: CPT

## 2023-08-15 PROCEDURE — 80048 BASIC METABOLIC PNL TOTAL CA: CPT | Performed by: INTERNAL MEDICINE

## 2023-08-15 PROCEDURE — 258N000003 HC RX IP 258 OP 636: Performed by: INTERNAL MEDICINE

## 2023-08-15 RX ORDER — LANCETS
EACH MISCELLANEOUS
Qty: 100 EACH | Refills: 6 | Status: SHIPPED | OUTPATIENT
Start: 2023-08-15

## 2023-08-15 RX ORDER — NICOTINE POLACRILEX 4 MG
15-30 LOZENGE BUCCAL
Status: DISCONTINUED | OUTPATIENT
Start: 2023-08-15 | End: 2023-08-15

## 2023-08-15 RX ORDER — DEXTROSE MONOHYDRATE 25 G/50ML
25-50 INJECTION, SOLUTION INTRAVENOUS
Status: DISCONTINUED | OUTPATIENT
Start: 2023-08-15 | End: 2023-08-15

## 2023-08-15 RX ADMIN — POTASSIUM CHLORIDE, DEXTROSE MONOHYDRATE AND SODIUM CHLORIDE: 150; 5; 450 INJECTION, SOLUTION INTRAVENOUS at 00:11

## 2023-08-15 RX ADMIN — INSULIN GLARGINE 16 UNITS: 100 INJECTION, SOLUTION SUBCUTANEOUS at 09:11

## 2023-08-15 RX ADMIN — INSULIN ASPART 3 UNITS: 100 INJECTION, SOLUTION INTRAVENOUS; SUBCUTANEOUS at 12:52

## 2023-08-15 ASSESSMENT — ACTIVITIES OF DAILY LIVING (ADL)
ADLS_ACUITY_SCORE: 40
ADLS_ACUITY_SCORE: 36
ADLS_ACUITY_SCORE: 35
ADLS_ACUITY_SCORE: 36

## 2023-08-15 NOTE — DISCHARGE SUMMARY
[facility]    Hospitalist Discharge Summary       Date of Admission:  8/14/2023  Date of Discharge:  8/15/2023  Discharging Provider: Silvino Brown MD      Discharge Diagnoses   Diabetic ketoacidosis  Uncontrolled type 2 diabetes    Follow-ups Needed After Discharge   Follow-up Appointments     Follow-up and recommended labs and tests       Follow up with primary care provider, within 7 days for hospital follow-   up.  No follow up labs or test are needed.          Hospital Course   Scout Perez is a 54yo M with PMH of uncontrolled type 2 diabetes who was admitted on 8/14/2023 with weakness, fatigue, palpitations, and found to be in diabetic ketoacidosis, with blood glucose 428, bicarb 19, anion gap 17. He was placed on an insulin drip and admitted, and his gap closed by the next morning.   Patient reports poor diabetes control, he reports sparse PCP visits, has never been on insulin. Hgb A1c have been between 8.8% and 10.6% since 2021, not lower. He also reports not adhering to a diabetic diet.   During this admission, patient was provided with diabetic education, and a new glucometer as he reports his old one was broken. He is started on Insulin Lantus 20 units daily to simplify his discharge regimen. He will continue on lisinopril and glipizide. He is recommended to establish care with PCP for diabetes and general medical management.    Addendum: Patient is afraid of needles and declined to start lantus. Will order metformin, patient may need additional oral antidiabetics at outpatient follow up.    Hepatic steatosis  Splenomegaly  Seen on CT. Patient denies any alcohol use, and LFTs are not elevated. Further evaluation recommend as outpatient, such as abdominal ultrasound.    Esophagitis  Mid and distal esophageal wall thickening seen on CT. Patient denies smoking. Consider further evaluation as an outpatient. Consider GI follow up but defer to PCP.    Clinically Significant  "Risk Factors Present on Admission                    # Hypertension: Noted on problem list     # DMII: A1C = 8.8 % (Ref range: <5.7 %) within past 6 months      # Obesity: Estimated body mass index is 34.49 kg/m  as calculated from the following:    Height as of 8/4/23: 1.88 m (6' 2\").    Weight as of 8/4/23: 121.8 kg (268 lb 9.6 oz).                Consultations This Hospital Stay   SOCIAL WORK IP CONSULT  CARE MANAGEMENT / SOCIAL WORK IP CONSULT    Code Status   Full Code    Time Spent on this Encounter   I, Silvino Brown, personally saw the patient today and spent approximately 40 minutes discharging this patient.       Silvino Brown MD  [facility]  ______________________________________________________________________    Physical Exam   Vital Signs: Temp: 98.1  F (36.7  C) Temp src: Oral BP: (!) 154/85 Pulse: 90   Resp: (!) 9 SpO2: 100 % O2 Device: None (Room air)    Weight: 0 lbs 0 oz    Constitutional: Male in NAD  Eyes: Nonicteric, normal ocular movements  HEENT: Normocephalic, atraumatic, oral mucosa moist  Respiratory: CTAB, no wheezing or crackles  Cardiovascular: RRR, normal S1/2, no m/r/g  GI: No organomegaly, normoactive bowel sounds, nontender, nondistended  Skin: No rashes  Musculoskeletal: Normal strength in UE and LE, moves all extremities  Neurologic: A&Ox3  Psychiatric: Appropriate affect and mood       Primary Care Physician   Perla Lyons    Discharge Disposition   Discharged to home  Condition at discharge: Stable    Significant Results and Procedures   Most Recent 3 CBC's:  Recent Labs   Lab Test 08/15/23  0553 08/14/23  1827 08/04/23  1045   WBC 6.4 13.0* 6.6   HGB 14.5 16.3 16.6   MCV 82 80 78    227 221     Most Recent 3 BMP's:  Recent Labs   Lab Test 08/15/23  1148 08/15/23  1050 08/15/23  1007 08/15/23  0617 08/15/23  0553 08/14/23  2043 08/14/23  1827 08/04/23  1045   NA  --   --   --   --  140  --  132* 133*   POTASSIUM  --   --   --   --  4.1 "  --  4.3 4.1   CHLORIDE  --   --   --   --  106  --  96* 98   CO2  --   --   --   --  22  --  19* 22   BUN  --   --   --   --  11.0  --  15.1 15.3   CR  --   --   --   --  0.64*  --  0.65 0.66*   ANIONGAP  --   --   --   --  12  --  17* 13   ARELY  --   --   --   --  8.9  --  9.5 9.4   * 173* 146*   < > 155*   < > 428* 374*    < > = values in this interval not displayed.     Most Recent 2 LFT's:  Recent Labs   Lab Test 06/21/23  0602 06/20/23  0921   AST 20 23   ALT 33 46   ALKPHOS 68 84   BILITOTAL 1.0 0.7     Most Recent 3 INR's:No lab results found.  Most Recent 3 Troponin's:No lab results found.  Most Recent 3 BNP's:  Recent Labs   Lab Test 06/20/23  0921   NTBNPI 313     Most Recent D-dimer:  Recent Labs   Lab Test 07/23/22 2058   DD 0.29     Most Recent Cholesterol Panel:  Recent Labs   Lab Test 05/26/23  1622   CHOL 218*   LDL 89   HDL 59   TRIG 348*       Results for orders placed or performed during the hospital encounter of 06/20/23   CT Aortic Survey w Contrast    Narrative    CT AORTIC SURVEY WITH CONTRAST June 20, 2023 12:36 PM    HISTORY: Abdominal pain with elevated white count and tachycardia.  Also has chest pain.    TECHNIQUE: CT scan obtained of the chest, abdomen, and pelvis with IV  contrast. Post contrast scanning performed during the arterial phase.  CT chest also obtained without IV contrast. 150mL Isovue-370 IV  injected. Sagittal and coronal reformatted images acquired from the  source post contrast data. Radiation dose for this scan was reduced  using automated exposure control, adjustment of the mA and/or kV  according to patient size, or iterative reconstruction technique.    COMPARISON: Chest x-ray on 8/13/2022.    FINDINGS:  Thoracic and abdominal aorta: No evidence of thoracolumbar abdominal  aortic aneurysm or dissection. Mild atherosclerotic vascular  calcification of the thoracoabdominal aorta.    Other vascular: The major branches of the proximal abdominal aorta  are  patent.    Chest/mediastinum: No cardiomegaly or significant pericardial  effusion. No significant mediastinal, hilar or axillary  lymphadenopathy. Moderate atherosclerotic vascular calcification of  the coronary arteries. Mid and distal esophageal wall thickening, of  indeterminate etiology.    Lung/pleura: No pleural effusion or pneumothorax. Mild basilar  pulmonary opacities, likely atelectasis.    Abdomen/pelvis:    Hepatobiliary: Diffuse hyperdense appearance of the liver, likely to  underlying hepatic steatosis. The gallbladder is unremarkable.    Pancreas: No main pancreatic ductal dilatation or definite solid  pancreatic mass.    Spleen: The spleen is mildly enlarged measuring 13.1 cm in  craniocaudal dimension.    Adrenal glands: No adrenal nodules.    Kidneys: No radiodense kidney/ureteral stones or hydronephrosis in the  kidney.    Bowel: No abnormally dilated bowel loops.    Peritoneum: No significant free fluid in the abdomen or pelvis. No  free peritoneal portal venous gas.    Pelvic organs: Unremarkable.    Lymph nodes: No significant abdominopelvic lymphadenopathy.    Bones and soft tissue: No suspicious osseous lesion. Small  fat-containing left inguinal hernia.      Impression    IMPRESSION:   1. No evidence for thoracoabdominal aortic aneurysm or dissection.  2. Hepatic steatosis.  3. Splenomegaly.  4. Mid and distal esophageal wall thickening, of indeterminate  etiology, could be seen with esophagitis.    ANGLE MADRID MD         SYSTEM ID:  [id number]   Echocardiogram Complete     Value    LVEF  55-60%    Narrative    [id number]  [id number]  [id number]  [id number]^PARKER^SABIHA^KOJO     [facility]  Echocardiography Laboratory  [address] [place] [address]     Name: REJI MARSHALL  MRN: [id number]  : 1970  Study Date: 2023 05:20 PM  Age: 53 yrs  Gender: Male  Patient Location: [facility]  Reason For Study: Tachycardia, Chest Pain  Ordering Physician: PARKER  SABIHA VARMA  Referring Physician: SABIHA CANALES  Performed By: [facility] Thalia Haji     BSA: 2.4 m2  Height: 74 in  Weight: 265 lb  HR: 110  BP: 154/93 mmHg  ______________________________________________________________________________  Procedure  Complete Portable Echo Adult. Optison (NDC #[id number]) given intravenously.  Technically difficult study.  ______________________________________________________________________________  Interpretation Summary     The visual ejection fraction is 55-60%. Grossly normal wall motion.  Right ventricular function cannot be assessed due to poor image quality. On  very limited views, RV function looks probably normal.  No hemodynamically significant valve disease.  The inferior vena cava was normal in size with preserved respiratory  variability.  There is no pericardial effusion.     Technically difficult study despite contrast use due to patient motion. The  rhythm was sinus tachycardia.  ______________________________________________________________________________  Left Ventricle  The left ventricle is normal in size. There is borderline concentric left  ventricular hypertrophy. There is concentric remodeling present. The visual  ejection fraction is 55-60%. Diastolic function not assessed due to  tachycardia. Diastolic Doppler findings (E/E' ratio and/or other parameters)  suggest left ventricular filling pressures are indeterminate.     Right Ventricle  Right ventricular function cannot be assessed due to poor image quality. On  very limited views, RV function looks probably normal.     Atria  Normal left atrial size. Right atrial size is normal.     Mitral Valve  The mitral valve is normal in structure and function. There is trace mitral  regurgitation.     Tricuspid Valve  The tricuspid valve is not well visualized. Right ventricular systolic  pressure could not be approximated due to inadequate tricuspid regurgitation.     Aortic Valve  The aortic valve is trileaflet  with aortic valve sclerosis. No aortic  regurgitation is present. No hemodynamically significant valvular aortic  stenosis.     Pulmonic Valve  The pulmonic valve is not well visualized.     Vessels  The aortic root is normal size. The inferior vena cava was normal in size with  preserved respiratory variability.     Pericardium  There is no pericardial effusion.     Rhythm  The rhythm was sinus tachycardia.  ______________________________________________________________________________  MMode/2D Measurements & Calculations  IVSd: 1.2 cm     LVIDd: 4.8 cm  LVIDs: 3.1 cm  LVPWd: 1.3 cm  FS: 35.4 %  LV mass(C)d: 232.2 grams  LV mass(C)dI: 94.8 grams/m2  Ao root diam: 3.3 cm  asc Aorta Diam: 3.6 cm  LVOT diam: 2.1 cm  LVOT area: 3.5 cm2  LA Volume (BP): 42.0 ml  LA Volume Index (BP): 17.1 ml/m2  RWT: 0.54  TAPSE: 2.1 cm     Doppler Measurements & Calculations  MV E max jonas: 105.0 cm/sec  MV dec time: 0.14 sec  PA acc time: 0.12 sec  E/E' av.6  Lateral E/e': 10.1  Medial E/e': 9.1  RV S Jonas: 19.1 cm/sec     ______________________________________________________________________________  Report approved by: Rajeev Nesbitt 2023 06:18 PM             Discharge Orders      Reason for your hospital stay    You were hospitalized for diabetic ketoacidosis. You were started on insulin this hospital stay.     Follow-up and recommended labs and tests     Follow up with primary care provider, within 7 days for hospital follow- up.  No follow up labs or test are needed.     Activity    Your activity upon discharge: activity as tolerated     Monitor and record    blood glucose 2 times a day, in the morning and before dinner     Diet    Follow this diet upon discharge:       Moderate Consistent Carb (60 g CHO per Meal) Diet     Discharge Medications   Current Discharge Medication List        START taking these medications    Details   metFORMIN (GLUCOPHAGE) 1000 MG tablet Take 0.5 tablets (500 mg) by mouth 2 times daily  (with meals) for 7 days, THEN 1 tablet (1,000 mg) 2 times daily (with meals) for 30 days.  Qty: 67 tablet, Refills: 0    Associated Diagnoses: Diabetic ketoacidosis without coma associated with type 2 diabetes mellitus (H)           CONTINUE these medications which have CHANGED    Details   blood glucose (NO BRAND SPECIFIED) test strip Use to test blood sugar 4 times daily or as directed.  Qty: 100 strip, Refills: 6    Comments: To accompany: glucometer per insurance.  Associated Diagnoses: Diabetic ketoacidosis without coma associated with type 2 diabetes mellitus (H)      blood glucose monitoring (NO BRAND SPECIFIED) meter device kit Use to test blood sugar 4 times daily or as directed.  Qty: 1 kit, Refills: 0    Comments: Preferred blood glucose meter OR supplies to accompany: glucometer per insurance  Associated Diagnoses: Diabetic ketoacidosis without coma associated with type 2 diabetes mellitus (H)      thin (NO BRAND SPECIFIED) lancets Use with lanceting device.  Qty: 100 each, Refills: 6    Comments: To accompany: per insurance.  Associated Diagnoses: Diabetic ketoacidosis without coma associated with type 2 diabetes mellitus (H)           CONTINUE these medications which have NOT CHANGED    Details   glimepiride (AMARYL) 2 MG tablet Take 1 tablet (2 mg) by mouth every morning (before breakfast)  Qty: 60 tablet, Refills: 0    Associated Diagnoses: Type 2 diabetes mellitus without complication, without long-term current use of insulin (H)      lisinopril (ZESTRIL) 10 MG tablet Take 1 tablet (10 mg) by mouth daily  Qty: 90 tablet, Refills: 0    Associated Diagnoses: Benign essential hypertension           Allergies   No Known Allergies

## 2023-08-15 NOTE — UTILIZATION REVIEW
"   Admission Status; Secondary Review Determination         Under the authority of the Utilization Management Committee, the utilization review process indicated a secondary review on the above patient.  The review outcome is based on review of the medical records, discussions with staff, and applying clinical experience noted on the date of the review.          (x) Observation Status Appropriate - This patient does not meet hospital inpatient criteria and is placed in observation status. If this patient's primary payer is Medicare and was admitted as an inpatient, Condition Code 44 should be used and patient status changed to \"observation\".     RATIONALE FOR DETERMINATION    53-year-old male with a history of uncontrolled type 2 diabetes was admitted on 8/14/2023 due to weakness, fatigue, and palpitations. He was diagnosed with diabetic ketoacidosis, evidenced by a blood glucose of 428, bicarb 19, and an anion gap of 17. Following insulin treatment, his gap normalized by the subsequent day. The patient's long-standing poor diabetes control was highlighted by Hgb A1c levels ranging from 8.8% to 10.6% since 2021. During his stay, he received diabetic education, a new glucometer, and was initiated on Insulin Lantus 20 units daily. A CT scan also revealed hepatic steatosis, splenomegaly, and esophageal wall thickening, all warranting outpatient follow-up, despite his denial of alcohol use and no elevated LFTs.  The severity of illness, intensity of service provided, expected LOS and risk for adverse outcome make the care appropriate for further observation; however, doesn't meet criteria for hospital inpatient admission. Dr Brown notified of this determination.    This document was produced using voice recognition software.      The information on this document is developed by the utilization review team in order for the business office to ensure compliance.  This only denotes the appropriateness of proper admission " status and does not reflect the quality of care rendered.         The definitions of Inpatient Status and Observation Status used in making the determination above are those provided in the CMS Coverage Manual, Chapter 1 and Chapter 6, section 70.4.      Sincerely,     CHUY HURST MD    System Medical Director  Utilization Management  [facility].

## 2023-08-15 NOTE — H&P
[facility]    History and Physical  Hospitalist       Date of Admission:  8/14/2023    Assessment & Plan   Scout Perez is a 53 year old adult with history of diabetes mellitus type 2 who presents to the emergency room anxiety and sense of palpitation.    Mild acute diabetic ketoacidosis  Poorly controlled diabetes mellitus type 2  -[Prior to admission on glimepiride 2 mg every morning]  -Last hemoglobin A1c on 7/27/2023 was 9  -Patient presents with what he thought was anxiety and palpitations and he was found to be in diabetic ketoacidosis and dehydrated  -Blood glucose 428 with bicarb of 19 and anion gap of 17 and positive ketones  -Continue DKA protocol with IV fluids and IV insulin  -We will continue to check ketones every 4 hours x3  -Patient is not nauseous, no vomiting, will allow diet  -I anticipate he will be able to come off insulin drip by tomorrow morning  -We will likely need adjustment of his diabetic regimen prior to discharge    Pseudohyponatremia  -Recheck with correction of hyperglycemia and dehydration tomorrow    Mild leukocytosis  -Suspect this is from dehydration/hemoconcentration  -UA without evidence of UTI  -We will repeat with IV hydration tomorrow.    Essential hypertension  Sinus tachycardia  -Resume prior to admission lisinopril once verified by the pharmacy.    Gastroesophageal reflux disease  -Resume prior to admission PPI    Major depressive disorder  -Previously followed by psychiatry, it appears like he is not on any active medication at the moment.  -Denies suicidal or homicidal ideation  -Outpatient follow-up    Learning disability  -Patient apparently has a history of learning disability, unclear how much that is contributing to his poorly controlled diabetes  -Social work consult for outpatient resources      Clinically Significant Risk Factors Present on Admission                  # Hypertension: Noted on problem list     # DMII: A1C = 9.0 % (Ref  "range: 0.0 - 5.6 %) within past 6 months    # Obesity: Estimated body mass index is 34.49 kg/m  as calculated from the following:    Height as of 8/4/23: 1.88 m (6' 2\").    Weight as of 8/4/23: 121.8 kg (268 lb 9.6 oz).                Medical Decision Making       **CLEAR ALL SELECTIONS**        DVT Prophylaxis: Pneumatic Compression Devices  Code Status: Full Code    Disposition: Expected discharge in 2+ days    Paco Ge MD, MD    Primary Care Physician   Physician No Ref-Primary    Chief Complaint   Anxiety, tachycardia    History is obtained from the patient    History of Present Illness   Scout Perez is a 53 year old adult who presents to the emergency room with concerns for anxiety and rapid heart rate.  Patient mentions that he has had a difficult time at work in relation to his boss over the past year.  He apparently had a very rough day at work and was under considerable stress and felt really anxious.  He eventually released home but just could not get himself to relax and he continued to feel anxious and was having palpitations and therefore came to the emergency room.  On questioning further he denies any chest pain or shortness of breath.  There is no diaphoresis.  Denies nausea or vomiting.  He feels that he did not drink enough fluids during the day and probably could be dehydrated.  Paramedics found him tachycardic and hypertensive.  Patient does have a history of diabetes mellitus type 2 and has had poor control of his diabetes lately.  He was last admitted to [facility] between June 28 and June 21, 2023 and was on Glucotrol for his diabetes.  Recently he was switched to Amaryl which he says he has been taking every day.  He denies dysuria, urinary urgency or frequency.  No fever or chills.  No cough.  No headache, lightheadedness or visual changes.  In the emergency room the patient was found to be tachycardic and dehydrated and in mild DKA.    Past Medical History    I " have reviewed this patient's medical history and updated it with pertinent information if needed.   Past Medical History:   Diagnosis Date    Benign essential hypertension     Learning disability     Type 2 diabetes mellitus (H)        Past Surgical History   I have reviewed this patient's surgical history and updated it with pertinent information if needed.  No past surgical history on file.    Prior to Admission Medications   Prior to Admission Medications   Prescriptions Last Dose Informant Patient Reported? Taking?   blood glucose (NO BRAND SPECIFIED) test strip   No No   Sig: Use to test blood sugar 2 times daily or as directed. To accompany: Blood Glucose Monitor Brands: per insurance.   blood glucose monitoring (NO BRAND SPECIFIED) meter device kit   No No   Sig: Use to test blood sugar 2 times daily or as directed. Preferred blood glucose meter OR supplies to accompany: Blood Glucose Monitor Brands: per insurance.   glimepiride (AMARYL) 2 MG tablet   No No   Sig: Take 1 tablet (2 mg) by mouth every morning (before breakfast)   lisinopril (ZESTRIL) 10 MG tablet   No No   Sig: Take 1 tablet (10 mg) by mouth daily   thin (NO BRAND SPECIFIED) lancets   No No   Sig: Use with lanceting device. To accompany: Blood Glucose Monitor Brands: per insurance.      Facility-Administered Medications: None     Allergies   No Known Allergies    Social History   I have reviewed this patient's social history and updated it with pertinent information if needed. Scout TRINA Perez  reports that Scout Perez has never smoked. Scout Perez has never used smokeless tobacco. Scout MENATorsten Ana reports that Scout Perez does not currently use alcohol. Scout TONG Ana reports that Scout Perez does not use drugs.    Family History   I have reviewed this patient's family history and updated it with pertinent information if needed.   Family History   Problem Relation Age of Onset    Hypertension Mother     Heart Disease  Mother     Mental Illness Father     Kidney Disease Father     Cerebrovascular Disease Father     Myocardial Infarction Father     Diabetes Brother     Alcoholism Brother     Diabetes Paternal Grandfather         Type 1    Diabetes Niece         Type 1    Coronary Artery Disease Early Onset Cousin        Review of Systems   The 10 point Review of Systems is negative other than noted in the HPI or here.     Physical Exam   Temp: 98.2  F (36.8  C)   BP: (!) 142/90 Pulse: (!) 127   Resp: 20 SpO2: 96 % O2 Device: None (Room air)    Vital Signs with Ranges  Temp:  [98.2  F (36.8  C)] 98.2  F (36.8  C)  Pulse:  [127] 127  Resp:  [20] 20  BP: (142)/(90) 142/90  SpO2:  [96 %] 96 %  0 lbs 0 oz    Gen: AAOX3, NAD, comfortable  HEENT: Dry oral mucosa  Neck: Supple neck, good ROM, no stridor  Resp: CTA B/L, normal WOB; no crackles; no wheezes  CVS-tachycardic, no murmur  Abd/GI: Soft, non-tender. BS- normoactive  Skin- Warm, dry, no rashes  MSK: No joint deformity; no edema  Neuro- CN- intact. Moving X 4; No focal deficits.     Data   Data reviewed today:  I personally reviewed the EKG tracing showing sinus tachycardia. .        Recent Labs   Lab 08/14/23  1827   WBC 13.0*   HGB 16.3   MCV 80      *   POTASSIUM 4.3   CHLORIDE 96*   CO2 19*   BUN 15.1   CR 0.65   ANIONGAP 17*   ARELY 9.5   *       No results found for this or any previous visit (from the past 24 hour(s)).
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
## 2023-08-15 NOTE — PHARMACY-ADMISSION MEDICATION HISTORY
Pharmacist Admission Medication History    Admission medication history is complete. The information provided in this note is only as accurate as the sources available at the time of the update.    Medication reconciliation/reorder completed by provider prior to medication history? No    Information Source(s): Patient and CareEverywhere/SureScripts via in-person    Pertinent Information: Patient reports he only takes two medications but did not recall the names and reports no one else would be able to tell me his medications. Med list aligns with Surescripts fill history.     Changes made to PTA medication list:  Added: None  Deleted: None  Changed: None    Medication Affordability:  Not including over the counter (OTC) medications, was there a time in the past 3 months when you did not take your medications as prescribed because of cost?: No    Allergies reviewed with patient and updates made in EHR: yes    Medication History Completed By: Elisa Ludwig RPH 8/14/2023 9:08 PM    Prior to Admission medications    Medication Sig Last Dose Taking? Auth Provider Long Term End Date   glimepiride (AMARYL) 2 MG tablet Take 1 tablet (2 mg) by mouth every morning (before breakfast) 8/14/2023 at AM Yes Deacon Bernard NP Yes    lisinopril (ZESTRIL) 10 MG tablet Take 1 tablet (10 mg) by mouth daily 8/14/2023 at AM Yes Deacon Bernard NP Yes    blood glucose (NO BRAND SPECIFIED) test strip Use to test blood sugar 2 times daily or as directed. To accompany: Blood Glucose Monitor Brands: per insurance.   Deacon Bernard NP     blood glucose monitoring (NO BRAND SPECIFIED) meter device kit Use to test blood sugar 2 times daily or as directed. Preferred blood glucose meter OR supplies to accompany: Blood Glucose Monitor Brands: per insurance.   Deacon Bernard NP     thin (NO BRAND SPECIFIED) lancets Use with lanceting device. To accompany: Blood Glucose Monitor Brands: per insurance.   Deacon Bernard NP

## 2023-08-15 NOTE — PLAN OF CARE
Vital signs stable on room air. Alert and oriented. Lung sounds clear. Bowel sounds active, flatus present. Patient denies pain this shift. Up stand by in room. Tolerating diet. CMS/neuros intact no nausea or vomiting. Tele sinus rhythm. Insulin gtt continued with sugars in the mid 100's.

## 2023-08-15 NOTE — CONSULTS
Care Management Initial Consult    General Information  Assessment completed with:  patient ,         Primary Care Provider verified and updated as needed: Yes     Readmission within the last 30 days: No          Advance Care Planning: NA           Communication Assessment  Patient's communication style:  English             Cognitive  Cognitive/Neuro/Behavioral: WDL                      Living Environment:   People in home: alone      Current living Arrangements:  Apartment      Able to return to prior arrangements:  Yes       Family/Social Support:  Care provided by:  Self  Provides care for:Self                  Description of Support System: Suportive          Current Resources:   Patient receiving home care services:  No     Community Resources:    Equipment currently used at home: Walker    Supplies currently used at home:  [employer] supplies    Employment/Financial:  Employment Status:  Employed full time        Financial Concerns: None            Does the patient's insurance plan have a 3 day qualifying hospital stay waiver?  No    Lifestyle & Psychosocial Needs:  Social Determinants of Health     Tobacco Use: Low Risk  (8/4/2023)    Patient History     Smoking Tobacco Use: Never     Smokeless Tobacco Use: Never     Passive Exposure: Not on file   Alcohol Use: Not on file   Financial Resource Strain: Medium Risk (8/11/2023)    Overall Financial Resource Strain (CARDIA)     Difficulty of Paying Living Expenses: Somewhat hard   Food Insecurity: No Food Insecurity (8/11/2023)    Hunger Vital Sign     Worried About Running Out of Food in the Last Year: Never true     Ran Out of Food in the Last Year: Never true   Transportation Needs: No Transportation Needs (8/11/2023)    PRAPARE - Transportation     Lack of Transportation (Medical): No     Lack of Transportation (Non-Medical): No   Physical Activity: Not on file   Stress: Not on file   Social Connections: Not on file   Intimate Partner Violence: Not on file    Depression: At risk (6/22/2023)    PHQ-2     PHQ-2 Score: 3   Housing Stability: Low Risk  (8/11/2023)    Housing Stability Vital Sign     Unable to Pay for Housing in the Last Year: No     Number of Places Lived in the Last Year: 1     Unstable Housing in the Last Year: No       Functional Status:  Prior to admission patient needed assistance: Independent             Mental Health Status: past concern. Has outpt psychiatrist          Chemical Dependency Status: None                Values/Beliefs:  Spiritual, Cultural Beliefs, Rastafari Practices, Values that affect care:   No              Additional Information:  Met with patient in room to discuss discharge plans. Patient stating he lives in an apartment alone and is independent. Patient stated he is compliant with his Diabetic regimen. Patient continues to work however has some issues with his manager. Patient was admitted with increase HR and glucoses.    Zora Mclaughlin RN [phone]

## 2023-08-15 NOTE — PLAN OF CARE
Goal Outcome Evaluation:    Orientations: AOx4  Vitals/Pain: VSS, Pt on RA. Pt denies pain   Tele: NSR  Lines/Drains: AYSHAE & LUCIA PIV's x1 WDL  Skin/Wounds: WDL  GI/: WDL  LS: Diminished/clear  Labs: Abnormal/Trends, Electrolyte Replacement- Blood sugar checks   Ambulation/Assist: SBA

## 2023-08-16 ENCOUNTER — PATIENT OUTREACH (OUTPATIENT)
Dept: CARE COORDINATION | Facility: CLINIC | Age: 53
End: 2023-08-16
Payer: COMMERCIAL

## 2023-08-16 NOTE — PROGRESS NOTES
[facility]    Background: Transitional Care Management program identified per system criteria and reviewed by [facility] team for possible outreach.    Assessment: Upon chart review, CCR Team member will not proceed with patient outreach related to this episode of Transitional Care Management program due to reason below:    Patient has active communication with a nurse, provider or care team for reason of post-hospital follow up plan.  Outreach call by CCR team not indicated to minimize duplicative efforts.     Per chart review, Care Coordination team outreached to patient today.    Plan: Transitional Care Management episode addressed appropriately per reason noted above.      Cassie Murrieta RN  [facility], [facility]    *Connected Care Resource Team does NOT follow patient ongoing. Referrals are identified based on internal discharge reports and the outreach is to ensure patient has an understanding of their discharge instructions.

## 2023-08-16 NOTE — PROGRESS NOTES
Care Management Discharge Note    Discharge Date: 08/15/2023       Discharge Disposition:  Home    Discharge Services:  None    Discharge DME:  None    Discharge Transportation:  Mother    Private pay costs discussed: Not applicable    Does the patient's insurance plan have a 3 day qualifying hospital stay waiver?  No    PAS Confirmation Code:    Patient/family educated on Medicare website which has current facility and service quality ratings:      Education Provided on the Discharge Plan:    Persons Notified of Discharge Plans: Patient himself  Patient/Family in Agreement with the Plan:  Yes    Handoff Referral Completed: No    Additional Information:  Patient discharging today. Appointment with    [facility] 17, 2023 Provider Visit 9:10 AM  GUNNAR Hernandez CNP [facility] [address]  [address]  [facility] [address] [phone]   Made . However patient refused it stating he is working with his clinic coordinator who will be able to find a PCP for him with [facility] [place]. Writer stated that the appointment will be there and patient encouraged to call the clinic  and cancel it if he is able to find a [facility] MD for follow up. Patient in agreement with the plan.    Zora Mclaughlin RN

## 2023-08-16 NOTE — PROGRESS NOTES
Clinic Care Coordination Contact    [facility] received call/vm from patient requesting a return call in regards to establishing primary care. Patient reports he was recently seen in ED and was encouraged to follow-up with CC to establish primary care. [facility] attempted to return patients call, however, he was unavailable. Left detailed voicemail reviewing CC encounter 8/11/2023 in regards to establishing care and encouraged patient to contact clinics directly to determine which providers are accepting new patients. Per chart review, patient has hospital follow-up appointment on 8/17/2023. [facility] updated visit provider on above. No further needs identified at this time.     Plan: Patient was provided with  CC's contact information and encouraged to call with questions, concerns, and/or support needs.  CC will remain available as needed. No further care coordination outreaches will be made at this time.    F[facility]HAN Hernandez/[facility]  Social Work Care Coordinator  [facility], and [place]  Phone: [phone]

## 2023-08-20 DIAGNOSIS — E11.9 TYPE 2 DIABETES MELLITUS WITHOUT COMPLICATION, WITHOUT LONG-TERM CURRENT USE OF INSULIN (H): ICD-10-CM

## 2023-08-21 RX ORDER — GLIPIZIDE 5 MG/1
5 TABLET ORAL
Qty: 60 TABLET | Refills: 2 | OUTPATIENT
Start: 2023-08-21

## 2023-09-07 NOTE — TELEPHONE ENCOUNTER
Called and spoke with patient, asked if he had any questions regarding his worker's comp paperwork. Patient reported that his Union told him they took care of the paperwork. I provided the patient the call back number at [phone] in case any question does arise. Patient was appreciative of call and remarked that he has no questions at this time.    Closing encounter.    Ismael Faulkner, Visit Facilitator

## 2023-09-11 DIAGNOSIS — E11.9 TYPE 2 DIABETES MELLITUS WITHOUT COMPLICATION, WITHOUT LONG-TERM CURRENT USE OF INSULIN (H): ICD-10-CM

## 2023-09-11 RX ORDER — GLIMEPIRIDE 2 MG/1
2 TABLET ORAL
Qty: 60 TABLET | Refills: 0 | Status: SHIPPED | OUTPATIENT
Start: 2023-09-11

## 2023-09-18 DIAGNOSIS — I10 BENIGN ESSENTIAL HYPERTENSION: ICD-10-CM

## 2023-09-18 RX ORDER — LISINOPRIL 10 MG/1
10 TABLET ORAL DAILY
Qty: 90 TABLET | Refills: 1 | Status: SHIPPED | OUTPATIENT
Start: 2023-09-18

## 2023-11-14 ENCOUNTER — PATIENT OUTREACH (OUTPATIENT)
Dept: CARE COORDINATION | Facility: CLINIC | Age: 53
End: 2023-11-14
Payer: COMMERCIAL

## 2023-11-14 NOTE — PROGRESS NOTES
Clinic Care Coordination Contact  [facility] [place]/Voicemail    Clinical Data: Care Coordinator Outreach  Outreach Documentation Number of Outreach Attempt   11/14/2023  11:33 AM 1     [facility] received call/vm from patient requesting a return call to review CC services/resources. [facility] attempted to contact patient, however, patient unavailable. Left message on patient's voicemail with call back information and upcoming availability to schedule a CC assessment.     Plan: [facility] will await return call from patient and/or try to reach patient again in 1-2 business days.    FARHAN [facility]  Social Work Care Coordinator  [facility] [place], and [place]  Phone: [phone]

## 2023-11-15 NOTE — PROGRESS NOTES
Clinic Care Coordination Contact    [facility] received return call/vm from patient requesting call back to arrange a date/time for a CC assessment. Per report, patient unavailable rest of this week (11/16 - 11/17) and some of next week due to upcoming holiday. [facility] attempted to return patients call to offer additional availability, however, patient unavailable. Left detailed voice message with availability for week of 11/20/2023 and encouraged patient to return call.     Plan: [facility] will await return call from patient and/or try to reach patient again in 3-5 business days.     FARHAN Hernandez/[facility]  Social Work Care Coordinator  [facility], and [place]  Phone: [phone]    Addendum:   [facility] received call from patient requesting phone appointment for 11/20/2023 at 9:00am. [facility] scheduled appointment for above date/time. Allowed time and space for patient to voice any urgent needs. Patient shared he has been sick on/off every few weeks with little/no energy. Patient reports he is currently feeling better, however, worries there is something bigger going on. Patient interested in establishing primary care, however, would like to discuss further on 11/20. [facility] strongly encouraged patient to present to Urgent Care/ED for further assessment if/when feeling unwell. Patient expressed understanding and agreement with plan. No further CC needs identified at this time.     Plan: [facility] will attempt to contact patient on 11/20/2023 at 9:00am to initiate contact and offer care management services.     FARHAN Hernandez/[facility]  Social Work Care Coordinator  [facility], and [place]  Phone: [phone]

## 2023-11-20 ENCOUNTER — PATIENT OUTREACH (OUTPATIENT)
Dept: NURSING | Facility: CLINIC | Age: 53
End: 2023-11-20
Payer: COMMERCIAL

## 2023-11-20 ASSESSMENT — ACTIVITIES OF DAILY LIVING (ADL): DEPENDENT_IADLS:: INDEPENDENT

## 2023-11-20 NOTE — LETTER
[facility] CARE COORDINATION  [address]  [facility] [address]    November 20, 2023      Scout Perez  [address] [facility] [address]          Dear Scout,    I am a clinic care coordinator who works with the [facility], [place], and [facility]. I wanted to thank you for spending the time to talk with me.  Below is a description of clinic care coordination and how I can further assist you shall any additional needs arise.        The clinic care coordination team is made up of a registered nurse, , financial resource worker and community health worker who understand the health care system. The goal of clinic care coordination is to help you manage your health and improve access to the health care system. Our team works alongside your provider to assist you in determining your health and social needs. We can help you obtain health care and community resources, providing you with necessary information and education. We can work with you through any barriers and develop a care plan that helps coordinate and strengthen the communication between you and your care team.  Our services are voluntary and are offered without charge to you personally.    Please feel free to contact me with any questions or concerns regarding care coordination and what we can offer.      We are focused on providing you with the highest-quality healthcare experience possible.    Sincerely,     FARHAN Hernandez/[facility]  Social Work Care Coordinator  [facility], and [place]  Phone: [phone]    Enclosed: I have enclosed a copy of the Patient Centered Plan of Care. This has helpful information and goals that we have talked about. Please keep this in an easy to access place to use as needed.

## 2023-11-20 NOTE — PROGRESS NOTES
Clinic Care Coordination Contact  Clinic Care Coordination Contact  OUTREACH    Referral Information:  Referral Source: Self-patient/Caregiver  Primary Diagnosis: Psychosocial    Chief Complaint   Patient presents with    Clinic Care Coordination - Initial      Universal Utilization: Reviewed 11/20/2023  Clinic Utilization  Difficulty keeping appointments:: No  Compliance Concerns: No  No-Show Concerns: No  No PCP office visit in Past Year: Yes  Utilization      No Show Count (past year)  1             ED Visits  3             Hospital Admissions  2                    Current as of: 11/15/2023 10:37 AM              Clinical Concerns:  Current Medical Concerns:    Patient Active Problem List   Diagnosis    Anxiety    Major depressive disorder, recurrent episode, moderate (H)    Learning disability    Hyperlipidemia    Primary insomnia    Type 2 diabetes mellitus without complication, without long-term current use of insulin (H)    Benign essential hypertension    Sinus tachycardia    Methamphetamine abuse (H)    Elevated lactic acid level    DKA (diabetic ketoacidosis) (H)    Diabetic ketoacidosis (H)    Palpitations    Diabetic ketoacidosis without coma associated with type 2 diabetes mellitus (H)    Leukocytosis, unspecified type   Current Behavioral Concerns: none noted     Education Provided to patient: SWCC role and reason for call.    Pain  Pain (GOAL):: No  Health Maintenance Reviewed: Due/Overdue   Health Maintenance Due   Topic Date Due    ADVANCE CARE PLANNING  Never done    DEPRESSION ACTION PLAN  Never done    COLORECTAL CANCER SCREENING  Never done    Pneumococcal Vaccine: Pediatrics (0 to 5 Years) and At-Risk Patients (6 to 64 Years) (2 - PCV) 11/08/2011    ZOSTER IMMUNIZATION (2 of 2) 10/19/2021    YEARLY PREVENTIVE VISIT  07/29/2022    EYE EXAM  02/11/2023    A1C  11/14/2023   Clinical Pathway: None    Medication Management:  Medication review status: Medications not reviewed during this encounter due  to nature of call.   Current Outpatient Medications   Medication    blood glucose (NO BRAND SPECIFIED) test strip    blood glucose monitoring (NO BRAND SPECIFIED) meter device kit    glimepiride (AMARYL) 2 MG tablet    lisinopril (ZESTRIL) 10 MG tablet    metFORMIN (GLUCOPHAGE) 1000 MG tablet    thin (NO BRAND SPECIFIED) lancets     No current facility-administered medications for this visit.     Functional Status:  Dependent ADLs:: Independent  Dependent IADLs:: Independent  Bed or wheelchair confined:: No  Mobility Status: Independent    Living Situation:  Current living arrangement:: I live in a private home    Lifestyle & Psychosocial Needs:  Social Determinants of Health     Food Insecurity: No Food Insecurity (8/11/2023)    Hunger Vital Sign     Worried About Running Out of Food in the Last Year: Never true     Ran Out of Food in the Last Year: Never true   Depression: At risk (6/22/2023)    PHQ-2     PHQ-2 Score: 3   Housing Stability: Low Risk  (8/11/2023)    Housing Stability Vital Sign     Unable to Pay for Housing in the Last Year: No     Number of Places Lived in the Last Year: 1     Unstable Housing in the Last Year: No   Tobacco Use: Low Risk  (8/4/2023)    Patient History     Smoking Tobacco Use: Never     Smokeless Tobacco Use: Never     Passive Exposure: Not on file   Financial Resource Strain: Medium Risk (8/11/2023)    Overall Financial Resource Strain (CARDIA)     Difficulty of Paying Living Expenses: Somewhat hard   Alcohol Use: Not on file   Transportation Needs: No Transportation Needs (8/11/2023)    PRAPARE - Transportation     Lack of Transportation (Medical): No     Lack of Transportation (Non-Medical): No   Physical Activity: Not on file   Interpersonal Safety: Not on file   Stress: Not on file   Social Connections: Not on file     Diet:: Regular  Inadequate nutrition (GOAL):: No  Tube Feeding: No  Inadequate activity/exercise (GOAL):: No  Significant changes in sleep pattern (GOAL):  No  Transportation means:: Regular car     Orthodoxy or spiritual beliefs that impact treatment:: No  Mental health DX:: Yes  Mental health DX how managed:: None  Mental health management concern (GOAL):: No  Chemical Dependency Status: No Current Concerns  Informal Support system:: Family      SWCC spoke with patient to introduce self and offer Care Coordination services. Patient shared he is interested in establishing primary care, however, patients insurance coverage recently changed. Patient unsure of current health insurance plan, therefore, unaware of providers that are in network. SWCC reviewed prior to establishing primary care patient should confirm current coverage. Patient expressed understanding. Patient shared he is certain that he cancelled his health insurance vis his employer due to having [employer], however, patient received a letter in the mail reviewing end of coverage for [employer]. Due to complexity of above, CC offered to place a FRW referral to review patients current health coverage and/or assist with reapplying for coverage. Patient expressed understanding and agreement with plan. FRW referral placed 11/20/2023. SWCC offered to follow-up with patient in 2 weeks to review insurance coverage and assist with establishing a primary care provider. Patient in agreement with plan. Allowed time and space for patient to voice any additional needs. No further CC needs identified at this time. Patient in agreement with care coordination services. Created patient centered goals and sent to patient via [employer].     Resources and Interventions:  Current Resources:   Community Resources: None  Supplies Currently Used at Home: None  Equipment Currently Used at Home: none  Employment Status: employed full-time  Advance Care Plan/Directive  Advanced Care Plans/Directives on file:: No  Referrals Placed: Financial Services    Care Plan:  Care Plan: Financial Wellbeing       Problem: Patient expresses financial  resource strain       Goal: Create an action plan to increase financial stability       Start Date: 11/20/2023 Expected End Date: 2/19/2024    This Visit's Progress: 10%    Priority: High    Note:     Barriers: none  Strengths: goal oriented   Patient expressed understanding of goal: Yes  Action steps to achieve this goal:  I will continue to work with FRW on the application(s) for: Medical Assistance/Insurance  I understand a referral was placed to the Financial Resource Worker, I will receive a call within the next 3 business days.      I will use the clinic as a resource and I understand I can contact my clinic with 24/7 after hours services available.   I will continue to outreach to care coordination as needed for additional resources or supports.                            Care Plan: Health Maintenance       Problem: Health Maintenance Due or Overdue       Goal: Become up-to-date with health maintenance visit(s)       Start Date: 11/20/2023 Expected End Date: 2/12/2024    This Visit's Progress: 10%    Priority: High    Note:     Barriers: none  Strengths: goal oriented   Patient expressed understanding of goal: Yes  Action steps to achieve this goal:  I will call to schedule an initial appointment to establish primary care.  I will follow up with scheduled appointments as recommended  I will take medications as prescribed.   I will contact my care team with questions, concerns, support needs.   I will use the clinic as a resource and I understand I can contact my clinic with 24/7 after hours services available.   I will discuss, review, schedule and complete any recommended overdue health maintenance with my provider/care team.  I will continue to outreach to care coordination as needed for additional resources or supports.                          Patient/Caregiver understanding: Patient reports understanding and denies any additional questions or concerns at this time. DEANNA CC engaged in [employer]ID[employer] communication  during encounter.    Outreach Frequency: monthly, more frequently as needed    Financial Resource Worker Screening  [facility] Benefits  Is patient requesting help applying for [facility] benefits?: No    [place]:  [place]  Is this an [place] renewal?: Yes ([employer])  Is this a new insurance application request?: No    Any other information for the FRW?: Patient was denied MA. He works full time but does not have insurance through employer. Patient received a letter from [facility] in the mail regarding [employer] coverage. Unsure if patient renewed or needs to renew. Please advise and assist.    Care Coordination team will tell patient:   Thank you for answering all the questions, based on screening questions, our Financial Resource Worker will reach out to you with additional questions and next steps.    Plan: Patient will review introduction to CC letter and Complex Care Plan sent via mail.  CC will remain available for CC needs and will schedule a follow-up outreach in two weeks.     FARHAN Hernandez/LICSW  Social Work Care Coordinator  [facility], and [place]  Phone: [phone]

## 2023-11-20 NOTE — LETTER
[facility]  Patient Centered Plan of Care  About Me:        Patient Name:  Scout Perez    YOB: 1970  Age:         53 year old   [place] MRN:    [id number] Telephone Information:  Home Phone [phone]   Mobile [phone]       Address:  [address] [place] [facility] [address] Email address:  No e-mail address on record      Emergency Contact(s)    Name Relationship Lgl Grd Work Phone Home Phone Mobile Phone           Primary language:  English     needed? No   [place] [employer]:  [phone] op. 1  Other communication barriers:Other  Current living arrangement: I live in a private home  Mobility Status/ Medical Equipment: Independent    Health Maintenance  Health Maintenance Reviewed: Due/Overdue   Health Maintenance Due   Topic Date Due    ADVANCE CARE PLANNING  Never done    DEPRESSION ACTION PLAN  Never done    COLORECTAL CANCER SCREENING  Never done    Pneumococcal Vaccine: Pediatrics (0 to 5 Years) and At-Risk Patients (6 to 64 Years) (2 - PCV) 11/08/2011    ZOSTER IMMUNIZATION (2 of 2) 10/19/2021    YEARLY PREVENTIVE VISIT  07/29/2022    EYE EXAM  02/11/2023    A1C  11/14/2023     My Access Plan  Medical Emergency 911   Primary Clinic Line [facility] - [phone]   24 Hour Appointment Line [phone] or  [phone]) (toll-free)   24 Hour Nurse Line [phone] (toll-free)   Preferred Urgent Care No data recorded   Preferred Hospital [facility]  [phone]     Preferred Pharmacy [address]     Behavioral Health Crisis Line The National Suicide Prevention Lifeline at 1-108.967.7784 or Text/Call 968     My Care Team Members  Patient Care Team         Relationship Specialty Notifications Start End    Quincy Lyons MD PCP - General Internal Medicine  8/17/23     Phone: [phone] Fax: [phone]         [address] [place] [address]    Shayla Mo LMFT Assigned Behavioral Health Provider   1/28/23     Phone: [phone]           [facility] - [place] [address] [place] [address]    Deacon Tavares DPM Assigned Musculoskeletal Provider   4/29/23     Phone: [phone] Fax: [phone]         [address] [facility] [address]    Quincy Lyons MD Assigned PCP   8/19/23     Phone: [phone] Fax: [phone]         [address] [address]    Whitney Barrett, [facility] Care Coordinator  - Clinical Admissions 11/14/23     Phone: [phone] Fax: [phone]                  My Care Plans  Self Management and Treatment Plan    Care Plan  Care Plan: Financial Wellbeing       Problem: Patient expresses financial resource strain       Goal: Create an action plan to increase financial stability       Start Date: 11/20/2023 Expected End Date: 2/19/2024    This Visit's Progress: 10%    Priority: High    Note:     Barriers: none  Strengths: goal oriented   Patient expressed understanding of goal: Yes  Action steps to achieve this goal:  I will continue to work with FRW on the application(s) for: Medical Assistance/Insurance  I understand a referral was placed to the Financial Resource Worker, I will receive a call within the next 3 business days.      I will use the clinic as a resource and I understand I can contact my clinic with 24/7 after hours services available.   I will continue to outreach to care coordination as needed for additional resources or supports.                            Care Plan: Health Maintenance       Problem: Health Maintenance Due or Overdue       Goal: Become up-to-date with health maintenance visit(s)       Start Date: 11/20/2023 Expected End Date: 2/12/2024    This Visit's Progress: 10%    Priority: High    Note:     Barriers: none  Strengths: goal oriented   Patient expressed understanding of goal:  Yes  Action steps to achieve this goal:  I will call to schedule an initial appointment to establish primary care.  I will follow up with scheduled appointments as recommended  I will take medications as prescribed.   I will contact my care team with questions, concerns, support needs.   I will use the clinic as a resource and I understand I can contact my clinic with 24/7 after hours services available.   I will discuss, review, schedule and complete any recommended overdue health maintenance with my provider/care team.  I will continue to outreach to care coordination as needed for additional resources or supports.                            Advance Care Plans/Directives:   Advanced Care Plan/Directives on file: No     My Medical and Care Information  Problem List   Patient Active Problem List   Diagnosis    Anxiety    Major depressive disorder, recurrent episode, moderate (H)    Learning disability    Hyperlipidemia    Primary insomnia    Type 2 diabetes mellitus without complication, without long-term current use of insulin (H)    Benign essential hypertension    Sinus tachycardia    Methamphetamine abuse (H)    Elevated lactic acid level    DKA (diabetic ketoacidosis) (H)    Diabetic ketoacidosis (H)    Palpitations    Diabetic ketoacidosis without coma associated with type 2 diabetes mellitus (H)    Leukocytosis, unspecified type        Current Medications and Allergies:  Current Outpatient Medications   Medication    blood glucose (NO BRAND SPECIFIED) test strip    blood glucose monitoring (NO BRAND SPECIFIED) meter device kit    glimepiride (AMARYL) 2 MG tablet    lisinopril (ZESTRIL) 10 MG tablet    metFORMIN (GLUCOPHAGE) 1000 MG tablet    thin (NO BRAND SPECIFIED) lancets     No current facility-administered medications for this visit.     No Known Allergies    Care Coordination Start Date: 11/14/2023   Frequency of Care Coordination: monthly, more frequently as needed     Form Last Updated: 11/20/2023

## 2023-11-21 ENCOUNTER — PATIENT OUTREACH (OUTPATIENT)
Dept: CARE COORDINATION | Facility: CLINIC | Age: 53
End: 2023-11-21
Payer: COMMERCIAL

## 2023-11-21 NOTE — PROGRESS NOTES
Clinic Care Coordination Contact  Program: [employer] Insurance   County:  [facility] Case #:  County Worker:   [place] #:   Subscriber #:   Renewal:  Date Applied:     [facility] Outreach:   11/21/23:    Health Insurance:      Referral/Screening:   Financial Resource Worker Screening  [facility] Benefits  Is patient requesting help applying for [facility] benefits?: No     [place]urance:  [place] No  Is this an insurance renewal?: Yes ([employer])  Is this a new insurance application request?: No     Any other information for the FRW?: Patient was denied MA. He works full time but does not have insurance through employer. Patient received a letter from [employer] in the mail regarding [employer] coverage. Unsure if patient renewed or needs to renew. Please advise and assist.     Care Coordination team will tell patient:   Thank you for answering all the questions, based on screening questions, our Financial Resource Worker will reach out to you with additional questions and next steps.

## 2023-11-21 NOTE — PROGRESS NOTES
Clinic Care Coordination Contact  Program: [employer] Insurance   County:  [facility] Case #:  County Worker:   [place] #:   Subscriber #:   Renewal:  Date Applied:     FRW Outreach:   11/21/23: FRW called pt on referral. FRW and pt will call [place] [employer] and [employer] to get answers on his insurance. Pt is going to call FRW back once he knows his schedule next week.   Kasey Jimenez   Financial Resource Worker  [facility]  Clinic Care Coordination  [phone]     Health Insurance:      Referral/Screening:   Financial Resource Worker Screening  [facility] Benefits  Is patient requesting help applying for [facility] benefits?: No     [place]urance:  [place] No  Is this an insurance renewal?: Yes ([place])  Is this a new insurance application request?: No     Any other information for the FRW?: Patient was denied MA. He works full time but does not have insurance through employer. Patient received a letter from [employer] in the mail regarding [employer] coverage. Unsure if patient renewed or needs to renew. Please advise and assist.     Care Coordination team will tell patient:   Thank you for answering all the questions, based on screening questions, our Financial Resource Worker will reach out to you with additional questions and next steps.   Nutrition assessment initiated for critical care LOS. Pt. alert, oriented, Mandarin speaking , known from previous admission , PO nutrition initiated , awaiting meals and tolerance, pt. discharged from hospital  last time on Dysphagia 2 nectar consistency fluid diet as per swallow evaluation recommendation . Noted wt. loss since last admission last wt.

## 2023-12-06 ENCOUNTER — PATIENT OUTREACH (OUTPATIENT)
Dept: CARE COORDINATION | Facility: CLINIC | Age: 53
End: 2023-12-06
Payer: COMMERCIAL

## 2023-12-06 NOTE — PROGRESS NOTES
Clinic Care Coordination Contact    [facility] received return call from patient sharing he is no longer in need of CC services/supports. Patient stated he is working with his employer for insurance coverage and also arranged a primary care visit with a provider at [facility] in [place]. Patient shared he has been feeling unwell lately, therefore, took a leave of absence from his job to focus on his health. Patient reports he is scheduled to see a [facility] provider on 12/8/2023. Patient denied any needs regarding above and shared he is feeling better today but still plans to follow-up with provider. Patient expressed that the above health system/provider are within network and patient is hopeful he will be able to establish ongoing care with them. [facility] validated patients feelings regarding above. Reviewed CC program is geared toward [facility] primary care patients. Patient expressed understanding and voiced he is no longer in need of CC supports/resources. Patient voiced he would reach out to writer if he returns to [facility] and identifies a need for CC services. No further CC needs identified at this time. FYI sent to W. Patient was provided with  CC's contact information and encouraged to call with questions, concerns, and/or support needs.  CC will remain available as needed. No further care coordination outreaches will be made at this time.    FARHAN Hernandez/[facility]  Social Work Care Coordinator  [facility], and [place]  Phone: [phone]

## 2023-12-06 NOTE — PROGRESS NOTES
Clinic Care Coordination Contact  [facility]/Voicemail    Clinical Data: Care Coordinator Outreach  Outreach Documentation Number of Outreach Attempt   12/6/2023  11:53 AM 1     Per chart review patient working with FRW regarding insurance coverage, however, patient awaiting work schedule to arrange follow-up call with FRW. CC attempted to reach patient to check in regarding above, however, patient unavailable. SWCC left detailed message on patient's voicemail with call back information and requested return call.     Plan: [facility] will try to reach patient again in 10 business days to review insurance coverage and assist with establishing a primary care provider.     FARHAN Hernandez/[facility]  Social Work Care Coordinator  [facility] [place] [place], and [place]  Phone: [phone]

## 2023-12-06 NOTE — PROGRESS NOTES
Clinic Care Coordination Contact  Program: [employer] [place]   County:  [facility] Case #:  County Worker:   [place] #:   Subscriber #:   Renewal:  Date Applied:     FRW Outreach:   12/6/23: FRW received message that pt is getting insurance through employer and not wanting FRW help. Taking pt off panel.   Kasey Jimenez   Financial Resource Worker  [facility] Care Coordination  [phone]   11/21/23: FRW called pt on referral. FRW and pt will call [employer] and [employer] to get answers on his insurance. Pt is going to call FRW back once he knows his schedule next week.   Kasey Jimenez   Financial Resource Worker  [facility] Care Coordination  [phone]     Health Insurance:      Referral/Screening:   Financial Resource Worker Screening  [facility] Benefits  Is patient requesting help applying for [facility] benefits?: No     [place]:  [place] No  Is this an insurance renewal?: Yes ([employer])  Is this a new insurance application request?: No     Any other information for the FRW?: Patient was denied MA. He works full time but does not have insurance through employer. Patient received a letter from [employer] in the mail regarding [employer] coverage. Unsure if patient renewed or needs to renew. Please advise and assist.     Care Coordination team will tell patient:   Thank you for answering all the questions, based on screening questions, our Financial Resource Worker will reach out to you with additional questions and next steps.

## 2024-03-11 ENCOUNTER — PATIENT OUTREACH (OUTPATIENT)
Dept: INTERNAL MEDICINE | Facility: CLINIC | Age: 54
End: 2024-03-11
Payer: COMMERCIAL

## 2024-03-11 NOTE — LETTER
March 11, 2024      Scout Perez  [address] [facility] [address]        Dear Scout,     Your team at [facility] cares about your health. We have reviewed your chart and based on our findings; we are making the following recommendations to better manage your health.     You are in particular need of attention regarding the following:     HYPERTENSION FOLLOW UP: Office Visit  Call or MyChart message your clinic to schedule a colonoscopy, schedule/ a FIT Test, or order a Cologuard test. If you are unsure what type of test you need, please call your clinic and speak to clinic staff.   Colon cancer is now the second leading cause of cancer-related deaths in the [place] [facility] for both men and women and there are over 130,000 new cases and 50,000 deaths per year from colon cancer. Colonoscopies can prevent 90-95% of these deaths. Problem lesions can be removed before they ever become cancer. This test is not only looking for cancer, but also getting rid of precancerous lesions.   If you are under/uninsured, we recommend you contact the [employer] Program.[employer] is a free colorectal cancer screening program that provides colonoscopies for eligible under/uninsured [place] men and women. If you are interested in receiving a free colonoscopy, please call [employer] at t 1-953.942.5759 (mention code [employer]) to see if you're eligible. Please have them send us the results.   PREVENTATIVE VISIT: Physical  Please schedule a Nurse Only Appointment with your primary care clinic to update your immunizations that are due.    If you have already completed these items, please contact the clinic via phone or   [employer] so your care team can review and update your records. Thank you for   choosing [facility]ics for your healthcare needs. For any questions,   concerns, or to schedule an appointment please contact our clinic.    Healthy Regards,      Your [facility] Care Team

## 2024-03-11 NOTE — TELEPHONE ENCOUNTER
Patient Quality Outreach    Patient is due for the following:   Diabetes -  A1C and Eye Exam  Hypertension -  BP check  Colon Cancer Screening  Depression  -  PHQ-9 needed  Physical Preventive Adult Physical      Topic Date Due    Pneumococcal Vaccine (2 of 2 - PCV) 11/08/2011    Zoster (Shingles) Vaccine (2 of 2) 10/19/2021       Next Steps:   Schedule a Adult Preventative    Type of outreach:    Sent letter.      Questions for provider review:    None           Madonna Burr MA        Wife Mary can be reached at [phone] with updates.

## 2024-11-04 ENCOUNTER — HOSPITAL ENCOUNTER (INPATIENT)
Facility: CLINIC | Age: 54
LOS: 6 days | Discharge: NURSING, REHAB OR LONG-TERM CARE | End: 2024-11-10
Attending: EMERGENCY MEDICINE | Admitting: STUDENT IN AN ORGANIZED HEALTH CARE EDUCATION/TRAINING PROGRAM
Payer: COMMERCIAL

## 2024-11-04 ENCOUNTER — APPOINTMENT (OUTPATIENT)
Dept: GENERAL RADIOLOGY | Facility: CLINIC | Age: 54
End: 2024-11-04
Attending: EMERGENCY MEDICINE
Payer: COMMERCIAL

## 2024-11-04 DIAGNOSIS — M00.061 STAPHYLOCOCCAL ARTHRITIS OF RIGHT KNEE (H): Primary | ICD-10-CM

## 2024-11-04 DIAGNOSIS — M00.9 PYOGENIC ARTHRITIS OF RIGHT KNEE JOINT, DUE TO UNSPECIFIED ORGANISM (H): ICD-10-CM

## 2024-11-04 DIAGNOSIS — E11.9 TYPE 2 DIABETES MELLITUS WITHOUT COMPLICATION, WITHOUT LONG-TERM CURRENT USE OF INSULIN (H): ICD-10-CM

## 2024-11-04 LAB
ALBUMIN SERPL BCG-MCNC: 4.4 G/DL (ref 3.5–5.2)
ALP SERPL-CCNC: 76 U/L (ref 40–150)
ALT SERPL W P-5'-P-CCNC: 42 U/L (ref 0–70)
ANION GAP SERPL CALCULATED.3IONS-SCNC: 17 MMOL/L (ref 7–15)
APPEARANCE FLD: ABNORMAL
AST SERPL W P-5'-P-CCNC: 13 U/L (ref 0–45)
BASOPHILS # BLD AUTO: 0 10E3/UL (ref 0–0.2)
BASOPHILS NFR BLD AUTO: 0 %
BILIRUB SERPL-MCNC: 1.5 MG/DL
BUN SERPL-MCNC: 12.5 MG/DL (ref 6–20)
CALCIUM SERPL-MCNC: 9.5 MG/DL (ref 8.8–10.4)
CELL COUNT BODY FLUID SOURCE: ABNORMAL
CHLORIDE SERPL-SCNC: 93 MMOL/L (ref 98–107)
COLOR FLD: YELLOW
CREAT SERPL-MCNC: 0.8 MG/DL (ref 0.67–1.17)
CRYSTALS SNV MICRO: NORMAL
EGFRCR SERPLBLD CKD-EPI 2021: >90 ML/MIN/1.73M2
EOSINOPHIL # BLD AUTO: 0 10E3/UL (ref 0–0.7)
EOSINOPHIL NFR BLD AUTO: 0 %
ERYTHROCYTE [DISTWIDTH] IN BLOOD BY AUTOMATED COUNT: 11.9 % (ref 10–15)
GLUCOSE BLDC GLUCOMTR-MCNC: 345 MG/DL (ref 70–99)
GLUCOSE SERPL-MCNC: 421 MG/DL (ref 70–99)
HCO3 SERPL-SCNC: 18 MMOL/L (ref 22–29)
HCT VFR BLD AUTO: 41.9 % (ref 40–53)
HGB BLD-MCNC: 15.5 G/DL (ref 13.3–17.7)
IMM GRANULOCYTES # BLD: 0.1 10E3/UL
IMM GRANULOCYTES NFR BLD: 1 %
LYMPHOCYTES # BLD AUTO: 1.6 10E3/UL (ref 0.8–5.3)
LYMPHOCYTES NFR BLD AUTO: 11 %
LYMPHOCYTES NFR FLD MANUAL: 9 %
MCH RBC QN AUTO: 28.7 PG (ref 26.5–33)
MCHC RBC AUTO-ENTMCNC: 37 G/DL (ref 31.5–36.5)
MCV RBC AUTO: 78 FL (ref 78–100)
MONOCYTES # BLD AUTO: 1.2 10E3/UL (ref 0–1.3)
MONOCYTES NFR BLD AUTO: 8 %
MONOS+MACROS NFR FLD MANUAL: 5 %
NEUTROPHILS # BLD AUTO: 12.1 10E3/UL (ref 1.6–8.3)
NEUTROPHILS NFR BLD AUTO: 81 %
NEUTS BAND NFR FLD MANUAL: 86 %
NRBC # BLD AUTO: 0 10E3/UL
NRBC BLD AUTO-RTO: 0 /100
PLATELET # BLD AUTO: 275 10E3/UL (ref 150–450)
POTASSIUM SERPL-SCNC: 3.9 MMOL/L (ref 3.4–5.3)
PROT SERPL-MCNC: 7.9 G/DL (ref 6.4–8.3)
RBC # BLD AUTO: 5.4 10E6/UL (ref 4.4–5.9)
SODIUM SERPL-SCNC: 128 MMOL/L (ref 135–145)
URATE SERPL-MCNC: 5.6 MG/DL (ref 3.4–7)
WBC # BLD AUTO: 14.9 10E3/UL (ref 4–11)
WBC # FLD AUTO: ABNORMAL /UL

## 2024-11-04 PROCEDURE — 0S9C3ZX DRAINAGE OF RIGHT KNEE JOINT, PERCUTANEOUS APPROACH, DIAGNOSTIC: ICD-10-PCS | Performed by: EMERGENCY MEDICINE

## 2024-11-04 PROCEDURE — 99222 1ST HOSP IP/OBS MODERATE 55: CPT | Performed by: PHYSICIAN ASSISTANT

## 2024-11-04 PROCEDURE — 89060 EXAM SYNOVIAL FLUID CRYSTALS: CPT | Performed by: EMERGENCY MEDICINE

## 2024-11-04 PROCEDURE — 80053 COMPREHEN METABOLIC PANEL: CPT | Performed by: EMERGENCY MEDICINE

## 2024-11-04 PROCEDURE — 85004 AUTOMATED DIFF WBC COUNT: CPT | Performed by: EMERGENCY MEDICINE

## 2024-11-04 PROCEDURE — 89051 BODY FLUID CELL COUNT: CPT | Performed by: EMERGENCY MEDICINE

## 2024-11-04 PROCEDURE — 36415 COLL VENOUS BLD VENIPUNCTURE: CPT | Performed by: EMERGENCY MEDICINE

## 2024-11-04 PROCEDURE — 250N000013 HC RX MED GY IP 250 OP 250 PS 637: Performed by: EMERGENCY MEDICINE

## 2024-11-04 PROCEDURE — 99285 EMERGENCY DEPT VISIT HI MDM: CPT | Mod: 25

## 2024-11-04 PROCEDURE — 250N000011 HC RX IP 250 OP 636: Performed by: PHYSICIAN ASSISTANT

## 2024-11-04 PROCEDURE — 96374 THER/PROPH/DIAG INJ IV PUSH: CPT

## 2024-11-04 PROCEDURE — 250N000013 HC RX MED GY IP 250 OP 250 PS 637: Performed by: PHYSICIAN ASSISTANT

## 2024-11-04 PROCEDURE — 84550 ASSAY OF BLOOD/URIC ACID: CPT | Performed by: EMERGENCY MEDICINE

## 2024-11-04 PROCEDURE — 258N000003 HC RX IP 258 OP 636: Performed by: STUDENT IN AN ORGANIZED HEALTH CARE EDUCATION/TRAINING PROGRAM

## 2024-11-04 PROCEDURE — 87205 SMEAR GRAM STAIN: CPT | Performed by: EMERGENCY MEDICINE

## 2024-11-04 PROCEDURE — 87040 BLOOD CULTURE FOR BACTERIA: CPT | Performed by: EMERGENCY MEDICINE

## 2024-11-04 PROCEDURE — 73562 X-RAY EXAM OF KNEE 3: CPT | Mod: RT

## 2024-11-04 PROCEDURE — 250N000011 HC RX IP 250 OP 636: Performed by: STUDENT IN AN ORGANIZED HEALTH CARE EDUCATION/TRAINING PROGRAM

## 2024-11-04 PROCEDURE — 120N000001 HC R&B MED SURG/OB

## 2024-11-04 PROCEDURE — 250N000011 HC RX IP 250 OP 636: Performed by: EMERGENCY MEDICINE

## 2024-11-04 PROCEDURE — 85025 COMPLETE CBC W/AUTO DIFF WBC: CPT | Performed by: EMERGENCY MEDICINE

## 2024-11-04 PROCEDURE — 20610 DRAIN/INJ JOINT/BURSA W/O US: CPT | Mod: RT

## 2024-11-04 RX ORDER — TRIAMCINOLONE ACETONIDE 40 MG/ML
10 INJECTION, SUSPENSION INTRA-ARTICULAR; INTRAMUSCULAR ONCE
Status: DISCONTINUED | OUTPATIENT
Start: 2024-11-04 | End: 2024-11-04

## 2024-11-04 RX ORDER — GLIPIZIDE 10 MG/1
10 TABLET, FILM COATED, EXTENDED RELEASE ORAL 2 TIMES DAILY
Status: DISCONTINUED | OUTPATIENT
Start: 2024-11-04 | End: 2024-11-08

## 2024-11-04 RX ORDER — NALOXONE HYDROCHLORIDE 0.4 MG/ML
0.2 INJECTION, SOLUTION INTRAMUSCULAR; INTRAVENOUS; SUBCUTANEOUS
Status: DISCONTINUED | OUTPATIENT
Start: 2024-11-04 | End: 2024-11-10 | Stop reason: HOSPADM

## 2024-11-04 RX ORDER — BUPIVACAINE HYDROCHLORIDE 5 MG/ML
INJECTION, SOLUTION EPIDURAL; INTRACAUDAL
Status: DISCONTINUED
Start: 2024-11-04 | End: 2024-11-04 | Stop reason: HOSPADM

## 2024-11-04 RX ORDER — AMOXICILLIN 250 MG
1 CAPSULE ORAL 2 TIMES DAILY PRN
Status: DISCONTINUED | OUTPATIENT
Start: 2024-11-04 | End: 2024-11-10 | Stop reason: HOSPADM

## 2024-11-04 RX ORDER — HYDROMORPHONE HCL IN WATER/PF 6 MG/30 ML
0.2 PATIENT CONTROLLED ANALGESIA SYRINGE INTRAVENOUS
Status: DISCONTINUED | OUTPATIENT
Start: 2024-11-04 | End: 2024-11-10

## 2024-11-04 RX ORDER — OXYCODONE HYDROCHLORIDE 5 MG/1
5 TABLET ORAL EVERY 4 HOURS PRN
Status: DISCONTINUED | OUTPATIENT
Start: 2024-11-04 | End: 2024-11-10 | Stop reason: HOSPADM

## 2024-11-04 RX ORDER — ASPIRIN 81 MG/1
81 TABLET ORAL DAILY
Status: ON HOLD | COMMUNITY
End: 2024-11-07

## 2024-11-04 RX ORDER — METFORMIN HYDROCHLORIDE 500 MG/1
1000 TABLET, EXTENDED RELEASE ORAL 2 TIMES DAILY WITH MEALS
Status: DISCONTINUED | OUTPATIENT
Start: 2024-11-04 | End: 2024-11-08

## 2024-11-04 RX ORDER — DEXTROSE MONOHYDRATE 25 G/50ML
25-50 INJECTION, SOLUTION INTRAVENOUS
Status: DISCONTINUED | OUTPATIENT
Start: 2024-11-04 | End: 2024-11-06

## 2024-11-04 RX ORDER — ONDANSETRON 2 MG/ML
4 INJECTION INTRAMUSCULAR; INTRAVENOUS EVERY 6 HOURS PRN
Status: DISCONTINUED | OUTPATIENT
Start: 2024-11-04 | End: 2024-11-05

## 2024-11-04 RX ORDER — NALOXONE HYDROCHLORIDE 0.4 MG/ML
0.4 INJECTION, SOLUTION INTRAMUSCULAR; INTRAVENOUS; SUBCUTANEOUS
Status: DISCONTINUED | OUTPATIENT
Start: 2024-11-04 | End: 2024-11-10 | Stop reason: HOSPADM

## 2024-11-04 RX ORDER — MULTIVITAMIN
1 TABLET ORAL DAILY
COMMUNITY

## 2024-11-04 RX ORDER — HYDROMORPHONE HYDROCHLORIDE 1 MG/ML
0.5 INJECTION, SOLUTION INTRAMUSCULAR; INTRAVENOUS; SUBCUTANEOUS ONCE
Status: COMPLETED | OUTPATIENT
Start: 2024-11-04 | End: 2024-11-04

## 2024-11-04 RX ORDER — FAMOTIDINE 20 MG/1
20 TABLET, FILM COATED ORAL 2 TIMES DAILY
Status: DISCONTINUED | OUTPATIENT
Start: 2024-11-04 | End: 2024-11-10 | Stop reason: HOSPADM

## 2024-11-04 RX ORDER — CALCIUM CARBONATE 500 MG/1
1000 TABLET, CHEWABLE ORAL 4 TIMES DAILY PRN
Status: DISCONTINUED | OUTPATIENT
Start: 2024-11-04 | End: 2024-11-10 | Stop reason: HOSPADM

## 2024-11-04 RX ORDER — HYDROCHLOROTHIAZIDE 25 MG/1
25 TABLET ORAL DAILY
Status: DISCONTINUED | OUTPATIENT
Start: 2024-11-05 | End: 2024-11-10 | Stop reason: HOSPADM

## 2024-11-04 RX ORDER — CEFTRIAXONE 2 G/1
2 INJECTION, POWDER, FOR SOLUTION INTRAMUSCULAR; INTRAVENOUS EVERY 24 HOURS
Status: DISCONTINUED | OUTPATIENT
Start: 2024-11-05 | End: 2024-11-10 | Stop reason: HOSPADM

## 2024-11-04 RX ORDER — HYDROCHLOROTHIAZIDE 25 MG/1
25 TABLET ORAL DAILY
COMMUNITY

## 2024-11-04 RX ORDER — NICOTINE POLACRILEX 4 MG
15-30 LOZENGE BUCCAL
Status: DISCONTINUED | OUTPATIENT
Start: 2024-11-04 | End: 2024-11-06

## 2024-11-04 RX ORDER — OXYCODONE HYDROCHLORIDE 5 MG/1
5 TABLET ORAL ONCE
Status: COMPLETED | OUTPATIENT
Start: 2024-11-04 | End: 2024-11-04

## 2024-11-04 RX ORDER — ACETAMINOPHEN 325 MG/1
650 TABLET ORAL EVERY 4 HOURS PRN
Status: DISCONTINUED | OUTPATIENT
Start: 2024-11-04 | End: 2024-11-10 | Stop reason: HOSPADM

## 2024-11-04 RX ORDER — METFORMIN HYDROCHLORIDE 500 MG/1
1000 TABLET, EXTENDED RELEASE ORAL 2 TIMES DAILY WITH MEALS
COMMUNITY

## 2024-11-04 RX ORDER — ACETAMINOPHEN 650 MG/1
650 SUPPOSITORY RECTAL EVERY 4 HOURS PRN
Status: DISCONTINUED | OUTPATIENT
Start: 2024-11-04 | End: 2024-11-10 | Stop reason: HOSPADM

## 2024-11-04 RX ORDER — LISINOPRIL 40 MG/1
40 TABLET ORAL DAILY
COMMUNITY

## 2024-11-04 RX ORDER — FAMOTIDINE 20 MG/1
20 TABLET, FILM COATED ORAL
COMMUNITY

## 2024-11-04 RX ORDER — GLIPIZIDE 10 MG/1
10 TABLET, FILM COATED, EXTENDED RELEASE ORAL 2 TIMES DAILY
Status: ON HOLD | COMMUNITY
End: 2024-11-09

## 2024-11-04 RX ORDER — POLYETHYLENE GLYCOL 3350 17 G/17G
17 POWDER, FOR SOLUTION ORAL 2 TIMES DAILY PRN
Status: DISCONTINUED | OUTPATIENT
Start: 2024-11-04 | End: 2024-11-10 | Stop reason: HOSPADM

## 2024-11-04 RX ORDER — ONDANSETRON 4 MG/1
4 TABLET, ORALLY DISINTEGRATING ORAL EVERY 6 HOURS PRN
Status: DISCONTINUED | OUTPATIENT
Start: 2024-11-04 | End: 2024-11-05

## 2024-11-04 RX ORDER — HYDROMORPHONE HCL IN WATER/PF 6 MG/30 ML
0.4 PATIENT CONTROLLED ANALGESIA SYRINGE INTRAVENOUS
Status: DISCONTINUED | OUTPATIENT
Start: 2024-11-04 | End: 2024-11-10

## 2024-11-04 RX ORDER — LISINOPRIL 40 MG/1
40 TABLET ORAL DAILY
Status: DISCONTINUED | OUTPATIENT
Start: 2024-11-05 | End: 2024-11-10 | Stop reason: HOSPADM

## 2024-11-04 RX ORDER — CEFTRIAXONE 2 G/1
2 INJECTION, POWDER, FOR SOLUTION INTRAMUSCULAR; INTRAVENOUS ONCE
Status: COMPLETED | OUTPATIENT
Start: 2024-11-04 | End: 2024-11-04

## 2024-11-04 RX ORDER — LIDOCAINE 40 MG/G
CREAM TOPICAL
Status: DISCONTINUED | OUTPATIENT
Start: 2024-11-04 | End: 2024-11-10 | Stop reason: HOSPADM

## 2024-11-04 RX ORDER — AMOXICILLIN 250 MG
2 CAPSULE ORAL 2 TIMES DAILY PRN
Status: DISCONTINUED | OUTPATIENT
Start: 2024-11-04 | End: 2024-11-10 | Stop reason: HOSPADM

## 2024-11-04 RX ORDER — LIDOCAINE HYDROCHLORIDE AND EPINEPHRINE BITARTRATE 20; .01 MG/ML; MG/ML
INJECTION, SOLUTION SUBCUTANEOUS
Status: DISCONTINUED
Start: 2024-11-04 | End: 2024-11-04 | Stop reason: HOSPADM

## 2024-11-04 RX ADMIN — OXYCODONE HYDROCHLORIDE 2.5 MG: 5 TABLET ORAL at 22:10

## 2024-11-04 RX ADMIN — HYDROMORPHONE HYDROCHLORIDE 0.5 MG: 1 INJECTION, SOLUTION INTRAMUSCULAR; INTRAVENOUS; SUBCUTANEOUS at 18:33

## 2024-11-04 RX ADMIN — VANCOMYCIN HYDROCHLORIDE 2500 MG: 5 INJECTION, POWDER, LYOPHILIZED, FOR SOLUTION INTRAVENOUS at 22:06

## 2024-11-04 RX ADMIN — ACETAMINOPHEN 650 MG: 325 TABLET, FILM COATED ORAL at 22:10

## 2024-11-04 RX ADMIN — CEFTRIAXONE 2 G: 2 INJECTION, POWDER, FOR SOLUTION INTRAMUSCULAR; INTRAVENOUS at 21:13

## 2024-11-04 RX ADMIN — OXYCODONE HYDROCHLORIDE 5 MG: 5 TABLET ORAL at 17:04

## 2024-11-04 ASSESSMENT — COLUMBIA-SUICIDE SEVERITY RATING SCALE - C-SSRS
2. HAVE YOU ACTUALLY HAD ANY THOUGHTS OF KILLING YOURSELF IN THE PAST MONTH?: NO
6. HAVE YOU EVER DONE ANYTHING, STARTED TO DO ANYTHING, OR PREPARED TO DO ANYTHING TO END YOUR LIFE?: NO
1. IN THE PAST MONTH, HAVE YOU WISHED YOU WERE DEAD OR WISHED YOU COULD GO TO SLEEP AND NOT WAKE UP?: NO

## 2024-11-04 ASSESSMENT — ACTIVITIES OF DAILY LIVING (ADL)
CONCENTRATING,_REMEMBERING_OR_MAKING_DECISIONS_DIFFICULTY: YES
ADLS_ACUITY_SCORE: 0
DRESSING/BATHING_DIFFICULTY: NO
ADLS_ACUITY_SCORE: 0
DOING_ERRANDS_INDEPENDENTLY_DIFFICULTY: NO
ADLS_ACUITY_SCORE: 0
FALL_HISTORY_WITHIN_LAST_SIX_MONTHS: NO
ADLS_ACUITY_SCORE: 0
TOILETING_ISSUES: NO
ADLS_ACUITY_SCORE: 0
WALKING_OR_CLIMBING_STAIRS_DIFFICULTY: NO
ADLS_ACUITY_SCORE: 0

## 2024-11-04 NOTE — ED PROVIDER NOTES
Emergency Department Note      History of Present Illness     Chief Complaint   Knee Pain      HPI   Scout Perez is a 54 year old male with a history of type 2 diabetes, who presents with right knee pain and swelling for approximately 12 hours. Patients reports he cannot bear weight on his right leg. Reports bilateral numbness in his feet that is chronic and unchanged. Reports his A1C has been good and his diabetes is well controlled. He states a recent dental infection where he had a fever, but denies fever today. Denies recent injury, trauma or other pain. Denies history of gout. Patient says he took Tylenol today around 2130.     Independent Historian   None    Review of External Notes       Past Medical History     Medical History and Problem List   Hypertension  Learning disability   Type 2 diabetes  Major depressive disorder  Esophageal thickening  Diabetic peripheral neuropathy  Anxiety  Insomnia  Hyperlipidemia    Medications   Amaryl  Zestril  Glucophage    Surgical History   Right percutaneous pinning hand    Physical Exam     Patient Vitals for the past 24 hrs:   BP Temp Temp src Pulse Resp SpO2 Weight   11/04/24 1910 -- -- -- -- -- 96 % --   11/04/24 1909 -- -- -- -- -- 95 % --   11/04/24 1908 -- -- -- -- -- 95 % --   11/04/24 1825 (!) 147/72 -- -- 100 -- -- --   11/04/24 1258 (!) 145/92 97.2  F (36.2  C) Temporal 103 16 98 % 126.6 kg (279 lb)     Physical Exam  HENT:      Head: Normocephalic.   Cardiovascular:      Rate and Rhythm: Normal rate and regular rhythm.   Pulmonary:      Effort: Pulmonary effort is normal.   Abdominal:      General: Abdomen is flat. Bowel sounds are normal.   Musculoskeletal:      Comments: Right knee: There is a moderate effusion palpated.  There is tenderness with very slight palpation of the right knee.  There is warmth but no erythema of the skin.   Skin:     Capillary Refill: Capillary refill takes less than 2 seconds.   Neurological:      General: No focal deficit  present.      Mental Status: He is alert.   Psychiatric:         Mood and Affect: Mood normal.           Diagnostics     Lab Results   Labs Ordered and Resulted from Time of ED Arrival to Time of ED Departure   COMPREHENSIVE METABOLIC PANEL - Abnormal       Result Value    Sodium 128 (*)     Potassium 3.9      Carbon Dioxide (CO2) 18 (*)     Anion Gap 17 (*)     Urea Nitrogen 12.5      Creatinine 0.80      GFR Estimate >90      Calcium 9.5      Chloride 93 (*)     Glucose 421 (*)     Alkaline Phosphatase 76      AST 13      ALT 42      Protein Total 7.9      Albumin 4.4      Bilirubin Total 1.5 (*)    CBC WITH PLATELETS AND DIFFERENTIAL - Abnormal    WBC Count 14.9 (*)     RBC Count 5.40      Hemoglobin 15.5      Hematocrit 41.9      MCV 78      MCH 28.7      MCHC 37.0 (*)     RDW 11.9      Platelet Count 275      % Neutrophils 81      % Lymphocytes 11      % Monocytes 8      % Eosinophils 0      % Basophils 0      % Immature Granulocytes 1      NRBCs per 100 WBC 0      Absolute Neutrophils 12.1 (*)     Absolute Lymphocytes 1.6      Absolute Monocytes 1.2      Absolute Eosinophils 0.0      Absolute Basophils 0.0      Absolute Immature Granulocytes 0.1      Absolute NRBCs 0.0     CELL COUNT BODY FLUID - Abnormal    Color Yellow      Clarity Turbid (*)     Cell Count Fluid Source Knee, Right      Total Nucleated Cells 97,500     URIC ACID - Normal    Uric Acid 5.6     DIFERENTIAL BODY FLUID    % Neutrophils 86      % Lymphocytes 9      % Monocyte/Macrophages 5     CRYSTAL ID SYNOVIAL FLUID   BLOOD CULTURE   AEROBIC BACTERIAL CULTURE ROUTINE   BLOOD CULTURE   CELL COUNT WITH DIFFERENTIAL FLUID     Imaging   XR Knee Right 3 Views   Final Result   IMPRESSION:       There are small patellofemoral compartment marginal osteophytes,   without significant knee joint space narrowing. There is a knee joint   effusion. No displaced fracture.   RUSLAN DEL VALLE MD            SYSTEM ID:  [id number]        Independent  Interpretation   X-ray right knee shows no fracture.    ED Course      Medications Administered   Medications   triamcinolone (KENALOG-40) injection 10 mg (has no administration in time range)   lidocaine 2%-EPINEPHrine 1:100,000 2 %-1:522916 injection (has no administration in time range)   BUPivacaine (PF) (MARCAINE) 0.5 % injection (has no administration in time range)   cefTRIAXone (ROCEPHIN) 2 g vial to attach to  ml bag for ADULTS or NS 50 ml bag for PEDS (has no administration in time range)   vancomycin (VANCOCIN) 2,500 mg in 0.9% NaCl 525 mL intermittent infusion (has no administration in time range)   oxyCODONE (ROXICODONE) tablet 5 mg (5 mg Oral $Given 11/4/24 1704)   HYDROmorphone (PF) (DILAUDID) injection 0.5 mg (0.5 mg Intravenous $Given 11/4/24 1833)       Procedures      Arthrocentesis     Procedure: Arthrocentesis    Indication: Joint Pain and Joint Swelling    Consent: Verbal from Patient    Universal Protocol: Universal protocol was followed and time out conducted just prior to starting procedure, confirming patient identity, site/side, procedure, patient position, and availability of correct equipment and implants.     Location: Right Knee    Preparation: Povidone-iodine    Anesthesia/Sedation: Lidocaine - 1%    Procedure Detail: The site was prepped and draped in the usual fashion.  A 18-gauge gauge needle was used via the lateral approach.  6mL appearing joint fluid was withdrawn. The fluid was cloudy.  0.5 mL of None was injected after draining the above fluid.    Patient Status: The patient tolerated the procedure well: Yes. There were no complications.       Discussion of Management   Admitting Hospitalist, Dr. Weston.  Orthopedics, Dr. Guerra.     ED Course   ED Course as of 11/04/24 2018 Mon Nov 04, 2024   1610 I obtained history and examined the patient as noted above.     1639 I rechecked the patient and explained arthrocentesis.    1730 I performed an arthrocentesis on the  patient's right knee.    1931 I spoke with Dr. Guerra, Orthopedics, regarding the patient. He will plan for a washout tomorrow.      1945 I rechecked the patient and explained findings. We discussed plan for admission and patient is in agreement with plan.      1951 I spoke with Rochelle Kaur PA-C, of the hospitalist team, regarding the patient. They will accept the patient for admission.       Additional Documentation  None    Medical Decision Making / Diagnosis     CMS Diagnoses: IV Antibiotics given and/or elevated Lactate of 0 and no sepsis note found - Delete this reminder and enter the sepsis note or '.edcms' before signing chart.>>>None    MIPS       None    MDM   Scout Perez is a 54 year old male presents with atraumatic right knee pain examination is concerning for inflammatory arthritis does give a history of fever and recent dental work.  Blood culture sent due to pain of unclear etiology arthrocentesis was recommended and positive for pyogenic arthritis.  Care was discussed with the orthopedist recommend admission IV antibiotics and right knee washout.  Care was discussed with the hospitalist and was admitted as an inpatient.    Disposition   The patient was admitted to the hospital.     Diagnosis     ICD-10-CM    1. Pyogenic arthritis of right knee joint, due to unspecified organism (H)  M00.9            Scribe Disclosure:  I, Elke Lainez, am serving as a scribe at 4:04 PM on 11/4/2024 to document services personally performed by Arvin Cisneros MD based on my observations and the provider's statements to me.        Arvin Cisneros MD  11/04/24 9548

## 2024-11-04 NOTE — ED TRIAGE NOTES
Patient presents to ED accompanied by mother d/t R knee pain. Seen at  and sent to ED for further evaluation. States pain developed at 3 am. Denies traumatic injuries. R knee appears to be swollen, red and warm to touch. Difficult with ROM d/t pain      Took tylenol at 0930

## 2024-11-05 ENCOUNTER — ANESTHESIA EVENT (OUTPATIENT)
Dept: SURGERY | Facility: CLINIC | Age: 54
End: 2024-11-05
Payer: COMMERCIAL

## 2024-11-05 ENCOUNTER — ANESTHESIA (OUTPATIENT)
Dept: SURGERY | Facility: CLINIC | Age: 54
End: 2024-11-05
Payer: COMMERCIAL

## 2024-11-05 LAB
ANION GAP SERPL CALCULATED.3IONS-SCNC: 11 MMOL/L (ref 7–15)
BACTERIA SPEC CULT: NORMAL
BACTERIA SPEC CULT: NORMAL
BUN SERPL-MCNC: 10.7 MG/DL (ref 6–20)
CALCIUM SERPL-MCNC: 9 MG/DL (ref 8.8–10.4)
CHLORIDE SERPL-SCNC: 96 MMOL/L (ref 98–107)
CREAT SERPL-MCNC: 0.71 MG/DL (ref 0.67–1.17)
CRP SERPL-MCNC: 195.48 MG/L
EGFRCR SERPLBLD CKD-EPI 2021: >90 ML/MIN/1.73M2
ERYTHROCYTE [DISTWIDTH] IN BLOOD BY AUTOMATED COUNT: 12 % (ref 10–15)
GLUCOSE BLDC GLUCOMTR-MCNC: 276 MG/DL (ref 70–99)
GLUCOSE BLDC GLUCOMTR-MCNC: 288 MG/DL (ref 70–99)
GLUCOSE BLDC GLUCOMTR-MCNC: 292 MG/DL (ref 70–99)
GLUCOSE BLDC GLUCOMTR-MCNC: 298 MG/DL (ref 70–99)
GLUCOSE BLDC GLUCOMTR-MCNC: 310 MG/DL (ref 70–99)
GLUCOSE BLDC GLUCOMTR-MCNC: 367 MG/DL (ref 70–99)
GLUCOSE BLDC GLUCOMTR-MCNC: 390 MG/DL (ref 70–99)
GLUCOSE SERPL-MCNC: 265 MG/DL (ref 70–99)
GRAM STAIN RESULT: NORMAL
HCO3 SERPL-SCNC: 22 MMOL/L (ref 22–29)
HCT VFR BLD AUTO: 38.8 % (ref 40–53)
HGB BLD-MCNC: 14.1 G/DL (ref 13.3–17.7)
MCH RBC QN AUTO: 29 PG (ref 26.5–33)
MCHC RBC AUTO-ENTMCNC: 36.3 G/DL (ref 31.5–36.5)
MCV RBC AUTO: 80 FL (ref 78–100)
PLATELET # BLD AUTO: 225 10E3/UL (ref 150–450)
POTASSIUM SERPL-SCNC: 3.6 MMOL/L (ref 3.4–5.3)
RBC # BLD AUTO: 4.86 10E6/UL (ref 4.4–5.9)
SODIUM SERPL-SCNC: 129 MMOL/L (ref 135–145)
WBC # BLD AUTO: 12.2 10E3/UL (ref 4–11)

## 2024-11-05 PROCEDURE — 250N000025 HC SEVOFLURANE, PER MIN: Performed by: ORTHOPAEDIC SURGERY

## 2024-11-05 PROCEDURE — 250N000011 HC RX IP 250 OP 636

## 2024-11-05 PROCEDURE — 710N000009 HC RECOVERY PHASE 1, LEVEL 1, PER MIN: Performed by: ORTHOPAEDIC SURGERY

## 2024-11-05 PROCEDURE — 250N000011 HC RX IP 250 OP 636: Performed by: PHYSICIAN ASSISTANT

## 2024-11-05 PROCEDURE — 250N000012 HC RX MED GY IP 250 OP 636 PS 637: Performed by: ANESTHESIOLOGY

## 2024-11-05 PROCEDURE — 360N000076 HC SURGERY LEVEL 3, PER MIN: Performed by: ORTHOPAEDIC SURGERY

## 2024-11-05 PROCEDURE — 0SBC0ZZ EXCISION OF RIGHT KNEE JOINT, OPEN APPROACH: ICD-10-PCS | Performed by: ORTHOPAEDIC SURGERY

## 2024-11-05 PROCEDURE — 99222 1ST HOSP IP/OBS MODERATE 55: CPT | Performed by: INTERNAL MEDICINE

## 2024-11-05 PROCEDURE — 258N000003 HC RX IP 258 OP 636

## 2024-11-05 PROCEDURE — 86618 LYME DISEASE ANTIBODY: CPT | Performed by: INTERNAL MEDICINE

## 2024-11-05 PROCEDURE — 120N000001 HC R&B MED SURG/OB

## 2024-11-05 PROCEDURE — 250N000009 HC RX 250: Performed by: NURSE ANESTHETIST, CERTIFIED REGISTERED

## 2024-11-05 PROCEDURE — 370N000017 HC ANESTHESIA TECHNICAL FEE, PER MIN: Performed by: ORTHOPAEDIC SURGERY

## 2024-11-05 PROCEDURE — 250N000011 HC RX IP 250 OP 636: Performed by: ANESTHESIOLOGY

## 2024-11-05 PROCEDURE — 250N000009 HC RX 250: Performed by: PHYSICIAN ASSISTANT

## 2024-11-05 PROCEDURE — 250N000013 HC RX MED GY IP 250 OP 250 PS 637

## 2024-11-05 PROCEDURE — 250N000012 HC RX MED GY IP 250 OP 636 PS 637: Performed by: PHYSICIAN ASSISTANT

## 2024-11-05 PROCEDURE — 87075 CULTR BACTERIA EXCEPT BLOOD: CPT | Performed by: ORTHOPAEDIC SURGERY

## 2024-11-05 PROCEDURE — 258N000003 HC RX IP 258 OP 636: Performed by: PHYSICIAN ASSISTANT

## 2024-11-05 PROCEDURE — 250N000009 HC RX 250: Performed by: ORTHOPAEDIC SURGERY

## 2024-11-05 PROCEDURE — 250N000011 HC RX IP 250 OP 636: Performed by: NURSE ANESTHETIST, CERTIFIED REGISTERED

## 2024-11-05 PROCEDURE — 85014 HEMATOCRIT: CPT | Performed by: PHYSICIAN ASSISTANT

## 2024-11-05 PROCEDURE — 272N000001 HC OR GENERAL SUPPLY STERILE: Performed by: ORTHOPAEDIC SURGERY

## 2024-11-05 PROCEDURE — 99233 SBSQ HOSP IP/OBS HIGH 50: CPT | Performed by: INTERNAL MEDICINE

## 2024-11-05 PROCEDURE — 87070 CULTURE OTHR SPECIMN AEROBIC: CPT | Performed by: ORTHOPAEDIC SURGERY

## 2024-11-05 PROCEDURE — 36415 COLL VENOUS BLD VENIPUNCTURE: CPT | Performed by: PHYSICIAN ASSISTANT

## 2024-11-05 PROCEDURE — 250N000013 HC RX MED GY IP 250 OP 250 PS 637: Performed by: ANESTHESIOLOGY

## 2024-11-05 PROCEDURE — 80048 BASIC METABOLIC PNL TOTAL CA: CPT | Performed by: PHYSICIAN ASSISTANT

## 2024-11-05 PROCEDURE — 999N000141 HC STATISTIC PRE-PROCEDURE NURSING ASSESSMENT: Performed by: ORTHOPAEDIC SURGERY

## 2024-11-05 PROCEDURE — 250N000013 HC RX MED GY IP 250 OP 250 PS 637: Performed by: PHYSICIAN ASSISTANT

## 2024-11-05 PROCEDURE — 86140 C-REACTIVE PROTEIN: CPT | Performed by: INTERNAL MEDICINE

## 2024-11-05 PROCEDURE — 87205 SMEAR GRAM STAIN: CPT | Performed by: ORTHOPAEDIC SURGERY

## 2024-11-05 RX ORDER — PROCHLORPERAZINE MALEATE 5 MG/1
10 TABLET ORAL EVERY 6 HOURS PRN
Status: DISCONTINUED | OUTPATIENT
Start: 2024-11-05 | End: 2024-11-10 | Stop reason: HOSPADM

## 2024-11-05 RX ORDER — LIDOCAINE 40 MG/G
CREAM TOPICAL
Status: DISCONTINUED | OUTPATIENT
Start: 2024-11-05 | End: 2024-11-05

## 2024-11-05 RX ORDER — FENTANYL CITRATE 50 UG/ML
INJECTION, SOLUTION INTRAMUSCULAR; INTRAVENOUS PRN
Status: DISCONTINUED | OUTPATIENT
Start: 2024-11-05 | End: 2024-11-05

## 2024-11-05 RX ORDER — FENTANYL CITRATE 50 UG/ML
50 INJECTION, SOLUTION INTRAMUSCULAR; INTRAVENOUS EVERY 5 MIN PRN
Status: DISCONTINUED | OUTPATIENT
Start: 2024-11-05 | End: 2024-11-05 | Stop reason: HOSPADM

## 2024-11-05 RX ORDER — ALBUTEROL SULFATE 0.83 MG/ML
2.5 SOLUTION RESPIRATORY (INHALATION) EVERY 4 HOURS PRN
Status: DISCONTINUED | OUTPATIENT
Start: 2024-11-05 | End: 2024-11-05 | Stop reason: HOSPADM

## 2024-11-05 RX ORDER — DEXAMETHASONE SODIUM PHOSPHATE 4 MG/ML
4 INJECTION, SOLUTION INTRA-ARTICULAR; INTRALESIONAL; INTRAMUSCULAR; INTRAVENOUS; SOFT TISSUE
Status: DISCONTINUED | OUTPATIENT
Start: 2024-11-05 | End: 2024-11-05 | Stop reason: HOSPADM

## 2024-11-05 RX ORDER — DEXAMETHASONE SODIUM PHOSPHATE 4 MG/ML
INJECTION, SOLUTION INTRA-ARTICULAR; INTRALESIONAL; INTRAMUSCULAR; INTRAVENOUS; SOFT TISSUE PRN
Status: DISCONTINUED | OUTPATIENT
Start: 2024-11-05 | End: 2024-11-05

## 2024-11-05 RX ORDER — ACETAMINOPHEN 325 MG/1
975 TABLET ORAL ONCE
Status: COMPLETED | OUTPATIENT
Start: 2024-11-05 | End: 2024-11-05

## 2024-11-05 RX ORDER — CEFAZOLIN SODIUM 2 G/100ML
2 INJECTION, SOLUTION INTRAVENOUS EVERY 8 HOURS
Status: DISCONTINUED | OUTPATIENT
Start: 2024-11-05 | End: 2024-11-05

## 2024-11-05 RX ORDER — PROPOFOL 10 MG/ML
INJECTION, EMULSION INTRAVENOUS PRN
Status: DISCONTINUED | OUTPATIENT
Start: 2024-11-05 | End: 2024-11-05

## 2024-11-05 RX ORDER — HYDROMORPHONE HCL IN WATER/PF 6 MG/30 ML
0.2 PATIENT CONTROLLED ANALGESIA SYRINGE INTRAVENOUS EVERY 5 MIN PRN
Status: DISCONTINUED | OUTPATIENT
Start: 2024-11-05 | End: 2024-11-05 | Stop reason: HOSPADM

## 2024-11-05 RX ORDER — ONDANSETRON 2 MG/ML
INJECTION INTRAMUSCULAR; INTRAVENOUS PRN
Status: DISCONTINUED | OUTPATIENT
Start: 2024-11-05 | End: 2024-11-05

## 2024-11-05 RX ORDER — DEXTROSE MONOHYDRATE 25 G/50ML
25-50 INJECTION, SOLUTION INTRAVENOUS
Status: DISCONTINUED | OUTPATIENT
Start: 2024-11-05 | End: 2024-11-05

## 2024-11-05 RX ORDER — ASPIRIN 81 MG/1
81 TABLET ORAL 2 TIMES DAILY
Status: DISCONTINUED | OUTPATIENT
Start: 2024-11-05 | End: 2024-11-10 | Stop reason: HOSPADM

## 2024-11-05 RX ORDER — LABETALOL HYDROCHLORIDE 5 MG/ML
10 INJECTION, SOLUTION INTRAVENOUS
Status: DISCONTINUED | OUTPATIENT
Start: 2024-11-05 | End: 2024-11-05 | Stop reason: HOSPADM

## 2024-11-05 RX ORDER — KETOROLAC TROMETHAMINE 30 MG/ML
INJECTION, SOLUTION INTRAMUSCULAR; INTRAVENOUS PRN
Status: DISCONTINUED | OUTPATIENT
Start: 2024-11-05 | End: 2024-11-05

## 2024-11-05 RX ORDER — CEFAZOLIN SODIUM/WATER 2 G/20 ML
2 SYRINGE (ML) INTRAVENOUS SEE ADMIN INSTRUCTIONS
Status: DISCONTINUED | OUTPATIENT
Start: 2024-11-05 | End: 2024-11-05 | Stop reason: HOSPADM

## 2024-11-05 RX ORDER — ONDANSETRON 4 MG/1
4 TABLET, ORALLY DISINTEGRATING ORAL EVERY 30 MIN PRN
Status: DISCONTINUED | OUTPATIENT
Start: 2024-11-05 | End: 2024-11-05 | Stop reason: HOSPADM

## 2024-11-05 RX ORDER — SODIUM CHLORIDE, SODIUM LACTATE, POTASSIUM CHLORIDE, CALCIUM CHLORIDE 600; 310; 30; 20 MG/100ML; MG/100ML; MG/100ML; MG/100ML
INJECTION, SOLUTION INTRAVENOUS CONTINUOUS
Status: DISCONTINUED | OUTPATIENT
Start: 2024-11-05 | End: 2024-11-05 | Stop reason: HOSPADM

## 2024-11-05 RX ORDER — LIDOCAINE 40 MG/G
CREAM TOPICAL
Status: DISCONTINUED | OUTPATIENT
Start: 2024-11-05 | End: 2024-11-05 | Stop reason: HOSPADM

## 2024-11-05 RX ORDER — NICOTINE POLACRILEX 4 MG
15-30 LOZENGE BUCCAL
Status: DISCONTINUED | OUTPATIENT
Start: 2024-11-05 | End: 2024-11-05

## 2024-11-05 RX ORDER — ONDANSETRON 2 MG/ML
4 INJECTION INTRAMUSCULAR; INTRAVENOUS EVERY 30 MIN PRN
Status: DISCONTINUED | OUTPATIENT
Start: 2024-11-05 | End: 2024-11-05 | Stop reason: HOSPADM

## 2024-11-05 RX ORDER — MAGNESIUM HYDROXIDE 1200 MG/15ML
LIQUID ORAL PRN
Status: DISCONTINUED | OUTPATIENT
Start: 2024-11-05 | End: 2024-11-05 | Stop reason: HOSPADM

## 2024-11-05 RX ORDER — ONDANSETRON 4 MG/1
4 TABLET, ORALLY DISINTEGRATING ORAL EVERY 6 HOURS PRN
Status: DISCONTINUED | OUTPATIENT
Start: 2024-11-05 | End: 2024-11-10 | Stop reason: HOSPADM

## 2024-11-05 RX ORDER — ONDANSETRON 2 MG/ML
4 INJECTION INTRAMUSCULAR; INTRAVENOUS EVERY 6 HOURS PRN
Status: DISCONTINUED | OUTPATIENT
Start: 2024-11-05 | End: 2024-11-10 | Stop reason: HOSPADM

## 2024-11-05 RX ORDER — LIDOCAINE HYDROCHLORIDE 20 MG/ML
INJECTION, SOLUTION INFILTRATION; PERINEURAL PRN
Status: DISCONTINUED | OUTPATIENT
Start: 2024-11-05 | End: 2024-11-05

## 2024-11-05 RX ORDER — GLYCOPYRROLATE 0.2 MG/ML
INJECTION, SOLUTION INTRAMUSCULAR; INTRAVENOUS PRN
Status: DISCONTINUED | OUTPATIENT
Start: 2024-11-05 | End: 2024-11-05

## 2024-11-05 RX ORDER — DIAZEPAM 10 MG/2ML
2.5 INJECTION, SOLUTION INTRAMUSCULAR; INTRAVENOUS
Status: DISCONTINUED | OUTPATIENT
Start: 2024-11-05 | End: 2024-11-05 | Stop reason: HOSPADM

## 2024-11-05 RX ORDER — FENTANYL CITRATE 50 UG/ML
25 INJECTION, SOLUTION INTRAMUSCULAR; INTRAVENOUS EVERY 5 MIN PRN
Status: DISCONTINUED | OUTPATIENT
Start: 2024-11-05 | End: 2024-11-05 | Stop reason: HOSPADM

## 2024-11-05 RX ORDER — DIPHENHYDRAMINE HCL 25 MG
25 CAPSULE ORAL EVERY 6 HOURS PRN
Status: DISCONTINUED | OUTPATIENT
Start: 2024-11-05 | End: 2024-11-05 | Stop reason: HOSPADM

## 2024-11-05 RX ORDER — NALOXONE HYDROCHLORIDE 0.4 MG/ML
0.1 INJECTION, SOLUTION INTRAMUSCULAR; INTRAVENOUS; SUBCUTANEOUS
Status: DISCONTINUED | OUTPATIENT
Start: 2024-11-05 | End: 2024-11-05 | Stop reason: HOSPADM

## 2024-11-05 RX ORDER — DIPHENHYDRAMINE HYDROCHLORIDE 50 MG/ML
25 INJECTION INTRAMUSCULAR; INTRAVENOUS EVERY 6 HOURS PRN
Status: DISCONTINUED | OUTPATIENT
Start: 2024-11-05 | End: 2024-11-05 | Stop reason: HOSPADM

## 2024-11-05 RX ORDER — SODIUM CHLORIDE, SODIUM LACTATE, POTASSIUM CHLORIDE, CALCIUM CHLORIDE 600; 310; 30; 20 MG/100ML; MG/100ML; MG/100ML; MG/100ML
INJECTION, SOLUTION INTRAVENOUS CONTINUOUS
Status: DISCONTINUED | OUTPATIENT
Start: 2024-11-05 | End: 2024-11-06

## 2024-11-05 RX ORDER — PROPOFOL 10 MG/ML
INJECTION, EMULSION INTRAVENOUS CONTINUOUS PRN
Status: DISCONTINUED | OUTPATIENT
Start: 2024-11-05 | End: 2024-11-05

## 2024-11-05 RX ORDER — SODIUM CHLORIDE, SODIUM LACTATE, POTASSIUM CHLORIDE, CALCIUM CHLORIDE 600; 310; 30; 20 MG/100ML; MG/100ML; MG/100ML; MG/100ML
INJECTION, SOLUTION INTRAVENOUS CONTINUOUS PRN
Status: DISCONTINUED | OUTPATIENT
Start: 2024-11-05 | End: 2024-11-05

## 2024-11-05 RX ORDER — CEFAZOLIN SODIUM/WATER 2 G/20 ML
2 SYRINGE (ML) INTRAVENOUS
Status: COMPLETED | OUTPATIENT
Start: 2024-11-05 | End: 2024-11-05

## 2024-11-05 RX ORDER — TRANEXAMIC ACID 10 MG/ML
1 INJECTION, SOLUTION INTRAVENOUS ONCE
Status: DISCONTINUED | OUTPATIENT
Start: 2024-11-05 | End: 2024-11-05 | Stop reason: HOSPADM

## 2024-11-05 RX ORDER — TRANEXAMIC ACID 10 MG/ML
1 INJECTION, SOLUTION INTRAVENOUS ONCE
Status: COMPLETED | OUTPATIENT
Start: 2024-11-05 | End: 2024-11-05

## 2024-11-05 RX ORDER — HYDROMORPHONE HCL IN WATER/PF 6 MG/30 ML
0.4 PATIENT CONTROLLED ANALGESIA SYRINGE INTRAVENOUS EVERY 5 MIN PRN
Status: DISCONTINUED | OUTPATIENT
Start: 2024-11-05 | End: 2024-11-05 | Stop reason: HOSPADM

## 2024-11-05 RX ORDER — HYDRALAZINE HYDROCHLORIDE 20 MG/ML
2.5-5 INJECTION INTRAMUSCULAR; INTRAVENOUS EVERY 10 MIN PRN
Status: DISCONTINUED | OUTPATIENT
Start: 2024-11-05 | End: 2024-11-05 | Stop reason: HOSPADM

## 2024-11-05 RX ADMIN — DEXAMETHASONE SODIUM PHOSPHATE 8 MG: 4 INJECTION, SOLUTION INTRA-ARTICULAR; INTRALESIONAL; INTRAMUSCULAR; INTRAVENOUS; SOFT TISSUE at 11:35

## 2024-11-05 RX ADMIN — FAMOTIDINE 20 MG: 20 TABLET, FILM COATED ORAL at 21:03

## 2024-11-05 RX ADMIN — HYDROMORPHONE HYDROCHLORIDE 1 MG: 1 INJECTION, SOLUTION INTRAMUSCULAR; INTRAVENOUS; SUBCUTANEOUS at 11:49

## 2024-11-05 RX ADMIN — GLYCOPYRROLATE 0.2 MG: 0.2 INJECTION, SOLUTION INTRAMUSCULAR; INTRAVENOUS at 11:35

## 2024-11-05 RX ADMIN — PROPOFOL 200 MG: 10 INJECTION, EMULSION INTRAVENOUS at 11:35

## 2024-11-05 RX ADMIN — TRANEXAMIC ACID 1 G: 10 INJECTION, SOLUTION INTRAVENOUS at 11:33

## 2024-11-05 RX ADMIN — LIDOCAINE HYDROCHLORIDE 50 MG: 20 INJECTION, SOLUTION INFILTRATION; PERINEURAL at 11:35

## 2024-11-05 RX ADMIN — KETOROLAC TROMETHAMINE 30 MG: 30 INJECTION, SOLUTION INTRAMUSCULAR at 11:35

## 2024-11-05 RX ADMIN — OXYCODONE HYDROCHLORIDE 2.5 MG: 5 TABLET ORAL at 22:46

## 2024-11-05 RX ADMIN — ASPIRIN 81 MG: 81 TABLET, COATED ORAL at 21:03

## 2024-11-05 RX ADMIN — CEFTRIAXONE 2 G: 2 INJECTION, POWDER, FOR SOLUTION INTRAMUSCULAR; INTRAVENOUS at 21:01

## 2024-11-05 RX ADMIN — OXYCODONE HYDROCHLORIDE 2.5 MG: 5 TABLET ORAL at 13:05

## 2024-11-05 RX ADMIN — PROPOFOL 50 MCG/KG/MIN: 10 INJECTION, EMULSION INTRAVENOUS at 11:35

## 2024-11-05 RX ADMIN — SODIUM CHLORIDE, POTASSIUM CHLORIDE, SODIUM LACTATE AND CALCIUM CHLORIDE: 600; 310; 30; 20 INJECTION, SOLUTION INTRAVENOUS at 14:30

## 2024-11-05 RX ADMIN — FENTANYL CITRATE 100 MCG: 50 INJECTION INTRAMUSCULAR; INTRAVENOUS at 11:35

## 2024-11-05 RX ADMIN — ONDANSETRON 4 MG: 2 INJECTION INTRAMUSCULAR; INTRAVENOUS at 11:35

## 2024-11-05 RX ADMIN — VANCOMYCIN HYDROCHLORIDE 1500 MG: 10 INJECTION, POWDER, LYOPHILIZED, FOR SOLUTION INTRAVENOUS at 10:48

## 2024-11-05 RX ADMIN — ACETAMINOPHEN 975 MG: 325 TABLET, FILM COATED ORAL at 13:05

## 2024-11-05 RX ADMIN — VANCOMYCIN HYDROCHLORIDE 1500 MG: 10 INJECTION, POWDER, LYOPHILIZED, FOR SOLUTION INTRAVENOUS at 22:46

## 2024-11-05 RX ADMIN — Medication 2 G: at 11:24

## 2024-11-05 RX ADMIN — INSULIN ASPART 5 UNITS: 100 INJECTION, SOLUTION INTRAVENOUS; SUBCUTANEOUS at 13:23

## 2024-11-05 RX ADMIN — OXYCODONE HYDROCHLORIDE 2.5 MG: 5 TABLET ORAL at 16:05

## 2024-11-05 RX ADMIN — HYDROCHLOROTHIAZIDE 25 MG: 25 TABLET ORAL at 08:00

## 2024-11-05 RX ADMIN — INSULIN ASPART 5 UNITS: 100 INJECTION, SOLUTION INTRAVENOUS; SUBCUTANEOUS at 00:12

## 2024-11-05 RX ADMIN — FAMOTIDINE 20 MG: 20 TABLET, FILM COATED ORAL at 08:00

## 2024-11-05 RX ADMIN — SODIUM CHLORIDE, POTASSIUM CHLORIDE, SODIUM LACTATE AND CALCIUM CHLORIDE: 600; 310; 30; 20 INJECTION, SOLUTION INTRAVENOUS at 11:24

## 2024-11-05 RX ADMIN — MIDAZOLAM 2 MG: 1 INJECTION INTRAMUSCULAR; INTRAVENOUS at 11:28

## 2024-11-05 NOTE — PLAN OF CARE
"CARE FROM [id number]    Goal Outcome Evaluation:      Plan of Care Reviewed With: patient    Overall Patient Progress: improvingOverall Patient Progress: improving    Outcome Evaluation: A&O x4. VSS, RA. Up w/ A1-- has not been oob at this time. R knee elevated. Pain managed w/ tyl and oxy. NPO at midnight for ortho.    Problem: Adult Inpatient Plan of Care  Goal: Plan of Care Review  Description: The Plan of Care Review/Shift note should be completed every shift.  The Outcome Evaluation is a brief statement about your assessment that the patient is improving, declining, or no change.  This information will be displayed automatically on your shift  note.  Outcome: Progressing  Flowsheets (Taken 11/4/2024 2243)  Outcome Evaluation: A&O x4. VSS, RA. Up w/ A1-- has not been oob at this time. R knee elevated. Pain managed w/ tyl and oxy. NPO at midnight for ortho.  Plan of Care Reviewed With: patient  Overall Patient Progress: improving  Goal: Patient-Specific Goal (Individualized)  Description: You can add care plan individualizations to a care plan. Examples of Individualization might be:  \"Parent requests to be called daily at 9am for status\", \"I have a hard time hearing out of my right ear\", or \"Do not touch me to wake me up as it startles  me\".  Outcome: Progressing  Goal: Absence of Hospital-Acquired Illness or Injury  Outcome: Progressing  Intervention: Prevent Infection  Recent Flowsheet Documentation  Taken 11/4/2024 2200 by Ellen Cheney, RN  Infection Prevention:   hand hygiene promoted   rest/sleep promoted   single patient room provided  Goal: Optimal Comfort and Wellbeing  Outcome: Progressing  Goal: Readiness for Transition of Care  Outcome: Progressing  Intervention: Mutually Develop Transition Plan  Recent Flowsheet Documentation  Taken 11/4/2024 2116 by Ellen Cheney, RN  Equipment Currently Used at Home: none     "

## 2024-11-05 NOTE — H&P
[facility]    History and Physical - Hospitalist Service       Date of Admission:  11/4/2024    Assessment & Plan      Scout Perez is a 54 year old male with PMHx significant for HTN, DM type II, polyneuropathy and GERD, who was admitted on 11/4/2024 with concerns for R knee septic arthritis.    1. R knee septic arthritis  Sudden onset of R knee pain, swelling and erythema. No joint hardware, notes recent dental infection. Hx of DM, HgbA1c 8.7 ten days ago.  R knee aspirated in ED, 97,500 nucleated cells, culture and gram stain pending.  Delay in gram stain results so in order to not delay treatment, he was given IV Rocephin and Vanco in ED.  - admit to inpatient  - continue IV Rocephin and Vanco for now  - follow gram stain and culture  - Orthopedic surgery consult, sounds like they are planning for I&D tomorrow  - NPO after midnight  - pain control and supportive cares  - NWB for comfort    2. DM type II  Polyneuropathy   Glucose 421 in ED, last HgbA1c 8.7 on 10/23/24. Per chart review, pt has low health literacy. He has been on metformin and glipizide, Jardiance was started ~10 days ago. Per pt, he stopped this due to some issues with reflux, unclear.  - resume but hold metformin and glipizide as he will be NPO after midnight  - cover with sliding scale insulin   - hypoglycemia protocol  - hold Jardiance for now    3. HTN  Managed with Lisinopril and hydrochlorothiazide.  - resume but hold home Lisinopril for surgery tomorrow  - resume hydrochlorothiazide with parameters  - await med rec    4. GERD  Recently started on Famotidine by PCP, did not tolerate PPI  - resume PO famotidine         Diet:  regular, NPO after midnight  DVT Prophylaxis: Pneumatic Compression Devices  Choudhury Catheter: Not present  Lines: None     Cardiac Monitoring: None  Code Status:  full code    Clinically Significant Risk Factors Present on Admission         # Hyponatremia: Lowest Na = 128 mmol/L in last 2 days,  will monitor as appropriate  # Hypochloremia: Lowest Cl = 93 mmol/L in last 2 days, will monitor as appropriate          # Hypertension: Noted on problem list                      Disposition Plan     Medically Ready for Discharge: Anticipated in 2-4 Days         The patient's care was discussed with the Attending Physician, Dr. Weston, Patient, and ED provider, Dr. Cisneros .    Rochelle Kaur PA-C  Hospitalist Service  [facility]  Securely message with [employer] (more info)  Text page via [facility] Paging/Directory     ______________________________________________________________________    Chief Complaint   R knee pain    History is obtained from the patient    History of Present Illness   Scout Perez is a 54 year old male with PMHx significant for HTN, DM type II, polyneuropathy and GERD, who presents to the ED with R knee pain. He states he woke up this morning with severe R knee pain, swelling and erythema. He had a difficult time bearing weight on his right knee. He denies any injury or trauma to the R knee. He does note having a dental procedure with a couple teeth pulled about 2 months ago. Since that time, he has had subjective fevers and has felt general malaise on and off. He denies prior surgery on the right knee. He also notes intermittent chest pains and abdominal pain with nausea that he is addressing with his PCP.       Past Medical History    Past Medical History:   Diagnosis Date    Benign essential hypertension     Learning disability     Type 2 diabetes mellitus (H)        Past Surgical History   No past surgical history on file.    Prior to Admission Medications   Prior to Admission Medications   Prescriptions Last Dose Informant Patient Reported? Taking?   blood glucose (NO BRAND SPECIFIED) test strip   No No   Sig: Use to test blood sugar 4 times daily or as directed.   blood glucose monitoring (NO BRAND SPECIFIED) meter device kit   No No   Sig: Use to test blood  sugar 4 times daily or as directed.   glimepiride (AMARYL) 2 MG tablet   No No   Sig: Take 1 tablet (2 mg) by mouth every morning (before breakfast)   lisinopril (ZESTRIL) 10 MG tablet   No No   Sig: Take 1 tablet (10 mg) by mouth daily   metFORMIN (GLUCOPHAGE) 1000 MG tablet   No No   Sig: Take 0.5 tablets (500 mg) by mouth 2 times daily (with meals) for 7 days, THEN 1 tablet (1,000 mg) 2 times daily (with meals) for 30 days.   thin (NO BRAND SPECIFIED) lancets   No No   Sig: Use with lanceting device.      Facility-Administered Medications: None           Physical Exam   Vital Signs: Temp: 97.2  F (36.2  C) Temp src: Temporal BP: (!) 147/72 Pulse: 100   Resp: 16 SpO2: 96 %      Weight: 279 lbs 0 oz    GENERAL:  Comfortable.  PSYCH: pleasant, oriented, No acute distress.  HEENT:  Atraumatic, normocephalic. Normal conjunctiva, normal hearing, and oropharynx is normal.  NECK:  Supple, no neck vein distention  HEART:  Normal S1, S2 with no murmur, no pericardial rub, gallops or S3 or S4.  LUNGS:  Clear to auscultation, normal Respiratory effort. No wheezing, rales or ronchi.  GI:  Soft, normal bowel sounds. Non-tender, non distended.   EXTREMITIES:  No pedal edema, +2 pulses bilateral and equal. Mild erythema to the R knee, mild swelling noted, tender to palpation  SKIN:  Dry to touch, No rash, wound or ulcerations.  NEUROLOGIC:  CN 2-12 intact, BL 5/5 symmetric upper and lower extremity strength, sensation is intact with no focal deficits.      Medical Decision Making       65 MINUTES SPENT BY ME on the date of service doing chart review, history, exam, documentation & further activities per the note.      Data     I have personally reviewed the following data over the past 24 hrs:    14.9 (H)  \   15.5   / 275     128 (L) 93 (L) 12.5 /  421 (H)   3.9 18 (L) 0.80 \     ALT: 42 AST: 13 AP: 76 TBILI: 1.5 (H)   ALB: 4.4 TOT PROTEIN: 7.9 LIPASE: N/A       Imaging results reviewed over the past 24 hrs:   Recent Results  (from the past 24 hours)   XR Knee Right 3 Views    Narrative    XR KNEE RIGHT 3 VIEWS   11/4/2024 1:50 PM     HISTORY: knee pain  COMPARISON: None.       Impression    IMPRESSION:     There are small patellofemoral compartment marginal osteophytes,  without significant knee joint space narrowing. There is a knee joint  effusion. No displaced fracture.  RUSLAN DEL VALLE MD         SYSTEM ID:  [id number]

## 2024-11-05 NOTE — PHARMACY-ADMISSION MEDICATION HISTORY
Pharmacist Admission Medication History    Admission medication history is complete. The information provided in this note is only as accurate as the sources available at the time of the update.    Information Source(s): Patient via in-person and Rx bottles    Pertinent Information: none    Changes made to PTA medication list:  Added: all  Deleted: amaryl  Changed: lisinopril and metformin    Allergies reviewed with patient and updates made in EHR: yes    Medication History Completed By: Gabriel Alvarez RPH 11/4/2024 8:55 PM    PTA Med List   Medication Sig Last Dose/Taking    aspirin 81 MG EC tablet Take 81 mg by mouth daily. 11/4/2024 Morning    famotidine (PEPCID) 20 MG tablet Take 20 mg by mouth 2 times daily (before meals). 11/4/2024 Morning    glipiZIDE (GLUCOTROL XL) 10 MG 24 hr tablet Take 10 mg by mouth 2 times daily. 11/4/2024 Morning    hydrochlorothiazide (HYDRODIURIL) 25 MG tablet Take 25 mg by mouth daily. 11/4/2024 Morning    lisinopril (ZESTRIL) 40 MG tablet Take 40 mg by mouth daily. 11/4/2024 Morning    metFORMIN (GLUCOPHAGE XR) 500 MG 24 hr tablet Take 1,000 mg by mouth 2 times daily (with meals). 11/4/2024 Morning    multivitamin w/minerals (MULTI-VITAMIN) tablet Take 1 tablet by mouth daily. 11/4/2024 Morning

## 2024-11-05 NOTE — CONSULTS
[facility]    Infectious Disease Consultation     Date of Admission:  11/4/2024  Date of Consult (When I saw the patient): 11/05/24    Assessment & Plan   Scout Perez is a 54 year old male who was admitted on 11/4/2024.     Impression: 1 54-year-old male, acute new right knee pain, tap consistent with infection, status post operative I&D, cultures all pending  2 vague low-grade fever and systemic symptoms for several weeks before knee pain, question bacteremic or other underlying disease, prominent the differential diagnosis here is Lyme disease  3 diabetes mellitus  4 right maxillary tooth extraction preceding symptoms without obvious infection or antibiotics    REC 1 continue Vanco and ceftriaxone, await all microbiology, get Lyme serology        Silvino Love MD    Reason for Consult   Reason for consult: I was asked to evaluate this patient for right knee infection.    Primary Care Physician   [facility]    Chief Complaint   Right knee pain    History is obtained from the patient and medical records    History of Present Illness   Scout Perez is a 54 year old male who presents with no particular major medical history except for underlying diabetes.  No history of major infections.  He had onset about 4 to 5 weeks ago of intermittent low-grade fever some chest discomfort and vague weakness and malaise without clear other major symptoms.  This all followed in apparent dental extraction with possible infection although of note no antibiotics were given by the dentist.  The patient otherwise has not really had localizing symptoms and specifically has not been having joint symptoms until onset of the acute new pain that led to this hospitalization.  Tap of the knee had near 100,000 white cells with left shift cultures are all pending including blood cultures done before antibiotics    Notable travel history has a cabin in the [place] area that he is at  regularly including preceding this time.  Definite outdoor exposure but no clear tick bites.    Past Medical History   I have reviewed this patient's medical history and updated it with pertinent information if needed.   Past Medical History:   Diagnosis Date    Benign essential hypertension     Learning disability     Type 2 diabetes mellitus (H)        Past Surgical History   I have reviewed this patient's surgical history and updated it with pertinent information if needed.  History reviewed. No pertinent surgical history.    Prior to Admission Medications   Prior to Admission Medications   Prescriptions Last Dose Informant Patient Reported? Taking?   aspirin 81 MG EC tablet 11/4/2024 Morning  Yes Yes   Sig: Take 81 mg by mouth daily.   blood glucose (NO BRAND SPECIFIED) test strip   No No   Sig: Use to test blood sugar 4 times daily or as directed.   blood glucose monitoring (NO BRAND SPECIFIED) meter device kit   No No   Sig: Use to test blood sugar 4 times daily or as directed.   famotidine (PEPCID) 20 MG tablet 11/4/2024 Morning  Yes Yes   Sig: Take 20 mg by mouth 2 times daily (before meals).   glipiZIDE (GLUCOTROL XL) 10 MG 24 hr tablet 11/4/2024 Morning  Yes Yes   Sig: Take 10 mg by mouth 2 times daily.   hydrochlorothiazide (HYDRODIURIL) 25 MG tablet 11/4/2024 Morning  Yes Yes   Sig: Take 25 mg by mouth daily.   lisinopril (ZESTRIL) 40 MG tablet 11/4/2024 Morning  Yes Yes   Sig: Take 40 mg by mouth daily.   metFORMIN (GLUCOPHAGE XR) 500 MG 24 hr tablet 11/4/2024 Morning  Yes Yes   Sig: Take 1,000 mg by mouth 2 times daily (with meals).   multivitamin w/minerals (MULTI-VITAMIN) tablet 11/4/2024 Morning  Yes Yes   Sig: Take 1 tablet by mouth daily.      Facility-Administered Medications: None     Allergies   No Known Allergies    Immunization History   Immunization History   Administered Date(s) Administered    COVID-19 12+ (MODERNA) 09/30/2024    COVID-19 12+ (Pfizer) 10/25/2023    COVID-19 Bivalent 12+  (Pfizer) 10/15/2022    COVID-19 MONOVALENT 12+ (Pfizer) 03/31/2021, 04/21/2021, 10/22/2021    COVID-19 Monovalent 12+ (Pfizer 2022) 05/14/2022    Flu, Unspecified 10/22/2010, 10/15/2016    Hepatitis B, Adult 01/01/2009, 03/01/2009, 09/01/2009    Influenza (IIV3) PF 10/01/2009, 10/17/2016    Influenza Vaccine 18-64 (Flublok) 10/22/2021, 10/15/2022    Influenza Vaccine >6 months,quad, PF 11/05/2013, 10/13/2014, 10/19/2015, 10/04/2018, 11/03/2019    Influenza Vaccine IM Ages 6-35 Months 4 Valent (PF) 11/05/2013, 10/23/2017    Influenza Vaccine, 6+MO IM (QUADRIVALENT W/PRESERVATIVES) 11/03/2019    Influenza, seasonal, injectable, PF 10/10/2011, 10/16/2012    Influenza,INJ,MDCK,PF,Quad >6mo(Flucelvax) 09/28/2020    Pneumococcal 23 valent 11/08/2010    TD,PF 7+ (Tenivac) 04/22/2016    TDAP Vaccine (Adacel) 04/13/2005    Zoster recombinant adjuvanted (SHINGRIX) 08/24/2021       Social History   I have reviewed this patient's social history and updated it with pertinent information if needed. Scout Perez  reports that he has never smoked. He has never used smokeless tobacco. He reports that he does not currently use alcohol. He reports that he does not use drugs.    Family History   I have reviewed this patient's family history and updated it with pertinent information if needed.   Family History   Problem Relation Age of Onset    Hypertension Mother     Heart Disease Mother     Mental Illness Father     Kidney Disease Father     Cerebrovascular Disease Father     Myocardial Infarction Father     Diabetes Brother     Alcoholism Brother     Diabetes Paternal Grandfather         Type 1    Diabetes Niece         Type 1    Coronary Artery Disease Early Onset Cousin        Review of Systems   The 10 point Review of Systems is negative    Physical Exam   Temp: 98.7  F (37.1  C) Temp src: Oral BP: (!) 153/90 Pulse: 85   Resp: 20 SpO2: 95 % O2 Device: None (Room air) Oxygen Delivery: 2 LPM  Vital Signs with Ranges  Temp:  [97.2  " F (36.2  C)-99.8  F (37.7  C)] 98.7  F (37.1  C)  Pulse:  [] 85  Resp:  [16-22] 20  BP: (124-160)/(72-94) 153/90  SpO2:  [94 %-100 %] 95 %  260 lbs 2.28 oz  Body mass index is 33.4 kg/m .    GENERAL APPEARANCE:  awake  EYES: Eyes grossly normal to inspection  NECK: no adenopathy  RESP: lungs clear   CV: regular rates and rhythm  LYMPHATICS: normal ant/post cervical and supraclavicular nodes  ABDOMEN: soft, nontender  MS: extremities normal  SKIN: no suspicious lesions or rashes  Knee wrapped postop      Data   All laboratory and imaging data in the past 24 hours reviewed  No results for input(s): \"CULT\" in the last 168 hours.  No lab results found.    Invalid input(s): \"UC\"       All cultures:  Recent Labs   Lab 11/04/24  1828 11/04/24  1324   CULTURE No growth after 12 hours No growth after 12 hours      Blood culture:  Results for orders placed or performed during the hospital encounter of 11/04/24   Blood Culture Peripheral Blood    Collection Time: 11/04/24  6:28 PM    Specimen: Peripheral Blood   Result Value Ref Range    Culture No growth after 12 hours    Blood Culture Hand, Right    Collection Time: 11/04/24  1:24 PM    Specimen: Hand, Right; Blood   Result Value Ref Range    Culture No growth after 12 hours    Results for orders placed or performed during the hospital encounter of 06/20/23   Blood Culture Peripheral Blood    Collection Time: 06/20/23  1:30 PM    Specimen: Peripheral Blood   Result Value Ref Range    Culture No Growth    Blood Culture Peripheral Blood    Collection Time: 06/20/23  1:30 PM    Specimen: Peripheral Blood   Result Value Ref Range    Culture No Growth       Urine culture:  No results found for this or any previous visit.          " Yes

## 2024-11-05 NOTE — ANESTHESIA PREPROCEDURE EVALUATION
Anesthesia Pre-Procedure Evaluation    Patient: Scout Perez   MRN: [id number] : 1970        Procedure : Procedure(s):  RIGHT KNEE OPEN IRRIGATION AND DEBRIDEMENT          Past Medical History:   Diagnosis Date    Benign essential hypertension     Learning disability     Type 2 diabetes mellitus (H)       No past surgical history on file.   No Known Allergies   Social History     Tobacco Use    Smoking status: Never    Smokeless tobacco: Never   Substance Use Topics    Alcohol use: Not Currently      Wt Readings from Last 1 Encounters:   24 118 kg (260 lb 2.3 oz)        Anesthesia Evaluation   Pt has had prior anesthetic. Type: General.    No history of anesthetic complications       ROS/MED HX  ENT/Pulmonary:  - neg pulmonary ROS     Neurologic:     (+)                         Developmental delay,       Cardiovascular:     (+)  hypertension- -   -  - -                                      METS/Exercise Tolerance:     Hematologic:  - neg hematologic  ROS     Musculoskeletal: Comment: Septic right knee joint      GI/Hepatic:  - neg GI/hepatic ROS     Renal/Genitourinary:       Endo:     (+)  type II DM,   Not using insulin, - not using insulin pump.                Psychiatric/Substance Use:  - neg psychiatric ROS     Infectious Disease:  - neg infectious disease ROS     Malignancy:  - neg malignancy ROS     Other:  - neg other ROS          Physical Exam    Airway        Mallampati: II   TM distance: > 3 FB   Neck ROM: full   Mouth opening: > 3 cm    Respiratory Devices and Support         Dental           Cardiovascular   cardiovascular exam normal       Rhythm and rate: regular and normal     Pulmonary   pulmonary exam normal        breath sounds clear to auscultation       Other findings: Lab Test        11/05/24     11/04/24     08/15/23                       0544          1324          0553          WBC          12.2*        14.9*        6.4           HGB          14.1         15.5         14.5           MCV          80           78           82            PLT          225          275          213            Lab Test        11/05/24     11/05/24     11/05/24     11/05/24     11/04/24     11/04/24     08/15/23     08/15/23                       0928          0743          0638          0544          2250          1324          0617          0595          NA            --           --           --          129*          --          128*          --          140           POTASSIUM     --           --           --          3.6           --          3.9           --          4.1           CHLORIDE      --           --           --          96*           --          93*           --          106           CO2           --           --           --          22            --          18*           --          22            BUN           --           --           --          10.7          --          12.5          --          11.0          CR            --           --           --          0.71          --          0.80          --          0.64*         ANIONGAP      --           --           --          11            --          17*           --          12            ARELY           --           --           --          9.0           --          9.5           --          8.9           GLC          292*         310*         288*         265*           < >        421*           < >        155*           < > = values in this interval not displayed.                        EKG Interpretation:       OUTSIDE LABS:  CBC:   Lab Results   Component Value Date    WBC 12.2 (H) 11/05/2024    WBC 14.9 (H) 11/04/2024    HGB 14.1 11/05/2024    HGB 15.5 11/04/2024    HCT 38.8 (L) 11/05/2024    HCT 41.9 11/04/2024     11/05/2024     11/04/2024     BMP:   Lab Results   Component Value Date     (L) 11/05/2024     (L) 11/04/2024    POTASSIUM 3.6 11/05/2024    POTASSIUM 3.9 11/04/2024    CHLORIDE 96 (L)  "11/05/2024    CHLORIDE 93 (L) 11/04/2024    CO2 22 11/05/2024    CO2 18 (L) 11/04/2024    BUN 10.7 11/05/2024    BUN 12.5 11/04/2024    CR 0.71 11/05/2024    CR 0.80 11/04/2024     (H) 11/05/2024     (H) 11/05/2024     COAGS: No results found for: \"PTT\", \"INR\", \"FIBR\"  POC: No results found for: \"BGM\", \"HCG\", \"HCGS\"  HEPATIC:   Lab Results   Component Value Date    ALBUMIN 4.4 11/04/2024    PROTTOTAL 7.9 11/04/2024    ALT 42 11/04/2024    AST 13 11/04/2024    ALKPHOS 76 11/04/2024    BILITOTAL 1.5 (H) 11/04/2024     OTHER:   Lab Results   Component Value Date    LACT 2.0 06/21/2023    A1C 8.8 (H) 08/14/2023    ARELY 9.0 11/05/2024    MAG 2.2 08/15/2023    LIPASE 15 06/20/2023    TSH 2.36 08/14/2023    SED 4 08/04/2023       Anesthesia Plan    ASA Status:  2    NPO Status:  NPO Appropriate    Anesthesia Type: General.     - Airway: LMA   Induction: Intravenous.   Maintenance: Balanced.        Consents    Anesthesia Plan(s) and associated risks, benefits, and realistic alternatives discussed. Questions answered and patient/representative(s) expressed understanding.     - Discussed: Risks, Benefits and Alternatives for BOTH SEDATION and the PROCEDURE were discussed     - Discussed with:  Patient      - Extended Intubation/Ventilatory Support Discussed: No.      - Patient is DNR/DNI Status: No     Use of blood products discussed: No .     Postoperative Care    Pain management: IV analgesics, Oral pain medications, Multi-modal analgesia.   PONV prophylaxis: Ondansetron (or other 5HT-3), Dexamethasone or Solumedrol, Background Propofol Infusion     Comments:               Ben Hicks MD    I have reviewed the pertinent notes and labs in the chart from the past 30 days and (re)examined the patient.  Any updates or changes from those notes are reflected in this note.     # Hyponatremia: Lowest Na = 128 mmol/L in last 2 days, will monitor as appropriate  # Hypochloremia: Lowest Cl = 93 mmol/L in last 2 " days, will monitor as appropriate        # Drug Induced Platelet Defect: home medication list includes an antiplatelet medication   # Hypertension: Noted on problem list

## 2024-11-05 NOTE — ANESTHESIA POSTPROCEDURE EVALUATION
Patient: Scout Perez    Procedure: Procedure(s):  RIGHT KNEE OPEN IRRIGATION AND DEBRIDEMENT       Anesthesia Type:  General    Note:  Disposition: Inpatient   Postop Pain Control: Uneventful            Sign Out: Well controlled pain   PONV: No   Neuro/Psych: Uneventful            Sign Out: Acceptable/Baseline neuro status   Airway/Respiratory: Uneventful            Sign Out: Acceptable/Baseline resp. status   CV/Hemodynamics: Uneventful            Sign Out: Acceptable CV status; No obvious hypovolemia; No obvious fluid overload   Other NRE: NONE   DID A NON-ROUTINE EVENT OCCUR? No           Last vitals:  Vitals Value Taken Time   /81 11/05/24 1245   Temp 97.52  F (36.4  C) 11/05/24 1256   Pulse 81 11/05/24 1256   Resp 85 11/05/24 1256   SpO2 94 % 11/05/24 1256   Vitals shown include unfiled device data.    Electronically Signed By: Ben Hicks MD  November 5, 2024  12:57 PM

## 2024-11-05 NOTE — PHARMACY-VANCOMYCIN DOSING SERVICE
Pharmacy Vancomycin Initial Note  Date of Service 2024  Patient's  1970  54 year old, male    Indication: Bone and Joint Infection    Current estimated CrCl = CrCl cannot be calculated (Unknown ideal weight.).    Creatinine for last 3 days  2024:  1:24 PM Creatinine 0.80 mg/dL    Recent Vancomycin Level(s) for last 3 days  No results found for requested labs within last 3 days.      Vancomycin IV Administrations (past 72 hours)                     vancomycin (VANCOCIN) 2,500 mg in 0.9% NaCl 525 mL intermittent infusion (mg) 2,500 mg New Bag 24                    Nephrotoxins and other renal medications (From now, onward)      Start     Dose/Rate Route Frequency Ordered Stop    24 1000  vancomycin (VANCOCIN) 1,500 mg in 0.9% NaCl 265 mL intermittent infusion         1,500 mg  over 90 Minutes Intravenous EVERY 12 HOURS 24 0800  [Held by provider]  lisinopril (ZESTRIL) tablet 40 mg        (On hold since today at  until manually unheld; held by Rochelle Kaur PA-CHold Reason: NPO)   Note to Pharmacy: PTA Sig:Take 1 tablet (10 mg) by mouth daily      40 mg Oral DAILY 24  vancomycin (VANCOCIN) 2,500 mg in 0.9% NaCl 525 mL intermittent infusion         2,500 mg  over 120 Minutes Intravenous ONCE 24              Contrast Orders - past 72 hours (72h ago, onward)      None            [employer] Prediction of Planned Initial Vancomycin Regimen  Loading dose: 2500 mg IV x1  Regimen: 1500 mg IV every 12 hours.  Start time: 10:06 on 2024  Exposure target: AUC24 (range)400-600 mg/L.hr   AUC24,ss: 551 mg/L.hr  Probability of AUC24 > 400: 81 %  Ctrough,ss: 17.6 mg/L  Probability of Ctrough,ss > 20: 40 %  Probability of nephrotoxicity (Lodise ROSETTA ): 13 %        Plan:  Start vancomycin  1500 mg IV q12h.   Vancomycin monitoring method: AUC  Vancomycin therapeutic monitoring goal: 400-600 mg*h/L  Pharmacy will  check vancomycin levels as appropriate in 1-3 Days.    Serum creatinine levels will be ordered daily for the first week of therapy and at least twice weekly for subsequent weeks.      Lonny Mcmillan RPH

## 2024-11-05 NOTE — ANESTHESIA PROCEDURE NOTES
Airway       Patient location during procedure: OR       Procedure Start/Stop Times: 11/5/2024 11:37 AM  Staff -        Anesthesiologist:  Ben Hicks MD       CRNA: Sia Gardner APRN CRNA       Performed By: CRNAIndications and Patient Condition       Indications for airway management: zain-procedural       Induction type:intravenous       Mask difficulty assessment: 0 - not attempted    Final Airway Details       Final airway type: supraglottic airway    Supraglottic Airway Details        Type: LMA       Brand: I-Gel       LMA size: 5    Post intubation assessment        Placement verified by: capnometry, equal breath sounds and chest rise        Number of attempts at approach: 1       Number of other approaches attempted: 0       Secured with: commercial tube vasquez       Ease of procedure: easy       Dentition: Intact and Unchanged    Medication(s) Administered   Medication Administration Time: 11/5/2024 11:37 AM

## 2024-11-05 NOTE — CONSULTS
[facility]    ORTHOPEDIC CONSULTATION NOTE     Patient: Scout Perez 54 year oldmale   Admit Date: 11/4/2024          Today's Date: 11/5/2024  Attending: Suellen Weston DO HPI  The patient is a 54 year old male who initially presented yesterday with right knee pain for the past few weeks or so.  He endorses getting 2 teeth pulled proximally mid September.  He was not given any antibiotics, and notes some healing issues with the sockets afterwards.  He did not feel terribly well thereafter, unable to explain this further though states he felt a little ill.  Knee pain has been rather insidious, denies any falls, trauma, or other injury.  No skin wounds or lesions.  No prior issues with the right knee.  Radiographs demonstrate no osseous abnormalities.  The ER did aspirate which was consistent with 97,000 white cells, 86% neutrophils with turbid appearing fluid per ER doctor when I spoke with him that it appeared purulent.  No crystals.  He was subsequently admitted for further workup and surgical planning.    Medical History  Past Medical History:   Diagnosis Date    Benign essential hypertension     Learning disability     Type 2 diabetes mellitus (H)        Surgical History  History reviewed. No pertinent surgical history.    Medications  Current Facility-Administered Medications   Medication Dose Route Frequency Provider Last Rate Last Admin    [Auto Hold] acetaminophen (TYLENOL) tablet 650 mg  650 mg Oral Q4H PRN Rochelle Kaur PA-C   650 mg at 11/04/24 2210    Or    [Auto Hold] acetaminophen (TYLENOL) Suppository 650 mg  650 mg Rectal Q4H PRN Rochelle Kaur PA-C        [Auto Hold] calcium carbonate (TUMS) chewable tablet 1,000 mg  1,000 mg Oral 4x Daily PRN Rochelle Kaur PA-C        ceFAZolin Sodium (ANCEF) injection 2 g  2 g Intravenous Pre-Op/Pre-procedure x 1 dose Palmira Kenyon PA-C        ceFAZolin Sodium (ANCEF) injection 2 g  2 g Intravenous  See Admin Instructions Palmira Kenyon PA-C        [Auto Hold] cefTRIAXone (ROCEPHIN) 2 g vial to attach to  ml bag for ADULTS or NS 50 ml bag for PEDS  2 g Intravenous Q24H Rochelle Kaur PA-C        [Auto Hold] glucose gel 15-30 g  15-30 g Oral Q15 Min PRN Rochelle Kaur PA-C        Or    [Auto Hold] dextrose 50 % injection 25-50 mL  25-50 mL Intravenous Q15 Min PRN Rochelle Kaur PA-C        Or    [Auto Hold] glucagon injection 1 mg  1 mg Subcutaneous Q15 Min PRN Rochelle Kaur PA-C        [Auto Hold] famotidine (PEPCID) tablet 20 mg  20 mg Oral BID Rochelle aKur PA-C   20 mg at 11/05/24 0800    [Held by provider] glipiZIDE (GLUCOTROL XL) 24 hr tablet 10 mg  10 mg Oral BID Rochelle Kaur PA-C        [Held by provider] hydrochlorothiazide (HYDRODIURIL) tablet 25 mg  25 mg Oral Daily Rochelle Kaur PA-C   25 mg at 11/05/24 0800    [Auto Hold] HYDROmorphone (DILAUDID) injection 0.2 mg  0.2 mg Intravenous Q2H PRN Rochelle Kaur PA-C        [Auto Hold] HYDROmorphone (DILAUDID) injection 0.4 mg  0.4 mg Intravenous Q2H PRN Rochelle Kaur PA-C        [Auto Hold] insulin aspart (NovoLOG) injection (RAPID ACTING)  1-12 Units Subcutaneous Q4H Alpesh Corona DO   7 Units at 11/05/24 0929    lactated ringers infusion   Intravenous Continuous Huy Orona MD        lidocaine (LMX4) cream   Topical Once PRN Ben Hicks MD        lidocaine (LMX4) cream   Topical Q1H PRN Ben Hicks MD        lidocaine (LMX4) cream   Topical Q1H PRN Huy Orona MD        [Auto Hold] lidocaine (LMX4) cream   Topical Q1H PRN Rochelle Kaur PA-C        lidocaine 1 % 0.1-1 mL  0.1-1 mL Other Q1H PRN Ben Hicks MD        lidocaine 1 % 0.1-1 mL  0.1-1 mL Other Q1H PRN Huy Orona MD        [Auto Hold] lidocaine 1 % 0.1-1 mL  0.1-1 mL Other Q1H PRN Rochelle Kaur PA-C        [Held by  provider] lisinopril (ZESTRIL) tablet 40 mg  40 mg Oral Daily Rochelle Kaur PA-C        [Held by provider] metFORMIN (GLUCOPHAGE XR) 24 hr tablet 1,000 mg  1,000 mg Oral BID w/meals Rochelle Kaur PA-C        [Auto Hold] naloxone (NARCAN) injection 0.2 mg  0.2 mg Intravenous Q2 Min PRN Rochelle Kaur PA-C        Or    [Auto Hold] naloxone (NARCAN) injection 0.4 mg  0.4 mg Intravenous Q2 Min PRN Rochelle Kaur PA-C        Or    [Auto Hold] naloxone (NARCAN) injection 0.2 mg  0.2 mg Intramuscular Q2 Min PRN Rochelle Kaur PA-C        Or    [Auto Hold] naloxone (NARCAN) injection 0.4 mg  0.4 mg Intramuscular Q2 Min PRN Rochelle Kaur PA-C        [Auto Hold] ondansetron (ZOFRAN ODT) ODT tab 4 mg  4 mg Oral Q6H PRN Rochelle Kaur PA-C        Or    [Auto Hold] ondansetron (ZOFRAN) injection 4 mg  4 mg Intravenous Q6H PRN Rochelle Kaur PA-C        [Auto Hold] oxyCODONE (ROXICODONE) tablet 5 mg  5 mg Oral Q4H PRN Rochelle Kaur PA-C        [Auto Hold] oxyCODONE IR (ROXICODONE) half-tab 2.5 mg  2.5 mg Oral Q4H PRN Rochelle Kaur PA-C   2.5 mg at 11/04/24 2210    [Auto Hold] polyethylene glycol (MIRALAX) Packet 17 g  17 g Oral BID PRN Rochelle Kaur PA-C        [Auto Hold] senna-docusate (SENOKOT-S/PERICOLACE) 8.6-50 MG per tablet 1 tablet  1 tablet Oral BID PRN Rochelle Kaur PA-C        Or    [Auto Hold] senna-docusate (SENOKOT-S/PERICOLACE) 8.6-50 MG per tablet 2 tablet  2 tablet Oral BID PRN Kingzett, Rochelle Sharifa, PA-C        sodium chloride (PF) 0.9% PF flush 10-20 mL  10-20 mL Intravenous Q1H PRN Ben Hicks MD        sodium chloride (PF) 0.9% PF flush 3 mL  3 mL Intracatheter Q8H Ben Hicks MD        sodium chloride (PF) 0.9% PF flush 3 mL  3 mL Intracatheter q1 min prn Ben Hicks MD        sodium chloride (PF) 0.9% PF flush 3 mL  3 mL Intracatheter Q8H Huy Orona MD   3 mL at 11/05/24  1053    sodium chloride (PF) 0.9% PF flush 3 mL  3 mL Intracatheter q1 min prn Huy Orona MD        [Auto Hold] sodium chloride (PF) 0.9% PF flush 3 mL  3 mL Intracatheter Q8H Rochelle Kaur PA-C   3 mL at 11/05/24 0455    [Auto Hold] sodium chloride (PF) 0.9% PF flush 3 mL  3 mL Intracatheter q1 min prn Rochelle Kaur PA-C        tranexamic acid 1 g in 100 mL NS IV bag (premix)  1 g Intravenous Once Palmira Kenyon PA-C        tranexamic acid 1 g in 100 mL NS IV bag (premix)  1 g Intravenous Once Palmira Kenyon PA-C        [Auto Hold] vancomycin (VANCOCIN) 1,500 mg in 0.9% NaCl 265 mL intermittent infusion  1,500 mg Intravenous Q12H Rochelle Kaur PA-C   1,500 mg at 11/05/24 1048       Allergies  No Known Allergies     Family History  Family History   Problem Relation Age of Onset    Hypertension Mother     Heart Disease Mother     Mental Illness Father     Kidney Disease Father     Cerebrovascular Disease Father     Myocardial Infarction Father     Diabetes Brother     Alcoholism Brother     Diabetes Paternal Grandfather         Type 1    Diabetes Niece         Type 1    Coronary Artery Disease Early Onset Cousin        Social History  Social History     Socioeconomic History    Marital status: Single     Spouse name: None    Number of children: None    Years of education: None    Highest education level: None   Tobacco Use    Smoking status: Never    Smokeless tobacco: Never   Vaping Use    Vaping status: Never Used   Substance and Sexual Activity    Alcohol use: Not Currently    Drug use: Never    Sexual activity: Not Currently     Social Drivers of Health     Financial Resource Strain: Low Risk  (11/4/2024)    Financial Resource Strain     Within the past 12 months, have you or your family members you live with been unable to get utilities (heat, electricity) when it was really needed?: No   Food Insecurity: Low Risk  (11/4/2024)    Food Insecurity     Within the past  12 months, did you worry that your food would run out before you got money to buy more?: No     Within the past 12 months, did the food you bought just not last and you didn t have money to get more?: No   Transportation Needs: Low Risk  (11/4/2024)    Transportation Needs     Within the past 12 months, has lack of transportation kept you from medical appointments, getting your medicines, non-medical meetings or appointments, work, or from getting things that you need?: No    Received from [facility], [facility]    Social Connections   Interpersonal Safety: Low Risk  (11/4/2024)    Interpersonal Safety     Do you feel physically and emotionally safe where you currently live?: Yes     Within the past 12 months, have you been hit, slapped, kicked or otherwise physically hurt by someone?: No     Within the past 12 months, have you been humiliated or emotionally abused in other ways by your partner or ex-partner?: No   Housing Stability: Low Risk  (11/4/2024)    Housing Stability     Do you have housing? : Yes     Are you worried about losing your housing?: No       ROS  Right knee pain. All other systems were reviewed and found to be negative    Physical Exam  BP (!) 160/91 (BP Location: Left arm)   Pulse 97   Temp 97.2  F (36.2  C) (Core)   Resp 18   Wt 118 kg (260 lb 2.3 oz)   SpO2 97%   BMI 33.40 kg/m    General: appears to be in no acute distress, afebrile, nondiaphoretic, nontoxic appearing, little nervous this morning, he endorses to me that he has a learning disability and time communication becomes difficult, especially in a stressful situation  Right lower extremity swollen knee, effusion, skin intact otherwise, knee held in a somewhat flexed position, rather marked pain with any motion flexion/extension, no dependent edema, no calf pain, denies any numbness or tingling, able to demonstrate dorsiflexion plantarflexion and EHL, skin is  warm and well-perfused.  Brief exam without any additional joint involvement of the ipsilateral limb, contralateral or upper extremities at current.  Again note, he is quite apprehensive and anxious in preop, nervous for surgery.    Imaging  Radiographs of the right knee demonstrate no fractures, dislocations, or osseous pathology.    Laboratory Values  Lab Results   Component Value Date    WBC 12.2 (H) 11/05/2024    HGB 14.1 11/05/2024    HCT 38.8 (L) 11/05/2024    MCV 80 11/05/2024     11/05/2024     Lab Results   Component Value Date     (L) 11/05/2024    CO2 22 11/05/2024    BUN 10.7 11/05/2024         PROBLEMS:     Principal Problem:    Pyogenic arthritis of right knee joint (H)  Active Problems:    Pyogenic arthritis of right knee joint, due to unspecified organism (H)      ASSESSMENT AND PLAN:      54 year old male with approximate 2 to 3 weeks worth of right knee pain.  His history is significant for tooth extraction x 2 mid September, no antibiotics were utilized, he notes some wound healing issues of the socket.  Any further information is unclear.  He admits to some feelings of fever and chills within the past few days, though has been afebrile since admission.  -ER aspirate consistent with 97,000 white cells, 86% neutrophils, no crystals.  I discussed treatment options with Cricket and did recommend formal irrigation and debridement of what appears to be a septic right knee joint, native.  His history is suspicious for possible seeding of the joint secondary to dental procedure done mid-September, he admits to feeling slightly ill thereafter, though knee pain began insidiously a couple weeks ago.  I did review risks benefits and alternatives to surgery today, involving a formal open arthrotomy of the right knee joint, washout, likely drain and IV antibiotics thereafter per infectious disease recommendations and tailored for cultures.  His questions were answered, in agreement to proceeding,  no family members present.  -Appreciate medical clearance, labs reviewed.  White blood cell count 14.9 hemoglobin is 15.5 and platelet count 275.  .  Glucose is 265.  Sodium 129, potassium 3.6, creatinine normal  -I recommended operative treatment of his right knee septic joint with formal irrigation and debridement. Risks, benefits, alternatives, and anticipated postoperative course were discussed. Risks of surgery include, but are not limited to, bleeding, recurrent infection possibly requiring additional surgery, infection elsewhere or seeding of other joints/body parts, wound healing complications, neurovascular injury, pain, stiffness, advancing arthritis secondary to cartilage destruction, thromboembolic phenomena, heart attack, stroke, anesthetic and medical complications, and death. Benefits of surgery were discussed including improved chance of infection eradication and restoration of function, mobility.  We also discussed therapeutic alternatives to surgery, including non-operative management, which I did not recommend. They understand these risks and benefits and wish to proceed. Please see operative note for detailed post-operative plan, including post-op weightbearing status.     I have explained in a language understandable to the patient or substitute decision maker:    - The procedure the patient is having and why they need it  - The possible risks of the procedure  - Reasonable alternatives to this procedure, the relevant risks, benefits, and uncertainties  - The significant risks and problems specific to this patient     I have given the patient/substitute decision-maker an opportunity to:  - Ask questions about any of the above information  - Raise any other concerns     I believe the patient/substitute decision-maker understood the above information    Steve Guerra DO, MSc  11/5/2024 11:00 AM

## 2024-11-05 NOTE — OP NOTE
ORTHOPEDIC OPERATIVE REPORT    Patient Name:  Scout Perez 54 year old male  :  1970  MR Number:   [id number]  CSN Number:   [id number]    Date of Service: 2024 11:28 AM    Surgeon:  Steve Guerra DO    Assistant(s):   JAYDEN Reed PA-C    OR Staff:  Circulator: Jonnathan Westbrook RN  Scrub Person: Massiel Orona    Preoperative Diagnosis:  Right knee septic arthritis     Postoperative Diagnosis:  Same as above    Procedure(s):  Right knee arthrotomy  Irrigation debridement right knee for infection, deepest layer synovium    Anesthesia Type:  General    Estimated Blood Loss:  25cc    Specimens: knee swabs taken x2 for culture     Drain(s), Tube(s), Packing: hemovac drain, sewn in    Complications:  none    Implants:   * No implants in log *           Indications:  The patient is a 54 year old male who initially presented after having right knee pain for 2 to 3 weeks or so.  He has a recent history of teeth extraction x 2 mid September.  He did not feel well shortly after that with insidious onset of right knee pain.  He did have a little bit of issues with healing up of his sockets from tooth extraction although seems to have healed at this point.  The right knee was aspirated in the ER upon presentation which demonstrated purulent fluid and elevated white count and high neutrophils.  The patient was admitted for further workup perioperative clearance and surgical planning.  I met with Scout this morning, discussed treatment options, and recommended formal irrigation and debridement of his right knee for septic arthritis.  I discussed what this would entail, risks benefits and alternatives.  We also discussed the possibility of worsening infection, recurrent infection, infection elsewhere, possible alternate surgeries etc.  Currently does not appear septic, he is not diaphoretic, and is afebrile.  He endorses feeling feverish at home however.  Labs all reviewed.  Cleared for  surgery.  Plan to proceed OR this morning.    Procedure in Detail:  The patient was greeted in the preoperative holding area, identified as the correct patient, and the informed consent was reviewed. The operative extremity was marked, and they were taken back to the operating room. General anesthesia was administered and he was transferred the operating table in the supine position all bony prominences were well-padded.  A bump was placed under the right buttock for extremity positioning followed by bone foam.  Right lower extremity was clipped of hair over the anterior knee, the extremity was cleansed and dried with alcohol.  The right leg was then prepped and draped in usual sterile fashion.  A procedural pause was conducted to verify correct patient, correct extremity, presence of the surgeons initials on the operative extremity, and administration of IV antibiotics. Following generalized agreement, we proceeded by marking out an anterior incision about the knee centered about the patella.  The extremity was elevated, exsanguinated, tourniquet insufflated to 200 mmHg for 18 minutes.  Skin was incised sharply elevating the medial skin flap.  A medial parapatellar arthrotomy was performed sharply and there was immediate expression of copious purulence from the joint.  This was sampled twice and sent for culture swabs.  The joint was copiously irrigated with sterile saline utilizing pulse lavage.  A digital sweep was undertaken of the joint and the suprapatellar pouch to ensure all loculations had been broken up and there were no additional areas of fluid collection or infection.  Excisional debridement was carried out in the suprapatellar pouch with a rongeur and a Metzenbaum scissor, excising some synovium.  The knee was irrigated taking it through range of motion to access all aspects.  Once satisfied gloves were changed, drapes were covered.  Hemovac drain was placed through the wound out the superolateral  patellar pouch and sewn in.  The knee arthrotomy was closed with interrupted #1 PDS, 2-0 Monocryl in the subcutaneous layer and staples for the skin.  Skin was cleansed and dried and sterile dressings applied consisting of Xeroform 4 x 4's soft cast padding and Ace wrap.  The patient was then turned over to anesthesia for transfer to PACU and extubation.    Consuelo Sanchez PA-C was needed for and participated in all the aspects of the case, including preoperative preparation and positioning of the patient, intraoperative manipulation of the limb, retraction of soft tissues, protection of vital structures, suctioning of bodily fluids and wound closure as well as dressing application.    Postoperative Plan:  Activity / weight bearing: WBAT RLE, ice and elevate for pain and swelling, maintain dressings for now and monitor drain output-will pull when slows, PT/OT as needed  Antibiotics: perioperative Ancef, will tailor per culture results  Anticoagulation VTE ppx: mechanical SCDs, pharmacologic ASA 81 twice daily  Analgesia: multimodal regimen  Discharge planning: pending culture results and antibiotic regimen   Follow up: 2 weeks in office for the wound check, staple removal      Steve Guerra DO MSc  11/5/2024  3:04 PM

## 2024-11-05 NOTE — PROGRESS NOTES
[facility]    Medicine Progress Note - Hospitalist Service    Date of Admission:  11/4/2024    Assessment & Plan   Scout Perez is a 54 year old male with PMHx significant for HTN, DM type II, polyneuropathy and GERD, who was admitted on 11/4/2024 with concerns for R knee septic arthritis.     R knee septic arthritis  Recent dental procedure/possible infection, reported fevers in October:  Sudden onset of R knee pain, swelling and erythema. No joint hardware, notes recent dental infection. Hx of DM, HgbA1c 8.7 ten days ago.  R knee aspirated in ED, 97,500 nucleated cells, culture and gram stain pending.  Delay in gram stain results so in order to not delay treatment, he was given IV Rocephin and Vanco in ED.    - Concern over possible seeding.   Patient with vague dental issues since September.  He had teeth extraction.  Reports not on antibiotics.  Had some intermittent flushed/fever episodes in October with temp 101 by his report.  Dental issues had somewhat improved but then right knee issue developed as above.      - continue IV Rocephin and Vanco for now.  No crystals seen on exam of fluid.  Await pending cultures.   ID consult given history, concern he was bacteremic in October and possibly seeded right knee given vague recurrent fever/dental complaints last month.    -  Orthopedic surgery consult, keep NPO as probable I&D.  - pain control and supportive cares  - NWB for comfort until cleared for more activity by orthopedics  - Outpatient dental follow-up, no overt worsening major swelling/discharge noted now.     DM type II, poor control increasing infection risk  Polyneuropathy:  Glucose 421 in ED, last HgbA1c 8.7 on 10/23/24. Per chart review, pt has low health literacy. He has been on metformin and glipizide, Jardiance was started ~10 days ago. Per pt, he stopped this due to some issues with reflux, unclear.    - sliding scale insulin, NPO scale for now.  Hold metformin and  jardiance today.   Consider some lantus depending on glu trend.  - cover with sliding scale insulin, given high values will change to NPO high intensity sliding scale today.  - hypoglycemia protocol     HTN:  Managed with Lisinopril and hydrochlorothiazide.  - Hold lisinopril and hydrochlorothiazide, resume once post-op/diet resumed.     GERD:  Recently started on Famotidine by PCP, did not tolerate PPI  - famotidine        PPE Used:  Mask          Diet: NPO per Anesthesia Guidelines for Procedure/Surgery Except for: Meds    DVT Prophylaxis: Pneumatic Compression Devices, add more at orthopedics discretion following procedure  Choudhury Catheter: Not present  Lines: None     Cardiac Monitoring: None  Code Status: Full Code      Clinically Significant Risk Factors Present on Admission         # Hyponatremia: Lowest Na = 128 mmol/L in last 2 days, will monitor as appropriate  # Hypochloremia: Lowest Cl = 93 mmol/L in last 2 days, will monitor as appropriate        # Drug Induced Platelet Defect: home medication list includes an antiplatelet medication   # Hypertension: Noted on problem list                      Disposition Plan     Medically Ready for Discharge: Anticipated in 2-4 Days             Alpesh Corona DO  Hospitalist Service  [facility]  Securely message with [employer] (more info)  Text page via [employer] Paging/Directory   ______________________________________________________________________    Interval History   Right knee pan persists but better with pain meds.  Any attempt to bend right knee substantially causes intense pain by his report.  Has chronic reflux complaints which he reports are stable.  Dental issues he think were improving but had lots of discomfort and some fevers in October.    Physical Exam   Vital Signs: Temp: 98.3  F (36.8  C) Temp src: Oral BP: 137/77 Pulse: 97   Resp: 16 SpO2: 96 % O2 Device: None (Room air)    Weight: 260 lbs 2.28 oz    GEN:  Alert, oriented x 3,  appears ill but comfortable, no overt distress.  HEENT:  Normocephalic/atraumatic, no scleral icterus, no nasal discharge, mouth moist.  CV:  Regular rate and rhythm, no murmur or JVD.  S1 + S2 noted, no S3 or S4.  LUNGS:  Clear to auscultation bilaterally without rales/rhonchi/wheezing/retractions.  Symmetric chest rise on inhalation noted.  ABD:  Active bowel sounds, soft, non-tender/non-distended.  No guarding/rigidity.  EXT:  No widespread edema.  No cyanosis.  Right knee swollen though improved from initial report, somewhat warm to touch.  SKIN:  Dry to touch, no new exanthems noted in the visualized areas.    Medical Decision Making       Over 50 MINUTES SPENT BY ME on the date of service doing chart review, history, exam, documentation & further activities per the note.      Data   Medications   Current Facility-Administered Medications   Medication Dose Route Frequency Provider Last Rate Last Admin     Current Facility-Administered Medications   Medication Dose Route Frequency Provider Last Rate Last Admin    cefTRIAXone (ROCEPHIN) 2 g vial to attach to  ml bag for ADULTS or NS 50 ml bag for PEDS  2 g Intravenous Q24H Rochelle Kaur PA-C        famotidine (PEPCID) tablet 20 mg  20 mg Oral BID Rochelle Kaur PA-C   20 mg at 11/05/24 0800    [Held by provider] glipiZIDE (GLUCOTROL XL) 24 hr tablet 10 mg  10 mg Oral BID Rochelle Kaur PA-C        hydrochlorothiazide (HYDRODIURIL) tablet 25 mg  25 mg Oral Daily Rochelle Kaur PA-C   25 mg at 11/05/24 0800    insulin aspart (NovoLOG) injection (RAPID ACTING)  1-7 Units Subcutaneous Q4H Rochelle Kaur PA-C   5 Units at 11/05/24 0012    [Held by provider] lisinopril (ZESTRIL) tablet 40 mg  40 mg Oral Daily Rochelle Kaur PA-C        [Held by provider] metFORMIN (GLUCOPHAGE XR) 24 hr tablet 1,000 mg  1,000 mg Oral BID w/meals Rochelle Kaur PA-C        sodium chloride (PF) 0.9% PF flush 3 mL  3 mL Intracatheter Q8H  Rochelle Kaur PA-C   3 mL at 11/05/24 0455    vancomycin (VANCOCIN) 1,500 mg in 0.9% NaCl 265 mL intermittent infusion  1,500 mg Intravenous Q12H Rochelle Kaur PA-C         Labs and Imaging results below reviewed today.  Recent Labs   Lab 11/05/24  0544 11/04/24  1324   WBC 12.2* 14.9*   HGB 14.1 15.5   HCT 38.8* 41.9   MCV 80 78    275     7-Day Micro Results       Collected Updated Procedure Result Status      11/04/2024 1828 11/05/2024 0831 Blood Culture Peripheral Blood [[id number]]   Peripheral Blood    Preliminary result Component Value   Culture No growth after 12 hours  [P]                11/04/2024 1736 11/04/2024 1959 Cell count with differential fluid [[id number]]    (Abnormal)   Synovial fluid from Knee, Right    Final result Component Value   No component results            11/04/2024 1736 11/04/2024 2202 Synovial fluid Aerobic Bacterial Culture Routine With Gram Stain [[id number]]   Synovial fluid from Knee, Right    Preliminary result Component Value   Gram Stain Result No organisms seen  [P]     4+ WBC seen  [P]     Predominantly PMNs               11/04/2024 1736 11/04/2024 2105 Crystal ID Synovial Fluid [[id number]]   Synovial fluid from Knee, Right    Final result Component Value   Crystals Analysis No clinically significant crystals seen.            11/04/2024 1736 11/04/2024 1959 Cell Count Body Fluid [[id number]]    (Abnormal)   Synovial fluid from Knee, Right    Final result Component Value Units   Color Yellow    Clarity Turbid    This is a corrected result. Previous result was Cloudy on 11/4/2024 at  7:00 PM CST   Cell Count Fluid Source Knee, Right    Total Nucleated Cells 97,500 /uL            11/04/2024 1736 11/04/2024 1959 Differential Body Fluid [[id number]]    Synovial fluid from Knee, Right    Final result Component Value Units   % Neutrophils 86 %   % Lymphocytes 9 %   % Monocyte/Macrophages 5 %            11/04/2024 1324 11/05/2024 0431 Blood Culture  Hand, Right [[id number]]   Blood from Hand, Right    Preliminary result Component Value   Culture No growth after 12 hours  [P]                       Latest Reference Range & Units 11/04/24 17:36   Cell Count Fluid Source  Knee, Right   Total Nucleated Cells /uL 97,500   % Neutrophils Fluid % 86   % Lymphocytes Fluid % 9   % Mono/Macro Fluid % 5   Color Fluid Colorless, Yellow  Yellow   Appearance Fluid Clear  Turbid !      Latest Reference Range & Units 11/04/24 17:36   Crystal Analysis No clinically significant crystals seen.  No clinically significant crystals seen.     Recent Labs   Lab 11/05/24  0743 11/05/24  0638 11/05/24  0544 11/04/24  2250 11/04/24  1324   NA  --   --  129*  --  128*   POTASSIUM  --   --  3.6  --  3.9   CHLORIDE  --   --  96*  --  93*   CO2  --   --  22  --  18*   ANIONGAP  --   --  11  --  17*   * 288* 265*   < > 421*   BUN  --   --  10.7  --  12.5   CR  --   --  0.71  --  0.80   GFRESTIMATED  --   --  >90  --  >90   ARELY  --   --  9.0  --  9.5   PROTTOTAL  --   --   --   --  7.9   ALBUMIN  --   --   --   --  4.4   BILITOTAL  --   --   --   --  1.5*   ALKPHOS  --   --   --   --  76   AST  --   --   --   --  13   ALT  --   --   --   --  42    < > = values in this interval not displayed.     Recent Results (from the past 24 hours)   XR Knee Right 3 Views    Narrative    XR KNEE RIGHT 3 VIEWS   11/4/2024 1:50 PM     HISTORY: knee pain  COMPARISON: None.       Impression    IMPRESSION:     There are small patellofemoral compartment marginal osteophytes,  without significant knee joint space narrowing. There is a knee joint  effusion. No displaced fracture.  RUSLAN DEL VALLE MD         SYSTEM ID:  [id number]

## 2024-11-05 NOTE — ED NOTES
[facility]  ED Nurse Handoff Report    ED Chief complaint: Knee Pain  . ED Diagnosis:   Final diagnoses:   Pyogenic arthritis of right knee joint, due to unspecified organism (H)       Allergies: No Known Allergies    Code Status: Full Code    Activity level - Baseline/Home:  independent.  Activity Level - Current:   standby.   Lift room needed: No.   Bariatric: No   Needed: No   Isolation: No.   Infection: Not Applicable.     Respiratory status: Room air    Vital Signs (within 30 minutes):   Vitals:    11/04/24 1825 11/04/24 1908 11/04/24 1909 11/04/24 1910   BP: (!) 147/72      Pulse: 100      Resp:       Temp:       TempSrc:       SpO2:  95% 95% 96%   Weight:           Cardiac Rhythm:  ,      Pain level:    Patient confused: No.   Patient Falls Risk: patient and family education.   Elimination Status: Has voided     Patient Report - Initial Complaint: Patient presents to ED accompanied by mother d/t R knee pain. Seen at  and sent to ED for further evaluation. States pain developed at 3 am. Denies traumatic injuries. R knee appears to be swollen, red and warm to touch. Difficult with ROM d/t pain   Focused Assessment:  54 year old male with a history of type 2 diabetes, who presents with right knee pain and swelling for approximately 12 hours. Patients reports he cannot bear weight on his right leg. Reports bilateral numbness in his feet that is chronic and unchanged. Reports his A1C has been good and his diabetes is well controlled. He states a recent dental infection where he had a fever, but denies fever today. Denies recent injury, trauma or other pain. Denies history of gout. Patient says he took Tylenol today around 2130.     Abnormal Results:   Labs Ordered and Resulted from Time of ED Arrival to Time of ED Departure   COMPREHENSIVE METABOLIC PANEL - Abnormal       Result Value    Sodium 128 (*)     Potassium 3.9      Carbon Dioxide (CO2) 18 (*)     Anion Gap 17 (*)      Urea Nitrogen 12.5      Creatinine 0.80      GFR Estimate >90      Calcium 9.5      Chloride 93 (*)     Glucose 421 (*)     Alkaline Phosphatase 76      AST 13      ALT 42      Protein Total 7.9      Albumin 4.4      Bilirubin Total 1.5 (*)    CBC WITH PLATELETS AND DIFFERENTIAL - Abnormal    WBC Count 14.9 (*)     RBC Count 5.40      Hemoglobin 15.5      Hematocrit 41.9      MCV 78      MCH 28.7      MCHC 37.0 (*)     RDW 11.9      Platelet Count 275      % Neutrophils 81      % Lymphocytes 11      % Monocytes 8      % Eosinophils 0      % Basophils 0      % Immature Granulocytes 1      NRBCs per 100 WBC 0      Absolute Neutrophils 12.1 (*)     Absolute Lymphocytes 1.6      Absolute Monocytes 1.2      Absolute Eosinophils 0.0      Absolute Basophils 0.0      Absolute Immature Granulocytes 0.1      Absolute NRBCs 0.0     CELL COUNT BODY FLUID - Abnormal    Color Yellow      Clarity Cloudy (*)     Cell Count Fluid Source Knee, Right      Total Nucleated Cells 97,500     URIC ACID - Normal    Uric Acid 5.6     CRYSTAL ID SYNOVIAL FLUID   DIFERENTIAL BODY FLUID   BLOOD CULTURE   AEROBIC BACTERIAL CULTURE ROUTINE   BLOOD CULTURE   CELL COUNT WITH DIFFERENTIAL FLUID        XR Knee Right 3 Views   Final Result   IMPRESSION:       There are small patellofemoral compartment marginal osteophytes,   without significant knee joint space narrowing. There is a knee joint   effusion. No displaced fracture.   RUSLAN DEL VALLE MD            SYSTEM ID:  [id number]          Treatments provided: See MAR  Family Comments:   OBS brochure/video discussed/provided to patient:  N/A  ED Medications:   Medications   triamcinolone (KENALOG-40) injection 10 mg (has no administration in time range)   lidocaine 2%-EPINEPHrine 1:100,000 2 %-1:[id number] injection (has no administration in time range)   BUPivacaine (PF) (MARCAINE) 0.5 % injection (has no administration in time range)   cefTRIAXone (ROCEPHIN) 1 g vial to attach to  mL bag for  ADULTS or NS 50 mL bag for PEDS (has no administration in time range)   oxyCODONE (ROXICODONE) tablet 5 mg (5 mg Oral $Given 11/4/24 1704)   HYDROmorphone (PF) (DILAUDID) injection 0.5 mg (0.5 mg Intravenous $Given 11/4/24 1833)       Drips infusing:  No  For the majority of the shift this patient was Green.   Interventions performed were .    Sepsis treatment initiated: No    Cares/treatment/interventions/medications to be completed following ED care:     ED Nurse Name: Meri Caba RN  7:54 PM    RECEIVING UNIT ED HANDOFF REVIEW    Above ED Nurse Handoff Report was reviewed: Yes  Reviewed by: Ellen Gomez RN on November 4, 2024 at 8:06 PM   DULCE Jaramillo called the ED to inform them the note was read: Yes

## 2024-11-05 NOTE — PROVIDER NOTIFICATION
FABIENNE notified regarding elevated blood sugar of 276 in PACU; per provider give 5 units of insulin now.  Ok for patient to return to the floor without recheck after insulin administration per Dr. Hicks.

## 2024-11-05 NOTE — PLAN OF CARE
"For vital signs and complete assessments, please see documentation flowsheets.     [id number]  Pertinent assessments: Pt A&Ox4. On RA. Not oob during shift. Afebrile. Elevated BP, other VSS. Denies pain, nausea, and SOB. PIV saline locked. Urinal at bedside. B,288    Major Shift Events: NPO started at midnight.    Treatment Plan: Symptom management. Monitor BG. Ortho consult/surgery.IV Rocephin & vanco.    Bedside Nurse: Mitch Valenzuela RN     Problem: Adult Inpatient Plan of Care  Goal: Plan of Care Review  Description: The Plan of Care Review/Shift note should be completed every shift.  The Outcome Evaluation is a brief statement about your assessment that the patient is improving, declining, or no change.  This information will be displayed automatically on your shift  note.  Outcome: Not Progressing  Flowsheets (Taken 2024 0652)  Outcome Evaluation: Plan of care continued. NPO started at midnight.  Plan of Care Reviewed With: patient  Overall Patient Progress: no change  Goal: Patient-Specific Goal (Individualized)  Description: You can add care plan individualizations to a care plan. Examples of Individualization might be:  \"Parent requests to be called daily at 9am for status\", \"I have a hard time hearing out of my right ear\", or \"Do not touch me to wake me up as it startles  me\".  Outcome: Not Progressing  Goal: Absence of Hospital-Acquired Illness or Injury  Outcome: Not Progressing  Intervention: Identify and Manage Fall Risk  Recent Flowsheet Documentation  Taken 2024 0000 by Mitch Valenzuela, RN  Safety Promotion/Fall Prevention:   activity supervised   assistive device/personal items within reach   increase visualization of patient   clutter free environment maintained   nonskid shoes/slippers when out of bed   patient and family education   room near nurse's station   room organization consistent   safety round/check completed   treat reversible contributory factors  Intervention: " Prevent Skin Injury  Recent Flowsheet Documentation  Taken 11/5/2024 0000 by Mitch Valenzuela RN  Body Position: position changed independently  Intervention: Prevent Infection  Recent Flowsheet Documentation  Taken 11/5/2024 0000 by Mitch Valenzuela RN  Infection Prevention:   cohorting utilized   hand hygiene promoted   personal protective equipment utilized   rest/sleep promoted   single patient room provided  Goal: Optimal Comfort and Wellbeing  Outcome: Not Progressing  Goal: Readiness for Transition of Care  Outcome: Not Progressing     Problem: Pain Acute  Goal: Optimal Pain Control and Function  Outcome: Not Progressing  Intervention: Prevent or Manage Pain  Recent Flowsheet Documentation  Taken 11/5/2024 0000 by Mitch Valenzuela RN  Medication Review/Management: medications reviewed     Problem: Comorbidity Management  Goal: Blood Glucose Levels Within Targeted Range  Outcome: Not Progressing  Intervention: Monitor and Manage Glycemia  Recent Flowsheet Documentation  Taken 11/5/2024 0000 by Mitch Valenzuela RN  Medication Review/Management: medications reviewed     Goal Outcome Evaluation:      Plan of Care Reviewed With: patient    Overall Patient Progress: no changeOverall Patient Progress: no change    Outcome Evaluation: Plan of care continued. NPO started at midnight.

## 2024-11-05 NOTE — ANESTHESIA CARE TRANSFER NOTE
Patient: Scout Perez    Procedure: Procedure(s):  RIGHT KNEE OPEN IRRIGATION AND DEBRIDEMENT       Diagnosis: Pyogenic arthritis of right knee joint, due to unspecified organism (H) [M00.9]  Diagnosis Additional Information: No value filed.    Anesthesia Type:   General     Note:    Oropharynx: oropharynx clear of all foreign objects and spontaneously breathing  Level of Consciousness: drowsy  Oxygen Supplementation: face mask  Level of Supplemental Oxygen (L/min / FiO2): 8  Independent Airway: airway patency satisfactory and stable  Dentition: dentition unchanged  Vital Signs Stable: post-procedure vital signs reviewed and stable  Report to RN Given: handoff report given  Patient transferred to: PACU    Handoff Report: Identifed the Patient, Identified the Reponsible Provider, Reviewed the pertinent medical history, Discussed the surgical course, Reviewed Intra-OP anesthesia mangement and issues during anesthesia, Set expectations for post-procedure period and Allowed opportunity for questions and acknowledgement of understanding  Vitals:  Vitals Value Taken Time   BP     Temp     Pulse 81 11/05/24 1231   Resp 16 11/05/24 1231   SpO2 99 % 11/05/24 1231   Vitals shown include unfiled device data.    Electronically Signed By: GUNNAR Wilson CRNA  November 5, 2024  12:31 PM

## 2024-11-06 ENCOUNTER — APPOINTMENT (OUTPATIENT)
Dept: PHYSICAL THERAPY | Facility: CLINIC | Age: 54
End: 2024-11-06
Payer: COMMERCIAL

## 2024-11-06 LAB
ANION GAP SERPL CALCULATED.3IONS-SCNC: 14 MMOL/L (ref 7–15)
B BURGDOR IGG+IGM SER QL: 0.22
BUN SERPL-MCNC: 12.8 MG/DL (ref 6–20)
CALCIUM SERPL-MCNC: 9.1 MG/DL (ref 8.8–10.4)
CHLORIDE SERPL-SCNC: 97 MMOL/L (ref 98–107)
CREAT SERPL-MCNC: 0.75 MG/DL (ref 0.67–1.17)
EGFRCR SERPLBLD CKD-EPI 2021: >90 ML/MIN/1.73M2
ERYTHROCYTE [DISTWIDTH] IN BLOOD BY AUTOMATED COUNT: 11.7 % (ref 10–15)
GLUCOSE BLDC GLUCOMTR-MCNC: 247 MG/DL (ref 70–99)
GLUCOSE BLDC GLUCOMTR-MCNC: 272 MG/DL (ref 70–99)
GLUCOSE BLDC GLUCOMTR-MCNC: 280 MG/DL (ref 70–99)
GLUCOSE BLDC GLUCOMTR-MCNC: 280 MG/DL (ref 70–99)
GLUCOSE BLDC GLUCOMTR-MCNC: 303 MG/DL (ref 70–99)
GLUCOSE BLDC GLUCOMTR-MCNC: 319 MG/DL (ref 70–99)
GLUCOSE SERPL-MCNC: 266 MG/DL (ref 70–99)
HCO3 SERPL-SCNC: 22 MMOL/L (ref 22–29)
HCT VFR BLD AUTO: 39.5 % (ref 40–53)
HGB BLD-MCNC: 14.1 G/DL (ref 13.3–17.7)
MCH RBC QN AUTO: 28.4 PG (ref 26.5–33)
MCHC RBC AUTO-ENTMCNC: 35.7 G/DL (ref 31.5–36.5)
MCV RBC AUTO: 80 FL (ref 78–100)
PLATELET # BLD AUTO: 241 10E3/UL (ref 150–450)
POTASSIUM SERPL-SCNC: 3.7 MMOL/L (ref 3.4–5.3)
RBC # BLD AUTO: 4.96 10E6/UL (ref 4.4–5.9)
SODIUM SERPL-SCNC: 133 MMOL/L (ref 135–145)
VANCOMYCIN SERPL-MCNC: 8 UG/ML
WBC # BLD AUTO: 12.6 10E3/UL (ref 4–11)

## 2024-11-06 PROCEDURE — 120N000001 HC R&B MED SURG/OB

## 2024-11-06 PROCEDURE — 250N000013 HC RX MED GY IP 250 OP 250 PS 637

## 2024-11-06 PROCEDURE — 250N000011 HC RX IP 250 OP 636: Performed by: STUDENT IN AN ORGANIZED HEALTH CARE EDUCATION/TRAINING PROGRAM

## 2024-11-06 PROCEDURE — 258N000003 HC RX IP 258 OP 636: Performed by: STUDENT IN AN ORGANIZED HEALTH CARE EDUCATION/TRAINING PROGRAM

## 2024-11-06 PROCEDURE — 258N000003 HC RX IP 258 OP 636

## 2024-11-06 PROCEDURE — 250N000011 HC RX IP 250 OP 636

## 2024-11-06 PROCEDURE — 80048 BASIC METABOLIC PNL TOTAL CA: CPT

## 2024-11-06 PROCEDURE — 82947 ASSAY GLUCOSE BLOOD QUANT: CPT

## 2024-11-06 PROCEDURE — 36415 COLL VENOUS BLD VENIPUNCTURE: CPT

## 2024-11-06 PROCEDURE — 250N000012 HC RX MED GY IP 250 OP 636 PS 637: Performed by: INTERNAL MEDICINE

## 2024-11-06 PROCEDURE — 80202 ASSAY OF VANCOMYCIN: CPT | Performed by: INTERNAL MEDICINE

## 2024-11-06 PROCEDURE — 250N000013 HC RX MED GY IP 250 OP 250 PS 637: Performed by: INTERNAL MEDICINE

## 2024-11-06 PROCEDURE — 99233 SBSQ HOSP IP/OBS HIGH 50: CPT | Performed by: INTERNAL MEDICINE

## 2024-11-06 PROCEDURE — 85027 COMPLETE CBC AUTOMATED: CPT

## 2024-11-06 PROCEDURE — 36415 COLL VENOUS BLD VENIPUNCTURE: CPT | Performed by: INTERNAL MEDICINE

## 2024-11-06 PROCEDURE — 97161 PT EVAL LOW COMPLEX 20 MIN: CPT | Mod: GP

## 2024-11-06 PROCEDURE — 99232 SBSQ HOSP IP/OBS MODERATE 35: CPT | Performed by: INTERNAL MEDICINE

## 2024-11-06 PROCEDURE — 97530 THERAPEUTIC ACTIVITIES: CPT | Mod: GP

## 2024-11-06 RX ORDER — MULTIPLE VITAMINS W/ MINERALS TAB 9MG-400MCG
1 TAB ORAL DAILY
Status: DISCONTINUED | OUTPATIENT
Start: 2024-11-06 | End: 2024-11-10 | Stop reason: HOSPADM

## 2024-11-06 RX ORDER — DEXTROSE MONOHYDRATE 25 G/50ML
25-50 INJECTION, SOLUTION INTRAVENOUS
Status: DISCONTINUED | OUTPATIENT
Start: 2024-11-06 | End: 2024-11-10 | Stop reason: HOSPADM

## 2024-11-06 RX ORDER — NICOTINE POLACRILEX 4 MG
15-30 LOZENGE BUCCAL
Status: DISCONTINUED | OUTPATIENT
Start: 2024-11-06 | End: 2024-11-10 | Stop reason: HOSPADM

## 2024-11-06 RX ADMIN — VANCOMYCIN HYDROCHLORIDE 2000 MG: 1 INJECTION, POWDER, LYOPHILIZED, FOR SOLUTION INTRAVENOUS at 20:52

## 2024-11-06 RX ADMIN — Medication 1 TABLET: at 14:04

## 2024-11-06 RX ADMIN — HYDROMORPHONE HYDROCHLORIDE 0.4 MG: 0.2 INJECTION, SOLUTION INTRAMUSCULAR; INTRAVENOUS; SUBCUTANEOUS at 22:24

## 2024-11-06 RX ADMIN — CEFTRIAXONE 2 G: 2 INJECTION, POWDER, FOR SOLUTION INTRAMUSCULAR; INTRAVENOUS at 20:15

## 2024-11-06 RX ADMIN — LISINOPRIL 40 MG: 40 TABLET ORAL at 08:20

## 2024-11-06 RX ADMIN — OXYCODONE HYDROCHLORIDE 5 MG: 5 TABLET ORAL at 05:30

## 2024-11-06 RX ADMIN — HYDROCHLOROTHIAZIDE 25 MG: 25 TABLET ORAL at 08:16

## 2024-11-06 RX ADMIN — HYDROMORPHONE HYDROCHLORIDE 0.2 MG: 0.2 INJECTION, SOLUTION INTRAMUSCULAR; INTRAVENOUS; SUBCUTANEOUS at 08:15

## 2024-11-06 RX ADMIN — FAMOTIDINE 20 MG: 20 TABLET, FILM COATED ORAL at 20:14

## 2024-11-06 RX ADMIN — ACETAMINOPHEN 650 MG: 325 TABLET, FILM COATED ORAL at 21:10

## 2024-11-06 RX ADMIN — OXYCODONE HYDROCHLORIDE 5 MG: 5 TABLET ORAL at 21:10

## 2024-11-06 RX ADMIN — INSULIN GLARGINE 8 UNITS: 100 INJECTION, SOLUTION SUBCUTANEOUS at 09:59

## 2024-11-06 RX ADMIN — ASPIRIN 81 MG: 81 TABLET, COATED ORAL at 08:16

## 2024-11-06 RX ADMIN — ASPIRIN 81 MG: 81 TABLET, COATED ORAL at 20:14

## 2024-11-06 RX ADMIN — FAMOTIDINE 20 MG: 20 TABLET, FILM COATED ORAL at 08:16

## 2024-11-06 RX ADMIN — VANCOMYCIN HYDROCHLORIDE 1500 MG: 10 INJECTION, POWDER, LYOPHILIZED, FOR SOLUTION INTRAVENOUS at 10:02

## 2024-11-06 NOTE — PLAN OF CARE
"Goal Outcome Evaluation:    Pertinent assessments: Pt A&O x4. VSS, on RA. Up Ax1-2 with gait belt and walker. Denies N/V, SOB. C/O right knee pain, prn IV dilaudid given, ice pack applied. Using urinal at bedside. Hemavac to right knee with minimal bloody output. Right knee dressing CDI. Tolerating a regular diet. PIV X2 SL.     Major Shift Events: up with PT      Treatment Plan: Symptom management. Monitor BG. IV Rocephin & vanco. Ortho, PT/OT, ID following    Bedside Nurse: Mindy Babb RN       Problem: Adult Inpatient Plan of Care  Goal: Plan of Care Review  Description: The Plan of Care Review/Shift note should be completed every shift.  The Outcome Evaluation is a brief statement about your assessment that the patient is improving, declining, or no change.  This information will be displayed automatically on your shift  note.  11/6/2024 1438 by Mindy Babb RN  Outcome: Progressing  Flowsheets (Taken 11/6/2024 1438)  Outcome Evaluation: Pt c/o pain, IV dilaudid given. No nausea or SOB.  Plan of Care Reviewed With: patient  Overall Patient Progress: no change  11/6/2024 1438 by Mindy Babb RN  Outcome: Not Progressing  Flowsheets (Taken 11/6/2024 1438)  Outcome Evaluation: Pt c/o pain, IV dilaudid given. No nausea or SOB.  Plan of Care Reviewed With: patient  Overall Patient Progress: no change  Goal: Patient-Specific Goal (Individualized)  Description: You can add care plan individualizations to a care plan. Examples of Individualization might be:  \"Parent requests to be called daily at 9am for status\", \"I have a hard time hearing out of my right ear\", or \"Do not touch me to wake me up as it startles  me\".  11/6/2024 1438 by Mindy Babb RN  Outcome: Progressing  11/6/2024 1438 by Mindy Babb RN  Outcome: Not Progressing  Goal: Absence of Hospital-Acquired Illness or Injury  11/6/2024 1438 by Mindy Babb RN  Outcome: Progressing  11/6/2024 1438 by Mindy Babb, " RN  Outcome: Not Progressing  Intervention: Identify and Manage Fall Risk  Recent Flowsheet Documentation  Taken 11/6/2024 0815 by Mindy Babb RN  Safety Promotion/Fall Prevention:   activity supervised   nonskid shoes/slippers when out of bed   room near nurse's station   safety round/check completed  Intervention: Prevent Skin Injury  Recent Flowsheet Documentation  Taken 11/6/2024 0815 by Mindy Babb RN  Body Position: position changed independently  Intervention: Prevent and Manage VTE (Venous Thromboembolism) Risk  Recent Flowsheet Documentation  Taken 11/6/2024 0815 by Mindy Babb RN  VTE Prevention/Management: SCDs on (sequential compression devices)  Intervention: Prevent Infection  Recent Flowsheet Documentation  Taken 11/6/2024 0815 by Mindy Babb RN  Infection Prevention: single patient room provided  Goal: Optimal Comfort and Wellbeing  11/6/2024 1438 by Mindy Babb RN  Outcome: Progressing  11/6/2024 1438 by Mindy Babb RN  Outcome: Not Progressing  Intervention: Monitor Pain and Promote Comfort  Recent Flowsheet Documentation  Taken 11/6/2024 0815 by Mindy Babb RN  Pain Management Interventions: medication (see MAR)  Goal: Readiness for Transition of Care  11/6/2024 1438 by Mindy Babb RN  Outcome: Progressing  11/6/2024 1438 by Mindy Babb RN  Outcome: Not Progressing     Problem: Pain Acute  Goal: Optimal Pain Control and Function  11/6/2024 1438 by Mindy Babb RN  Outcome: Progressing  11/6/2024 1438 by Mindy Babb RN  Outcome: Not Progressing  Intervention: Develop Pain Management Plan  Recent Flowsheet Documentation  Taken 11/6/2024 0815 by Mindy Babb RN  Pain Management Interventions: medication (see MAR)  Intervention: Prevent or Manage Pain  Recent Flowsheet Documentation  Taken 11/6/2024 0815 by Mindy Babb RN  Medication Review/Management: medications reviewed     Problem: Comorbidity  Management  Goal: Blood Glucose Levels Within Targeted Range  11/6/2024 1438 by Mindy Babb RN  Outcome: Progressing  11/6/2024 1438 by Mindy Babb RN  Outcome: Not Progressing  Intervention: Monitor and Manage Glycemia  Recent Flowsheet Documentation  Taken 11/6/2024 0815 by Mindy Babb RN  Medication Review/Management: medications reviewed     Problem: Skin Injury Risk Increased  Goal: Skin Health and Integrity  11/6/2024 1438 by Mindy Babb RN  Outcome: Progressing  11/6/2024 1438 by Mindy Babb RN  Outcome: Not Progressing  Intervention: Plan: Nurse Driven Intervention: Moisture Management  Recent Flowsheet Documentation  Taken 11/6/2024 0800 by Mindy Babb RN  Moisture Interventions: No brief in bed  Bathing/Skin Care: other (see comments)  Intervention: Optimize Skin Protection  Recent Flowsheet Documentation  Taken 11/6/2024 0815 by Mindy Babb RN  Activity Management: activity adjusted per tolerance  Head of Bed (HOB) Positioning: HOB at 20-30 degrees         Plan of Care Reviewed With: patient    Overall Patient Progress: improvingOverall Patient Progress: improving    Outcome Evaluation: Pt c/o pain. oxy given once. denies nausea

## 2024-11-06 NOTE — PROGRESS NOTES
[facility]    Medicine Progress Note - Hospitalist Service    Date of Admission:  11/4/2024    Assessment & Plan   Scout Perez is a 54 year old male with PMHx significant for HTN, DM type II, polyneuropathy and GERD, who was admitted on 11/4/2024 with concerns for R knee septic arthritis.     R knee septic arthritis  Recent dental procedure/possible infection, reported fevers in October:  Postoperative state status post right knee arthrotomy and irrigation debridement last November 5, 2024  Sudden onset of R knee pain, swelling and erythema. No joint hardware, notes recent dental infection. Hx of DM, HgbA1c 8.7 ten days ago.  R knee aspirated in ED, 97,500 nucleated cells, culture and gram stain pending.  Delay in gram stain results so in order to not delay treatment, he was given IV Rocephin and Vanco in ED.    - Concern over possible seeding.   Patient with vague dental issues since September.  He had teeth extraction.  Reports not on antibiotics.  Had some intermittent flushed/fever episodes in October with temp 101 by his report.  Dental issues had somewhat improved but then right knee issue developed as above.      - continue IV Rocephin and Vanco for now.  No crystals seen on exam of fluid.    -Highly appreciate extensive and prompt input from operative orthopedics service.  -ID service following with us.  Several differentials being considered.  Lyme disease workup sent earlier.  -Will follow infectious markers, previous cultures were sent earlier.  -Will defer further optimization of antibiotics intervention with ID service  -Optimize pain control.  Aspirin for DVT prophylaxis twice daily dosing as per Ortho recommendations     DM type II, poor control increasing infection risk  Polyneuropathy:  Glucose 421 in ED, last HgbA1c 8.7 on 10/23/24. Per chart review, pt has low health literacy. He has been on metformin and glipizide, Jardiance was started ~10 days ago. Per pt, he stopped  this due to some issues with reflux, unclear.  -Now with uncontrolled glucose levels.  He thinks his oral intake might improve later on.  He mentioned that he has not eaten much at least in the past several weeks.  -With his uncontrolled glucose levels and suspected underlying infectious process we will be more aggressive with diabetic control.  Initiate basal Lantus for now and continued on insulin/scale  -We will hold patient's metformin for now until he demonstrates better oral intake and creatinine remained permitting as patient is also receiving systemic vancomycin.       HTN:  Managed with Lisinopril and hydrochlorothiazide.  -Resume home regimen     GERD:  Recently started on Famotidine by PCP, did not tolerate PPI  - famotidine              Diet: Regular Diet Adult    DVT Prophylaxis: Pneumatic Compression Devices and orthopedics recommending aspirin for DVT prophylaxis  Choudhury Catheter: Not present  Lines: None     Cardiac Monitoring: None  Code Status: Full Code      Clinically Significant Risk Factors         # Hyponatremia: Lowest Na = 128 mmol/L in last 2 days, will monitor as appropriate  # Hypochloremia: Lowest Cl = 93 mmol/L in last 2 days, will monitor as appropriate          # Hypertension: Noted on problem list                       Disposition Plan     Medically Ready for Discharge: Anticipated in 2-4 Days             Rigoberto Barnett MD, MD  Hospitalist Service  [facility]  Securely message with [employer] (more info)  Text page via Smart[employer] Paging/Directory   ______________________________________________________________________    Interval History   I assumed medicine service care today.  Seen and examined.  Chart reviewed.  Case discussed with nursing service was gracious to be present at bedside during my encounter.  I met this gentleman this morning while he is laying comfortably in bed.  He is still having some intermittent anticipated right lower extremity pain with  some spastic spells.  He tolerated surgical intervention yesterday.  Currently afebrile.  No reported nausea or vomiting.  Passing flatus.  Voiding freely.  No reports of any severe uncontrolled pain overnight.  Denies any chest pain or shortness of breath.  Currently not requiring oxygen support.  Afebrile this morning.    Physical Exam   Vital Signs: Temp: 98.1  F (36.7  C) Temp src: Oral BP: (!) 150/74 Pulse: 97   Resp: 18 SpO2: 96 % O2 Device: None (Room air) Oxygen Delivery: 2 LPM  Weight: 260 lbs 2.28 oz    HEENT; Atraumatic, normocephalic, pinkish conjuctiva, pupils bilateral reactive   Skin: warm and moist, no rashes  Lymphatics: no cervical or axillary lymphandenopathy  Lungs: equal chest expansion, clear to auscultation, no wheezes, no stridor, no crackles,   Heart: normal rate, normal rhythm, no rubs or gallops.   Abdomen: normal bowel sounds, no tenderness, no peritoneal signs, no guarding  Extremities: no deformities, no edema   Several skin excoriations on left lower extremities, right Ace wrap bandage lower extremity in dressing seen.  No bleeding.  Neuro; follow commands, alert and oriented x3, spontaneous speech, coherent, moves all extremities spontaneously  Psych; no hallucination, euthymic mood, not agitated      Medical Decision Making       50 MINUTES SPENT BY ME on the date of service doing chart review, history, exam, documentation & further activities per the note.  MANAGEMENT DISCUSSED with the following over the past 24 hours: Yes   NOTE(S)/MEDICAL RECORDS REVIEWED over the past 24 hours: Yes.       Data     I have personally reviewed the following data over the past 24 hrs:    12.6 (H)  \   14.1   / 241     133 (L) 97 (L) 12.8 /  266 (H)   3.7 22 0.75 \     Procal: N/A CRP: N/A Lactic Acid: N/A           Imaging results reviewed over the past 24 hrs:   No results found for this or any previous visit (from the past 24 hours).

## 2024-11-06 NOTE — PROGRESS NOTES
Orthopedic Surgery  Scout Perez  11/06/2024     Admit Date:  11/4/2024    POD: 1 Day Post-Op   Procedure(s):  Right knee arthrotomy and irrigation debridement right knee for infection, deepest layer synovium     Patient resting comfortably in bed. Endorses post operative pain and knee fullness, dressing partially removed and underlying skin is clean dry and intact. Hemovac remains intact with serosanguinous drainage.   Tolerating oral intake.    Denies nausea or vomiting  Denies chest pain or shortness of breath    Temp:  [97.3  F (36.3  C)-98.7  F (37.1  C)] 98.1  F (36.7  C)  Pulse:  [83-97] 97  Resp:  [17-22] 18  BP: (124-163)/(74-94) 150/74  SpO2:  [92 %-100 %] 96 %    Alert and oriented  Dressing is clean, dry, and intact.   Minimal erythema of the surrounding skin.   Bilateral calves are soft, non-tender.  Right lower extremity is NVI.  Sensation intact bilateral lower extremities  Patient able to resist dorsi and plantar flexion bilaterally  3+Dp pulse    Labs:  Recent Labs   Lab Test 11/06/24  0611 11/05/24  0544 11/04/24  1324   WBC 12.6* 12.2* 14.9*   HGB 14.1 14.1 15.5    225 275     No lab results found.  Recent Labs   Lab Test 11/05/24  0544   CRPI 195.48*         1. PLAN:   ASA 81 mg BID for DVT prophylaxis.     Perioperative Ancef. Awaiting cultures, and IV antibiotics per Id recs. Appreciate assistance, currently on Rocephin and Vanco    Mobilize with PT/OT    WBAT RLE   Continue current pain regimen   Dressings: Keep intact.  Change if >60% saturated or peeling off.    Leave Hemovac drain intact until less than 30 ml per shift    Follow-up: 2 weeks post-op with Dr. Guerra team    2. Disposition   Anticipate d/c to TCU when medically cleared and progressing in PT.    Ella Baker PA-C

## 2024-11-06 NOTE — PLAN OF CARE
"End of Shift Summary  For vital signs and complete assessments, please see documentation flowsheets.     Pertinent assessments: POD #0. A&O x4. Bp slightly elevated bp (142/80) other VSS on RA. Capno discontinued. Denies N/V, SOB. Reporting some right knee pain, prn oxy given x2. Refusing activity this shift, not OOB. Using urinal at bedside. Hemavac to right knee with minimal bloody output. Right knee dressing CDI. Tolerating a regular diet, poor appetite. LR 10 mL/hr. Rocephin and vanco given. , 303, 247.    Major Shift Events: Uneventful     Treatment Plan: Symptom management. Monitor BG. IV Rocephin & vanco.    Bedside Nurse: Melissa Jack RN             Goal Outcome Evaluation:      Plan of Care Reviewed With: patient    Overall Patient Progress: no changeOverall Patient Progress: no change    Outcome Evaluation: PRN oxy x2 for pain. BG monitoring, refusing activity              Problem: Adult Inpatient Plan of Care  Goal: Plan of Care Review  Description: The Plan of Care Review/Shift note should be completed every shift.  The Outcome Evaluation is a brief statement about your assessment that the patient is improving, declining, or no change.  This information will be displayed automatically on your shift  note.  Outcome: Progressing  Flowsheets (Taken 11/6/2024 3872)  Outcome Evaluation: PRN oxy x2 for pain. BG monitoring, refusing activity  Plan of Care Reviewed With: patient  Overall Patient Progress: no change  Goal: Patient-Specific Goal (Individualized)  Description: You can add care plan individualizations to a care plan. Examples of Individualization might be:  \"Parent requests to be called daily at 9am for status\", \"I have a hard time hearing out of my right ear\", or \"Do not touch me to wake me up as it startles  me\".  Outcome: Progressing  Goal: Absence of Hospital-Acquired Illness or Injury  Outcome: Progressing  Intervention: Identify and Manage Fall Risk  Recent Flowsheet " Documentation  Taken 11/5/2024 2103 by Melissa Jack RN  Safety Promotion/Fall Prevention:   activity supervised   assistive device/personal items within reach   clutter free environment maintained   nonskid shoes/slippers when out of bed   patient and family education   room near nurse's station   room organization consistent   safety round/check completed   supervised activity  Intervention: Prevent Skin Injury  Recent Flowsheet Documentation  Taken 11/5/2024 2103 by Melissa Jack RN  Body Position: position changed independently  Intervention: Prevent and Manage VTE (Venous Thromboembolism) Risk  Recent Flowsheet Documentation  Taken 11/5/2024 2103 by Melissa Jack RN  VTE Prevention/Management: SCDs on (sequential compression devices)  Intervention: Prevent Infection  Recent Flowsheet Documentation  Taken 11/5/2024 2103 by Melissa Jack RN  Infection Prevention:   cohorting utilized   hand hygiene promoted   rest/sleep promoted   single patient room provided  Goal: Optimal Comfort and Wellbeing  Outcome: Progressing  Intervention: Monitor Pain and Promote Comfort  Recent Flowsheet Documentation  Taken 11/5/2024 2103 by Melissa Jack RN  Pain Management Interventions: declines  Goal: Readiness for Transition of Care  Outcome: Progressing     Problem: Pain Acute  Goal: Optimal Pain Control and Function  Outcome: Progressing  Intervention: Develop Pain Management Plan  Recent Flowsheet Documentation  Taken 11/5/2024 2103 by Melissa Jack RN  Pain Management Interventions: declines  Intervention: Prevent or Manage Pain  Recent Flowsheet Documentation  Taken 11/5/2024 2103 by Melissa Jack RN  Sleep/Rest Enhancement:   awakenings minimized   consistent schedule promoted   noise level reduced   regular sleep/rest pattern promoted   room darkened  Medication Review/Management: medications reviewed     Problem: Comorbidity Management  Goal: Blood Glucose Levels Within Targeted  Range  Outcome: Progressing  Intervention: Monitor and Manage Glycemia  Recent Flowsheet Documentation  Taken 11/5/2024 2103 by Melissa Jack, RN  Medication Review/Management: medications reviewed     Problem: Skin Injury Risk Increased  Goal: Skin Health and Integrity  Outcome: Progressing  Intervention: Optimize Skin Protection  Recent Flowsheet Documentation  Taken 11/5/2024 2103 by Melissa Jack, RN  Activity Management: activity adjusted per tolerance  Head of Bed (HOB) Positioning: HOB at 30 degrees

## 2024-11-06 NOTE — PROGRESS NOTES
[facility]  Infectious Disease Progress Note          Assessment and Plan:   Date of Admission:  11/4/2024  Date of Consult (When I saw the patient): 11/05/24        Assessment & Plan  Scout Perez is a 54 year old male who was admitted on 11/4/2024.      Impression: 1 54-year-old male, acute new right knee pain, tap consistent with infection, status post operative I&D, cultures all pending  2 vague low-grade fever and systemic symptoms for several weeks before knee pain, question bacteremic or other underlying disease, prominent the differential diagnosis here is Lyme disease  3 diabetes mellitus  4 right maxillary tooth extraction preceding symptoms without obvious infection or antibiotics     REC 1 continue Vanco and ceftriaxone, knee pain about the same no major systemic symptoms  2 so far blood culture and knee culture are negative, Lyme serology also negative and for this stage of Lyme disease should be positive if that was the diagnosis.  Give cultures another day but if negative will be challenging decision tree here on approach to this              Interval History:     no new complaints no systemic symptoms, knee pain still about the same, cultures all negative so far crystals negative,               Medications:     Current Facility-Administered Medications   Medication Dose Route Frequency Provider Last Rate Last Admin    aspirin EC tablet 81 mg  81 mg Oral BID Consuelo Sanchez PA-C   81 mg at 11/06/24 0816    cefTRIAXone (ROCEPHIN) 2 g vial to attach to  ml bag for ADULTS or NS 50 ml bag for PEDS  2 g Intravenous Q24H Consuelo Sanchez PA-C   2 g at 11/05/24 2101    famotidine (PEPCID) tablet 20 mg  20 mg Oral BID Consuelo Sanchez PA-C   20 mg at 11/06/24 0816    [Held by provider] glipiZIDE (GLUCOTROL XL) 24 hr tablet 10 mg  10 mg Oral BID Rochelle Kaur PA-C        hydrochlorothiazide (HYDRODIURIL) tablet 25 mg  25 mg Oral Daily Rigoberto Barnett MD   25 mg  at 11/06/24 0816    insulin aspart (NovoLOG) injection (RAPID ACTING)  1-7 Units Subcutaneous TID  Rigoberto Barnett MD   3 Units at 11/06/24 1231    insulin aspart (NovoLOG) injection (RAPID ACTING)  1-5 Units Subcutaneous At Bedtime Rigoberto Barnett MD        insulin glargine (LANTUS PEN) injection 8 Units  8 Units Subcutaneous QAM  Rigoberto Barnett MD   8 Units at 11/06/24 0959    lisinopril (ZESTRIL) tablet 40 mg  40 mg Oral Daily Rigoberto Barnett MD   40 mg at 11/06/24 0820    [Held by provider] metFORMIN (GLUCOPHAGE XR) 24 hr tablet 1,000 mg  1,000 mg Oral BID w/meals Rochelle Kaur PA-C        multivitamin w/minerals (THERA-VIT-M) tablet 1 tablet  1 tablet Oral Daily Rigoberto Barnett MD        sodium chloride (PF) 0.9% PF flush 3 mL  3 mL Intracatheter Q8H Consuelo Sanchez PA-C        sodium chloride (PF) 0.9% PF flush 3 mL  3 mL Intracatheter Q8H Consuelo Sanchez PA-C   3 mL at 11/06/24 1231    vancomycin (VANCOCIN) 1,500 mg in 0.9% NaCl 265 mL intermittent infusion  1,500 mg Intravenous Q12H Consuelo Sanchez PA-C   1,500 mg at 11/06/24 1002                  Physical Exam:   Blood pressure (!) 150/74, pulse 97, temperature 98.1  F (36.7  C), temperature source Oral, resp. rate 18, weight 118 kg (260 lb 2.3 oz), SpO2 96%.  Wt Readings from Last 2 Encounters:   11/04/24 118 kg (260 lb 2.3 oz)   08/04/23 121.8 kg (268 lb 9.6 oz)     Vital Signs with Ranges  Temp:  [97.3  F (36.3  C)-98.7  F (37.1  C)] 98.1  F (36.7  C)  Pulse:  [84-97] 97  Resp:  [18-20] 18  BP: (142-163)/(74-93) 150/74  SpO2:  [92 %-96 %] 96 %    Constitutional: Awake, alert, cooperative, no apparent distress     Lungs: Clear to auscultation bilaterally, no crackles or wheezing   Cardiovascular: Regular rate and rhythm, normal S1 and S2, and no murmur noted   Abdomen: Normal bowel sounds, soft, non-distended, non-tender   Skin: No rashes, no cyanosis, no edema   Other: Knee wrapped          Data:   All microbiology  "laboratory data reviewed.  Recent Labs   Lab Test 11/06/24  0611 11/05/24  0544 11/04/24  1324   WBC 12.6* 12.2* 14.9*   HGB 14.1 14.1 15.5   HCT 39.5* 38.8* 41.9   MCV 80 80 78    225 275     Recent Labs   Lab Test 11/06/24  0611 11/05/24  0544 11/04/24  1324   CR 0.75 0.71 0.80     Recent Labs   Lab Test 08/04/23  1045   SED 4     No lab results found.    Invalid input(s): \"UC\"     "

## 2024-11-06 NOTE — CONSULTS
CLINICAL NUTRITION SERVICES  -  ASSESSMENT NOTE      Recommendations Ordered by Registered Dietitian (RD):   Continue current diet order per MD    Nutrition supplements - Glucerna TID    Recommend 4-6 smaller, more frequent intakes per day    Regarding fear of certain foods hurting his stomach - recommend ordering at least one new food with each meal as well as one food that he is confident that he will be able tolerate.    Ordering MVI/MI d/t concern for poor PO intake    Appreciate encouragement with frequent and consistent intakes     MALNUTRITION:  % Weight Loss:  Weight loss does not meet criteria for malnutrition   % Intake:  </= 75% for >/= 1 month  Subcutaneous Fat Loss:  None observed  Muscle Loss:  Temporal region mild depletion -> not using as criterion d/t only present in one region  Fluid Retention:  Trace    Malnutrition Diagnosis: Patient does not meet two of the above criteria necessary for diagnosing malnutrition          REASON FOR ASSESSMENT  Scout Perez is a 54 year old male seen by Registered Dietitian for Admission Nutrition Risk Screen for positive MST - unsure weight loss    PMH: HTN, DM type II, polyneuropathy and GERD     Per EMR, pt presented to ED on 11/4/24 with concerns for R knee septic arthritis    Per chart review from this admission:   Pt underwent a right knee arthrotomy and irrigation debridement yesterday, 11/5  Pt w/ uncontrolled glucose levels (was 421 in ED). He mentioned that he has not eaten much at least in the past several weeks, but believes his oral intake might improve later on.  Metformin is being held for now until he demonstrates better oral intake. Pt currently reporting some intermittent anticipated right lower extremity pain with some spastic spells.  Pt also mentioned intermittent abd pain w/ nausea      NUTRITION HISTORY    - Information obtained from chart review and pt at bedside  - Diet at home: Regular diet  - Usual intakes: Since mid September when he  had 2 teeth extracted, he noted that his intake has been significantly down. Reports the following diet:   B: Eggs and toast   L: Chicken Noodle Soup and Rice (50%)   D: Chicken Noodle Soup and Rice (50%)  Reports that chicken noodle soup and rice are really the only foods that sound good to him right now and that everything else is slightly nauseating.  - Barriers to PO intakes: Food aversion to things other than chicken noodle soup and rice, fear of stomach pain if expands diet  - Use of oral supplements: None. A few years ago, a doctor told him that protein shakes were too high in sugar and that he should not have them.  - Chewing/swallowing issues: Struggles w/ chewing ever since getting his teeth pulled.  - Meal preparation/grocery shopping: Pt states that he does this for himself.  - Allergies: NKFA    I met with pt at bedside. Pt was very pleasant and engaged throughout conversation.  He stated that he knows that he has not been eating enough over the last few months, but that he has been really struggling with intakes that are not chicken noodle soup and rice. He states that these foods are easier for him to chew and he knows that they will not irritate his GI. Pt noted that this is very different than his diet 4-6 months ago where he would eat all sorts of vegetables and meat.  We discussed the need for adequate calories and protein to be able to heal to the best of his ability. We discussed different protein sources as well as how to break up meals throughout the day to try to get closer to adequate intakes.  I performed NFPE.  We discussed the addition of supplements into his diet. He noted that he is aware that his current diet is very low protein and acknowledged the need to increase his overall protein intake during the day. Pt was agreeable to Glucerna TID - he did not state any preference of flavors.   Pt reported that he is really trying to increase intakes and that he will continue to try to do his  "best.    Pt verbalized understanding of all information presented and denied further questions at this time.    RD will follow as appropriate.     CURRENT NUTRITION ORDERS  Diet Order:     Regular  11/4: Combination Diet  11/5: NPO, ADAT, FLD, then Regular      Current Intake/Tolerance:  Per flowsheet documentation, % intakes and good appetite.  Per [facility], pt has ordered 2 meals since admission. 1 last night for dinner and 1 this morning for breakfast.       NUTRITION FOCUSED PHYSICAL ASSESSMENT FOR DIAGNOSING MALNUTRITION)  Yes           Observed:    See above      ANTHROPOMETRICS  Height: 6'2\"  Weight: 260 lbs 2.28 oz  Body mass index is 33.4 kg/m .  Weight Status:  Obesity Grade I BMI 30-34.9  Weight History: Weight has been trending down since July, apart from a spike in weight at the end of October of this year.  -4.6% BW x 3 months (estimated)   Wt Readings from Last 10 Encounters:   11/04/24 118 kg (260 lb 2.3 oz)   08/04/23 121.8 kg (268 lb 9.6 oz)   07/27/23 121.7 kg (268 lb 6.4 oz)   06/26/23 118.8 kg (262 lb)   06/22/23 118.8 kg (262 lb)   06/20/23 120.2 kg (265 lb)   06/15/23 120.2 kg (265 lb)   06/05/23 115.7 kg (255 lb)   05/26/23 115.9 kg (255 lb 8 oz)   05/25/23 116.1 kg (255 lb 14.4 oz)      Care Everywhere:   10/23/24 : 134.9 kg (297 lb 7 oz)   07/16/24 : 125.6 kg (276 lb 12.8 oz)   04/02/24 : 123.4 kg (272 lb)     LABS  Labs reviewed  Noted: Na 133(L), (H)    MEDICATIONS  Medications reviewed  Noted: novolog, lantus, vancomycin -- Metformin currently being held.     SKIN  1+ edema to R ankle, foot, and knee (1-2+)    STOOLING  No BMs noted since admission      ASSESSED NUTRITION NEEDS PER APPROVED PRACTICE GUIDELINES:    Dosing Weight 118 kg (Actual BW)  Estimated Energy Needs: 15-20 Kcal/Kg  Justification: maintenance  Estimated Protein Needs: 0.8-1 g pro/Kg  Justification: maintenance and preservation of lean body mass  Estimated Fluid Needs: 1 mL/kcal OR per provider pending " fluid status      NUTRITION DIAGNOSIS:  Inadequate oral intake related to inability to consume sufficient intakes and poor appetite as evidenced by recent oral surgery, pt reported estimated intake of <75% of needs for >/= 1 month, mild muscle wasting, and pt report of low appetite and taste aversions.       NUTRITION INTERVENTIONS  Recommendations / Nutrition Prescription  See Above      Implementation  Nutrition education: See above  Medical Food Supplement and Multivitamin/Mineral      Nutrition Goals  >75% PO nutritionally adequate meals/supplements TID (on average)        MONITORING AND EVALUATION:  Progress towards goals will be monitored and evaluated per protocol and Practice Guidelines          Alis Smith RD, LD  Clinical Dietitian  3rd Floor/ICU: [phone]  All Other Floors: [phone]  Weekends/Holiday: [phone]  Office: [phone]

## 2024-11-07 ENCOUNTER — APPOINTMENT (OUTPATIENT)
Dept: OCCUPATIONAL THERAPY | Facility: CLINIC | Age: 54
End: 2024-11-07
Payer: COMMERCIAL

## 2024-11-07 ENCOUNTER — ENROLLMENT (OUTPATIENT)
Dept: HOME HEALTH SERVICES | Facility: HOME HEALTH | Age: 54
End: 2024-11-07
Payer: COMMERCIAL

## 2024-11-07 ENCOUNTER — APPOINTMENT (OUTPATIENT)
Dept: PHYSICAL THERAPY | Facility: CLINIC | Age: 54
End: 2024-11-07
Payer: COMMERCIAL

## 2024-11-07 LAB
ANION GAP SERPL CALCULATED.3IONS-SCNC: 13 MMOL/L (ref 7–15)
BASOPHILS # BLD AUTO: 0.1 10E3/UL (ref 0–0.2)
BASOPHILS NFR BLD AUTO: 1 %
BUN SERPL-MCNC: 13.5 MG/DL (ref 6–20)
CALCIUM SERPL-MCNC: 9 MG/DL (ref 8.8–10.4)
CHLORIDE SERPL-SCNC: 98 MMOL/L (ref 98–107)
CREAT SERPL-MCNC: 0.72 MG/DL (ref 0.67–1.17)
EGFRCR SERPLBLD CKD-EPI 2021: >90 ML/MIN/1.73M2
EOSINOPHIL # BLD AUTO: 0.2 10E3/UL (ref 0–0.7)
EOSINOPHIL NFR BLD AUTO: 2 %
ERYTHROCYTE [DISTWIDTH] IN BLOOD BY AUTOMATED COUNT: 11.9 % (ref 10–15)
GLUCOSE BLDC GLUCOMTR-MCNC: 259 MG/DL (ref 70–99)
GLUCOSE BLDC GLUCOMTR-MCNC: 267 MG/DL (ref 70–99)
GLUCOSE BLDC GLUCOMTR-MCNC: 281 MG/DL (ref 70–99)
GLUCOSE BLDC GLUCOMTR-MCNC: 284 MG/DL (ref 70–99)
GLUCOSE BLDC GLUCOMTR-MCNC: 292 MG/DL (ref 70–99)
GLUCOSE SERPL-MCNC: 289 MG/DL (ref 70–99)
HCO3 SERPL-SCNC: 23 MMOL/L (ref 22–29)
HCT VFR BLD AUTO: 38.7 % (ref 40–53)
HGB BLD-MCNC: 14 G/DL (ref 13.3–17.7)
IMM GRANULOCYTES # BLD: 0.1 10E3/UL
IMM GRANULOCYTES NFR BLD: 1 %
LYMPHOCYTES # BLD AUTO: 2 10E3/UL (ref 0.8–5.3)
LYMPHOCYTES NFR BLD AUTO: 24 %
MCH RBC QN AUTO: 29 PG (ref 26.5–33)
MCHC RBC AUTO-ENTMCNC: 36.2 G/DL (ref 31.5–36.5)
MCV RBC AUTO: 80 FL (ref 78–100)
MONOCYTES # BLD AUTO: 0.6 10E3/UL (ref 0–1.3)
MONOCYTES NFR BLD AUTO: 8 %
NEUTROPHILS # BLD AUTO: 5.4 10E3/UL (ref 1.6–8.3)
NEUTROPHILS NFR BLD AUTO: 65 %
NRBC # BLD AUTO: 0 10E3/UL
NRBC BLD AUTO-RTO: 0 /100
PLAT MORPH BLD: ABNORMAL
PLATELET # BLD AUTO: 251 10E3/UL (ref 150–450)
POTASSIUM SERPL-SCNC: 3.8 MMOL/L (ref 3.4–5.3)
RBC # BLD AUTO: 4.83 10E6/UL (ref 4.4–5.9)
RBC MORPH BLD: ABNORMAL
SODIUM SERPL-SCNC: 134 MMOL/L (ref 135–145)
VARIANT LYMPHS BLD QL SMEAR: PRESENT
WBC # BLD AUTO: 8.4 10E3/UL (ref 4–11)

## 2024-11-07 PROCEDURE — 99233 SBSQ HOSP IP/OBS HIGH 50: CPT | Performed by: INTERNAL MEDICINE

## 2024-11-07 PROCEDURE — 80048 BASIC METABOLIC PNL TOTAL CA: CPT | Performed by: INTERNAL MEDICINE

## 2024-11-07 PROCEDURE — 258N000003 HC RX IP 258 OP 636: Performed by: STUDENT IN AN ORGANIZED HEALTH CARE EDUCATION/TRAINING PROGRAM

## 2024-11-07 PROCEDURE — 250N000013 HC RX MED GY IP 250 OP 250 PS 637

## 2024-11-07 PROCEDURE — 99232 SBSQ HOSP IP/OBS MODERATE 35: CPT | Performed by: INTERNAL MEDICINE

## 2024-11-07 PROCEDURE — 97165 OT EVAL LOW COMPLEX 30 MIN: CPT | Mod: GO

## 2024-11-07 PROCEDURE — 250N000013 HC RX MED GY IP 250 OP 250 PS 637: Performed by: INTERNAL MEDICINE

## 2024-11-07 PROCEDURE — 97116 GAIT TRAINING THERAPY: CPT | Mod: GP

## 2024-11-07 PROCEDURE — 97530 THERAPEUTIC ACTIVITIES: CPT | Mod: GP

## 2024-11-07 PROCEDURE — 85004 AUTOMATED DIFF WBC COUNT: CPT | Performed by: INTERNAL MEDICINE

## 2024-11-07 PROCEDURE — 36415 COLL VENOUS BLD VENIPUNCTURE: CPT | Performed by: INTERNAL MEDICINE

## 2024-11-07 PROCEDURE — 250N000011 HC RX IP 250 OP 636

## 2024-11-07 PROCEDURE — 250N000011 HC RX IP 250 OP 636: Performed by: STUDENT IN AN ORGANIZED HEALTH CARE EDUCATION/TRAINING PROGRAM

## 2024-11-07 PROCEDURE — 120N000001 HC R&B MED SURG/OB

## 2024-11-07 PROCEDURE — 97535 SELF CARE MNGMENT TRAINING: CPT | Mod: GO

## 2024-11-07 RX ORDER — ACETAMINOPHEN 325 MG/1
650 TABLET ORAL EVERY 6 HOURS PRN
Qty: 60 TABLET | Refills: 0 | Status: SHIPPED | OUTPATIENT
Start: 2024-11-07

## 2024-11-07 RX ORDER — ASPIRIN 81 MG/1
81 TABLET ORAL 2 TIMES DAILY
Qty: 60 TABLET | Refills: 0 | Status: SHIPPED | OUTPATIENT
Start: 2024-11-07 | End: 2024-11-10

## 2024-11-07 RX ORDER — POLYETHYLENE GLYCOL 3350 17 G/17G
17 POWDER, FOR SOLUTION ORAL DAILY
Qty: 510 G | Refills: 0 | Status: SHIPPED | OUTPATIENT
Start: 2024-11-07 | End: 2024-11-10

## 2024-11-07 RX ORDER — CEFTRIAXONE 1 G/1
2000 INJECTION, POWDER, FOR SOLUTION INTRAMUSCULAR; INTRAVENOUS DAILY
Qty: 1 EACH | Refills: 1 | Status: SHIPPED | OUTPATIENT
Start: 2024-11-07 | End: 2024-11-10

## 2024-11-07 RX ORDER — OXYCODONE HYDROCHLORIDE 5 MG/1
2.5-5 TABLET ORAL EVERY 4 HOURS PRN
Qty: 10 TABLET | Refills: 0 | Status: SHIPPED | OUTPATIENT
Start: 2024-11-07 | End: 2024-11-12

## 2024-11-07 RX ADMIN — Medication 1 TABLET: at 08:21

## 2024-11-07 RX ADMIN — VANCOMYCIN HYDROCHLORIDE 2000 MG: 1 INJECTION, POWDER, LYOPHILIZED, FOR SOLUTION INTRAVENOUS at 08:30

## 2024-11-07 RX ADMIN — HYDROCHLOROTHIAZIDE 25 MG: 25 TABLET ORAL at 08:21

## 2024-11-07 RX ADMIN — ASPIRIN 81 MG: 81 TABLET, COATED ORAL at 20:08

## 2024-11-07 RX ADMIN — CEFTRIAXONE 2 G: 2 INJECTION, POWDER, FOR SOLUTION INTRAMUSCULAR; INTRAVENOUS at 20:08

## 2024-11-07 RX ADMIN — HYDROMORPHONE HYDROCHLORIDE 0.2 MG: 0.2 INJECTION, SOLUTION INTRAMUSCULAR; INTRAVENOUS; SUBCUTANEOUS at 18:37

## 2024-11-07 RX ADMIN — FAMOTIDINE 20 MG: 20 TABLET, FILM COATED ORAL at 08:22

## 2024-11-07 RX ADMIN — ASPIRIN 81 MG: 81 TABLET, COATED ORAL at 08:21

## 2024-11-07 RX ADMIN — FAMOTIDINE 20 MG: 20 TABLET, FILM COATED ORAL at 20:08

## 2024-11-07 RX ADMIN — LISINOPRIL 40 MG: 40 TABLET ORAL at 08:21

## 2024-11-07 RX ADMIN — HYDROMORPHONE HYDROCHLORIDE 0.2 MG: 0.2 INJECTION, SOLUTION INTRAMUSCULAR; INTRAVENOUS; SUBCUTANEOUS at 10:09

## 2024-11-07 RX ADMIN — HYDROMORPHONE HYDROCHLORIDE 0.2 MG: 0.2 INJECTION, SOLUTION INTRAMUSCULAR; INTRAVENOUS; SUBCUTANEOUS at 15:25

## 2024-11-07 RX ADMIN — INSULIN GLARGINE 8 UNITS: 100 INJECTION, SOLUTION SUBCUTANEOUS at 08:28

## 2024-11-07 RX ADMIN — SENNOSIDES AND DOCUSATE SODIUM 2 TABLET: 50; 8.6 TABLET ORAL at 08:28

## 2024-11-07 ASSESSMENT — ACTIVITIES OF DAILY LIVING (ADL)
PREVIOUS_RESPONSIBILITIES: MEAL PREP;HOUSEKEEPING;LAUNDRY;SHOPPING;WORK
ADLS_ACUITY_SCORE: 0
DEPENDENT_IADLS:: INDEPENDENT
ADLS_ACUITY_SCORE: 0

## 2024-11-07 NOTE — PROGRESS NOTES
[facility]    Received request for benefit check should pt require home IV abx. This patient has coverage for IV abx through their [facility]CP plan, and patient is covered at 100%.     Thank you    Liana Boyle RN  [facility] Liaison  [phone] (Mon thru Fri 8am - 5pm)  [phone] Office

## 2024-11-07 NOTE — PHARMACY-VANCOMYCIN DOSING SERVICE
Pharmacy Vancomycin Note  Date of Service 2024  Patient's  1970   54 year old, male    Indication: Bone and Joint Infection  Day of Therapy: 3  Current vancomycin regimen:  1500 mg IV q12h  Current vancomycin monitoring method: AUC  Current vancomycin therapeutic monitoring goal: 400-600 mg*h/L    Insight[employer] Prediction of Current Vancomycin Regimen  Regimen: 1500 mg IV every 12 hours.  Start time: 22:02 on 2024  Exposure target: AUC24 (range)400-600 mg/L.hr   AUC24,ss: 334 mg/L.hr  Probability of AUC24 > 400: 14 %  Ctrough,ss: 6.7 mg/L  Probability of Ctrough,ss > 20: 0 %  Probability of nephrotoxicity (Lodise ROSETTA ): 4 %    Current estimated CrCl = CrCl cannot be calculated (Unknown ideal weight.).    Creatinine for last 3 days  2024:  1:24 PM Creatinine 0.80 mg/dL  2024:  5:44 AM Creatinine 0.71 mg/dL  2024:  6:11 AM Creatinine 0.75 mg/dL    Recent Vancomycin Levels (past 3 days)  2024:  7:00 PM Vancomycin 8.0 ug/mL    Vancomycin IV Administrations (past 72 hours)                     vancomycin (VANCOCIN) 1,500 mg in 0.9% NaCl 265 mL intermittent infusion (mg) 1,500 mg New Bag 24 1002     1,500 mg New Bag 24 2246     1,500 mg New Bag  1048    vancomycin (VANCOCIN) 2,500 mg in 0.9% NaCl 525 mL intermittent infusion (mg) 2,500 mg New Bag 24 2206                    Nephrotoxins and other renal medications (From now, onward)      Start     Dose/Rate Route Frequency Ordered Stop    24  vancomycin (VANCOCIN) 2,000 mg in 0.9% NaCl 520 mL intermittent infusion         2,000 mg  over 2 Hours Intravenous EVERY 12 HOURS 24 1947      24 0800  lisinopril (ZESTRIL) tablet 40 mg        Note to Pharmacy: PTA Sig:Take 1 tablet (10 mg) by mouth daily      40 mg Oral DAILY 24                 Contrast Orders - past 72 hours (72h ago, onward)      None            Interpretation of levels and current regimen:  Vancomycin level is  reflective of -600    Has serum creatinine changed greater than 50% in last 72 hours: No    Urine output:  good urine output    Renal Function: Stable    InsightRX Prediction of Planned New Vancomycin Regimen  Regimen: 2000 mg IV every 12 hours.  Start time: 22:02 on 11/06/2024  Exposure target: AUC24 (range)400-600 mg/L.hr   AUC24,ss: 445 mg/L.hr  Probability of AUC24 > 400: 74 %  Ctrough,ss: 9.1 mg/L  Probability of Ctrough,ss > 20: 0 %  Probability of nephrotoxicity (Lodise ROSETTA 2009): 5 %    Plan:  Increase Dose to 2000 mg IV q12h  Vancomycin monitoring method: AUC  Vancomycin therapeutic monitoring goal: 400-600 mg*h/L  Pharmacy will check vancomycin levels as appropriate in 1-3 Days.  Serum creatinine levels will be ordered daily for the first week of therapy and at least twice weekly for subsequent weeks.    Lonny Mcmillan [facility]

## 2024-11-07 NOTE — PROGRESS NOTES
11/07/24 1415   Appointment Info   Signing Clinician's Name / Credentials (OT) Lanny Orona, MS, OTR/L   Living Environment   People in Home alone   Current Living Arrangements apartment   Home Accessibility   (no stairs)   Living Environment Comments Reports a friend may be able to assist at discharge. Reports his parents are involved but cannot provide physical assist   Self-Care   Usual Activity Tolerance excellent   Current Activity Tolerance moderate   Equipment Currently Used at Home   (tub/shower. comfort height toilet. Adjustable bed. Reclining couch)   Fall history within last six months no   Activity/Exercise/Self-Care Comment Pt reports at baseline he is independent in self-cares without gait aid   Instrumental Activities of Daily Living (IADL)   Previous Responsibilities meal prep;housekeeping;laundry;shopping;work   General Information   Onset of Illness/Injury or Date of Surgery 11/04/24   Referring Physician Consuelo Sanchez PA-C   Patient/Family Therapy Goal Statement (OT) Return home when able   Additional Occupational Profile Info/Pertinent History of Current Problem 54 year old male with PMHx significant for HTN, DM type II, polyneuropathy and GERD, who was admitted on 11/4/2024 with concerns for R knee septic arthritis. Postoperative state status post right knee arthrotomy and irrigation debridement last November 5, 2024   Existing Precautions/Restrictions fall   Cognitive Status Examination   Affect/Mental Status (Cognitive) WFL   Follows Commands follows two-step commands   Cognitive Status Comments Very pleasant. Laughs easily. Reports baseline learning disability   Visual Perception   Visual Impairment/Limitations corrective lenses for reading   Sensory   Sensory Comments Pt denies BUE/BLE numbness or tingling   Pain Assessment   Patient Currently in Pain Yes, see Vital Sign flowsheet   Transfers   Transfers toilet transfer;shower transfer;sit-stand transfer   Sit-Stand Transfer    Sit-Stand [place] (Transfers) contact guard   Shower Transfer   Edroy Level (Shower Transfer) moderate assist (50% patient effort)   Toilet Transfer   Edroy Level (Toilet Transfer) moderate assist (50% patient effort)   Activities of Daily Living   BADL Assessment/Intervention lower body dressing;toileting;grooming   Lower Body Dressing Assessment/Training   Edroy Level (Lower Body Dressing) moderate assist (50% patient effort)   Grooming Assessment/Training   Edroy Level (Grooming) contact guard assist   Toileting   Edroy Level (Toileting) minimum assist (75% patient effort)   Clinical Impression   Criteria for Skilled Therapeutic Interventions Met (OT) Yes, treatment indicated   OT Diagnosis Decline function   OT Problem List-Impairments impacting ADL activity tolerance impaired;balance;pain   Assessment of Occupational Performance 3-5 Performance Deficits   Identified Performance Deficits LB dressing, toileting, bathing, functional mobility, strenuous IADLs   Planned Therapy Interventions (OT) ADL retraining;home program guidelines;transfer training   Clinical Decision Making Complexity (OT) problem focused assessment/low complexity   Risk & Benefits of therapy have been explained evaluation/treatment results reviewed;care plan/treatment goals reviewed;risks/benefits reviewed;current/potential barriers reviewed;participants voiced agreement with care plan;participants included;patient   OT Total Evaluation Time   OT Eval, Low Complexity Minutes (91559) 9   OT Goals   Therapy Frequency (OT) Daily   OT Predicted Duration/Target Date for Goal Attainment 11/14/24   OT Goals Lower Body Dressing;Transfers;Toilet Transfer/Toileting;OT Goal 1   OT: Lower Body Dressing Modified independent;including set-up/clothing retrieval   OT: Transfer Modified independent  (tub/shower transfer)   OT: Toilet Transfer/Toileting Modified independent;toilet transfer;cleaning and garment  management;using adaptive equipment   OT: Goal 1 Pt will verbalize understanding of 3 energy conservation strategies to enhance IADL performance, fall prevention strategies, pain management strategies   Interventions   Interventions Quick Adds Self-Care/Home Management   Self-Care/Home Management   Self-Care/Home Mgmt/ADL, Compensatory, Meal Prep Minutes (19753) 26   OT Discharge Planning   OT Plan Bathroom AE. Donning socks. Tub/shower transfer   OT Discharge Recommendation (DC Rec) home with assist;home with home care occupational therapy;Leaving home requires significant assistance;Leaving home requires significant taxing effort  (Home OT/PT/RN)   OT Rationale for DC Rec Anticipate that with continued medical management, mobilization with nursing, and therapy participation that pt will progress to discharge home. Recommend bathroom adaptive equipment and assist with shopping, transportation. Recommend home OT to progress to baseline level of independence   OT Brief overview of current status CGA and cues for toilet transfer using over the toilet commode, donning undergarments, ambulation in room and bathroom   OT Equipment Needed at Discharge tub bench;raised toilet seat  (Grab bars)   Total Session Time   Timed Code Treatment Minutes 26   Total Session Time (sum of timed and untimed services) 35

## 2024-11-07 NOTE — PLAN OF CARE
"End of Shift Summary  For vital signs and complete assessments, please see documentation flowsheets.     Pertinent assessments: A&O x4. Bp slightly elevated bp (144/80) other VSS on RA. Denies N/V, SOB. Reporting right knee pain, prn oxy, tylenol, and IV dilaudid given x1 each. Up Ax1-2 with g/b and walker. Refusing activity this shift, not OOB. Using urinal at bedside. Hemavac to right knee with bloody/pink output. Right knee dressing CDI. Tolerating a regular diet, poor appetite. PIV SL x2. Rocephin and vanco given. , 281. POD  from lab draw.      Major Shift Events: Increased pain      Treatment Plan: Symptom management. Monitor BG. IV Rocephin & vanco. Ortho, PT/OT, ID following    Bedside Nurse: Melissa Jack RN                                   Goal Outcome Evaluation:      Plan of Care Reviewed With: patient    Overall Patient Progress: decliningOverall Patient Progress: declining    Outcome Evaluation: Increased pain meds needed this shift. PRN tylenol, PO oxy and IV dilaudid                Problem: Adult Inpatient Plan of Care  Goal: Plan of Care Review  Description: The Plan of Care Review/Shift note should be completed every shift.  The Outcome Evaluation is a brief statement about your assessment that the patient is improving, declining, or no change.  This information will be displayed automatically on your shift  note.  Outcome: Progressing  Flowsheets (Taken 11/7/2024 0522)  Outcome Evaluation: Increased pain meds needed this shift. PRN tylenol, PO oxy and IV dilaudid  Plan of Care Reviewed With: patient  Overall Patient Progress: declining  Goal: Patient-Specific Goal (Individualized)  Description: You can add care plan individualizations to a care plan. Examples of Individualization might be:  \"Parent requests to be called daily at 9am for status\", \"I have a hard time hearing out of my right ear\", or \"Do not touch me to wake me up as it startles  me\".  Outcome: Progressing  Goal: " Absence of Hospital-Acquired Illness or Injury  Outcome: Progressing  Intervention: Identify and Manage Fall Risk  Recent Flowsheet Documentation  Taken 11/6/2024 2015 by Melissa Jack RN  Safety Promotion/Fall Prevention:   activity supervised   assistive device/personal items within reach   clutter free environment maintained   nonskid shoes/slippers when out of bed   patient and family education   room near nurse's station   room organization consistent   safety round/check completed   supervised activity  Intervention: Prevent Skin Injury  Recent Flowsheet Documentation  Taken 11/6/2024 2015 by Melissa Jack RN  Body Position: position changed independently  Intervention: Prevent and Manage VTE (Venous Thromboembolism) Risk  Recent Flowsheet Documentation  Taken 11/6/2024 2015 by Melissa Jack RN  VTE Prevention/Management: SCDs off (sequential compression devices)  Intervention: Prevent Infection  Recent Flowsheet Documentation  Taken 11/6/2024 2015 by Melissa Jack RN  Infection Prevention:   cohorting utilized   hand hygiene promoted   rest/sleep promoted   single patient room provided  Goal: Optimal Comfort and Wellbeing  Outcome: Progressing  Intervention: Monitor Pain and Promote Comfort  Recent Flowsheet Documentation  Taken 11/6/2024 2015 by Melissa Jack RN  Pain Management Interventions: declines  Goal: Readiness for Transition of Care  Outcome: Progressing     Problem: Pain Acute  Goal: Optimal Pain Control and Function  Outcome: Progressing  Intervention: Develop Pain Management Plan  Recent Flowsheet Documentation  Taken 11/6/2024 2015 by Melissa Jack RN  Pain Management Interventions: declines  Intervention: Prevent or Manage Pain  Recent Flowsheet Documentation  Taken 11/6/2024 2015 by Melissa Jack RN  Sleep/Rest Enhancement:   awakenings minimized   consistent schedule promoted   noise level reduced   regular sleep/rest pattern promoted   room  darkened  Medication Review/Management: medications reviewed     Problem: Comorbidity Management  Goal: Blood Glucose Levels Within Targeted Range  Outcome: Progressing  Intervention: Monitor and Manage Glycemia  Recent Flowsheet Documentation  Taken 11/6/2024 2015 by Melissa Jack RN  Medication Review/Management: medications reviewed     Problem: Skin Injury Risk Increased  Goal: Skin Health and Integrity  Outcome: Progressing  Intervention: Plan: Nurse Driven Intervention: Moisture Management  Recent Flowsheet Documentation  Taken 11/6/2024 2015 by Melissa Jack RN  Moisture Interventions:   Encourage regular toileting   No brief in bed   Incontinence pad  Bathing/Skin Care: other (see comments)  Intervention: Plan: Nurse Driven Intervention: Friction and Shear  Recent Flowsheet Documentation  Taken 11/6/2024 2015 by Melissa Jack RN  Friction/Shear Interventions: HOB 30 degrees or less  Intervention: Optimize Skin Protection  Recent Flowsheet Documentation  Taken 11/6/2024 2015 by Melissa Jack RN  Activity Management:   activity adjusted per tolerance   activity encouraged  Head of Bed (HOB) Positioning: HOB at 20-30 degrees

## 2024-11-07 NOTE — PROGRESS NOTES
[facility]    Medicine Progress Note - Hospitalist Service    Date of Admission:  11/4/2024    Assessment & Plan   Scout Perez is a 54 year old male with PMHx significant for HTN, DM type II, polyneuropathy and GERD, who was admitted on 11/4/2024 with concerns for R knee septic arthritis.     R knee septic arthritis  Recent dental procedure/possible infection, reported fevers in October:  Postoperative state status post right knee arthrotomy and irrigation debridement last November 5, 2024  Sudden onset of R knee pain, swelling and erythema. No joint hardware, notes recent dental infection. Hx of DM, HgbA1c 8.7 ten days ago.  R knee aspirated in ED, 97,500 nucleated cells, culture and gram stain pending.  Delay in gram stain results so in order to not delay treatment, he was given IV Rocephin and Vanco in ED.    - Concern over possible seeding.   Patient with vague dental issues since September.  He had teeth extraction.  Reports not on antibiotics.  Had some intermittent flushed/fever episodes in October with temp 101 by his report.  Dental issues had somewhat improved but then right knee issue developed as above.    -No significant leukocytosis  -Cultures remain no growth to date    - continue IV Rocephin and Vanco for now.  No crystals seen on exam of fluid.    -Highly appreciate extensive and prompt input from operative orthopedics service.  -ID service following with us.  Several differentials being considered.  Lyme disease workup sent earlier.  -Will follow infectious markers, previous cultures were sent earlier.  -Will defer further optimization of antibiotics intervention with ID service  -Optimize pain control.  Aspirin for DVT prophylaxis twice daily dosing as per Ortho recommendations     DM type II, poor control increasing infection risk  Polyneuropathy:  Glucose 421 in ED, last HgbA1c 8.7 on 10/23/24. Per chart review, pt has low health literacy. He has been on metformin and  glipizide, Jardiance was started ~10 days ago. Per pt, he stopped this due to some issues with reflux, unclear.  -Now with uncontrolled glucose levels.  He thinks his oral intake might improve later on.  He mentioned that he has not eaten much at least in the past several weeks.  -Now still demonstrated uncontrolled hyperglycemia.  Increase Lantus and added prandial insulin coverage.  -Holding metformin for now.  May be able to resume this in  the next day or so if creatinine remained permitting  -We will hold patient's metformin for now until he demonstrates better oral intake and creatinine remained permitting as patient is also receiving systemic vancomycin.       HTN:  Managed with Lisinopril and hydrochlorothiazide.  -Resume home regimen     GERD:  Recently started on Famotidine by PCP, did not tolerate PPI  - famotidine              Diet: Regular Diet Adult  Snacks/Supplements Adult: Glucerna; Between Meals    DVT Prophylaxis: Pneumatic Compression Devices and orthopedics recommending aspirin for DVT prophylaxis  Choudhury Catheter: Not present  Lines: None     Cardiac Monitoring: None  Code Status: Full Code      Clinically Significant Risk Factors         # Hyponatremia: Lowest Na = 133 mmol/L in last 2 days, will monitor as appropriate  # Hypochloremia: Lowest Cl = 97 mmol/L in last 2 days, will monitor as appropriate          # Hypertension: Noted on problem list                       Disposition Plan     Medically Ready for Discharge: Anticipated in 2-4 Days             Rigoberto Barnett MD, MD  Hospitalist Service  [facility]  Securely message with [employer] (more info)  Text page via [employer] Paging/Directory   ______________________________________________________________________    Interval History   Continuing medicine service care today.  Seen and examined.  Chart reviewed.   Afebrile.   Still having intermittent discomfort and pain has not dissipated on his right lower extremity  No  reported nausea or vomiting.  Still having elevated blood sugar levels.  No issues with diarrhea.  Remained afebrile.  No reported shortness of breath or chest pain.  Currently not requiring oxygen support  No significant mental status changes noted overnight           Physical Exam   Vital Signs: Temp: 98.7  F (37.1  C) Temp src: Oral BP: 135/84 Pulse: 94   Resp: 16 SpO2: 97 % O2 Device: None (Room air)    Weight: 260 lbs 2.28 oz    HEENT; Atraumatic, normocephalic, pinkish conjuctiva, pupils bilateral reactive   Skin: warm and moist, no rashes  Lymphatics: no cervical or axillary lymphandenopathy  Lungs: equal chest expansion, clear to auscultation, no wheezes, no stridor, no crackles,   Heart: normal rate, normal rhythm, no rubs or gallops.   Abdomen: normal bowel sounds, no tenderness, no peritoneal signs, no guarding  Extremities: no deformities, no edema   Several skin excoriations on left lower extremities, right Ace wrap bandage lower extremity in dressing seen.  No bleeding.  Neuro; follow commands, alert and oriented x3, spontaneous speech, coherent, moves all extremities spontaneously  Psych; no hallucination, euthymic mood, not agitated      Medical Decision Making       50 MINUTES SPENT BY ME on the date of service doing chart review, history, exam, documentation & further activities per the note.  MANAGEMENT DISCUSSED with the following over the past 24 hours: Yes   NOTE(S)/MEDICAL RECORDS REVIEWED over the past 24 hours: Yes       Data     I have personally reviewed the following data over the past 24 hrs:    8.4  \   14.0   / 251     134 (L) 98 13.5 /  284 (H)   3.8 23 0.72 \         Imaging results reviewed over the past 24 hrs:   No results found for this or any previous visit (from the past 24 hours).

## 2024-11-07 NOTE — PLAN OF CARE
"Goal Outcome Evaluation:      Plan of Care Reviewed With: patient    Overall Patient Progress: improvingOverall Patient Progress: improving    Outcome Evaluation: R.knee pain controlled wth pain medications, up on the recliner this shift. continues with poc.    End of shift summary-0700-1500  Dx: R.Knee septic Athritis.   A/O: Alert and Oriented x4  Diet: Regular  Fluids: is Saline locked.  Transfer: are 1 Assist with Gait Belt and Walker  Bathroom:uses bedside urinal, no BM this shift.   Pain: complaining of 5/10 pain in their right knee.  Dilaudid given for pain.  Medicatons relieved pain.  Telemetry Monitoring: No  Treatment: Abx tx, pain management, Hemovac.    Discharge Plans:TBD, Continues with poc.        Blood pressure 135/84, pulse 94, temperature 98.7  F (37.1  C), temperature source Oral, resp. rate 16, weight 118 kg (260 lb 2.3 oz), SpO2 97%.     Problem: Adult Inpatient Plan of Care  Goal: Plan of Care Review  Description: The Plan of Care Review/Shift note should be completed every shift.  The Outcome Evaluation is a brief statement about your assessment that the patient is improving, declining, or no change.  This information will be displayed automatically on your shift  note.  11/7/2024 1221 by Toni Wright RN  Outcome: Progressing  Flowsheets (Taken 11/7/2024 1221)  Outcome Evaluation: R.knee pain controlled wth pain medications, up on the recliner this shift. continues with poc.  Plan of Care Reviewed With: patient  Overall Patient Progress: improving  11/7/2024 1208 by Toni Wright RN  Outcome: Progressing  Flowsheets (Taken 11/7/2024 1208)  Outcome Evaluation: pain to R.Knee controlled withpain meds, continues with ongoing poc.  Plan of Care Reviewed With: patient  Overall Patient Progress: improving  Goal: Patient-Specific Goal (Individualized)  Description: You can add care plan individualizations to a care plan. Examples of Individualization might be:  \"Parent requests to be " "called daily at 9am for status\", \"I have a hard time hearing out of my right ear\", or \"Do not touch me to wake me up as it startles  me\".  11/7/2024 1221 by Toni Wright RN  Outcome: Progressing  11/7/2024 1208 by Toni Wright RN  Outcome: Progressing  Goal: Absence of Hospital-Acquired Illness or Injury  11/7/2024 1221 by Toni Wright RN  Outcome: Progressing  11/7/2024 1208 by Toni Wright RN  Outcome: Progressing  Intervention: Identify and Manage Fall Risk  Recent Flowsheet Documentation  Taken 11/7/2024 1100 by Toni Wright RN  Safety Promotion/Fall Prevention:   activity supervised   assistive device/personal items within reach   clutter free environment maintained   nonskid shoes/slippers when out of bed   patient and family education   room near nurse's station   room organization consistent   safety round/check completed   supervised activity  Intervention: Prevent and Manage VTE (Venous Thromboembolism) Risk  Recent Flowsheet Documentation  Taken 11/7/2024 1100 by Toni Wright RN  VTE Prevention/Management: SCDs off (sequential compression devices)  Intervention: Prevent Infection  Recent Flowsheet Documentation  Taken 11/7/2024 1100 by Toni Wright RN  Infection Prevention:   cohorting utilized   hand hygiene promoted   rest/sleep promoted   single patient room provided  Goal: Optimal Comfort and Wellbeing  11/7/2024 1221 by Toni Wright RN  Outcome: Progressing  11/7/2024 1208 by Toni Wright RN  Outcome: Progressing  Goal: Readiness for Transition of Care  11/7/2024 1221 by Toni Wright RN  Outcome: Progressing  11/7/2024 1208 by Toni Wright RN  Outcome: Progressing     Problem: Pain Acute  Goal: Optimal Pain Control and Function  11/7/2024 1221 by Toni Wright RN  Outcome: Progressing  11/7/2024 1208 by Toni Wright RN  Outcome: Progressing  Intervention: Prevent or Manage Pain  Recent Flowsheet Documentation  Taken " 11/7/2024 1100 by Toni Wright RN  Medication Review/Management: medications reviewed     Problem: Comorbidity Management  Goal: Blood Glucose Levels Within Targeted Range  11/7/2024 1221 by Toni Wright RN  Outcome: Progressing  11/7/2024 1208 by Toni Wright RN  Outcome: Progressing  Intervention: Monitor and Manage Glycemia  Recent Flowsheet Documentation  Taken 11/7/2024 1100 by Toni Wright RN  Medication Review/Management: medications reviewed     Problem: Skin Injury Risk Increased  Goal: Skin Health and Integrity  11/7/2024 1221 by Toni Wright RN  Outcome: Progressing  11/7/2024 1208 by Toni Wright RN  Outcome: Progressing  Intervention: Plan: Nurse Driven Intervention: Moisture Management  Recent Flowsheet Documentation  Taken 11/7/2024 1100 by Toni Wright RN  Moisture Interventions: Encourage regular toileting  Intervention: Plan: Nurse Driven Intervention: Friction and Shear  Recent Flowsheet Documentation  Taken 11/7/2024 1100 by Toni Wright RN  Friction/Shear Interventions: HOB 30 degrees or less

## 2024-11-07 NOTE — PROGRESS NOTES
[facility]  Infectious Disease Progress Note          Assessment and Plan:   Date of Admission:  11/4/2024  Date of Consult (When I saw the patient): 11/05/24        Assessment & Plan  Scout Perez is a 54 year old male who was admitted on 11/4/2024.      Impression: 1 54-year-old male, acute new right knee pain, tap consistent with infection, status post operative I&D, cultures all pending  2 vague low-grade fever and systemic symptoms for several weeks before knee pain, question bacteremic or other underlying disease, prominent the differential diagnosis here is Lyme disease  3 diabetes mellitus  4 right maxillary tooth extraction preceding symptoms without obvious infection or antibiotics     REC 1 knee stable, cultures all remain negative, statistically the most likely thing here is that this is not infection and noninfectious inflammatory knee process, but overall picture suggested infection.  Given negative culture very unlikely MRSA so we will stop Vanco, will do a 3-week course of IV ceftriaxone which will cover the majority of likely pathogens, then if knee is not improving or more joint symptoms develop reconsider options  2 Lyme disease a consideration given his exposure but negative Lyme serology with Lyme arthritis mostly excludes this, of note if that is the diagnosis ceftriaxone course will also be curative  Midline and IV orders in              Interval History:     no new complaints no systemic symptoms, knee pain still about the same, cultures all negative so far crystals negative,               Medications:     Current Facility-Administered Medications   Medication Dose Route Frequency Provider Last Rate Last Admin    aspirin EC tablet 81 mg  81 mg Oral BID Consuelo Sanchez PA-C   81 mg at 11/07/24 0821    cefTRIAXone (ROCEPHIN) 2 g vial to attach to  ml bag for ADULTS or NS 50 ml bag for PEDS  2 g Intravenous Q24H Consuelo Sanchez PA-C   2 g at 11/06/24 2015     famotidine (PEPCID) tablet 20 mg  20 mg Oral BID Consuelo Sanchez PA-C   20 mg at 11/07/24 0822    [Held by provider] glipiZIDE (GLUCOTROL XL) 24 hr tablet 10 mg  10 mg Oral BID Rochelle Kaur PA-C        hydrochlorothiazide (HYDRODIURIL) tablet 25 mg  25 mg Oral Daily Rigoberto Barnett MD   25 mg at 11/07/24 0821    insulin aspart (NovoLOG) injection (RAPID ACTING)   Subcutaneous TID w/meals Rigoberto Barnett MD        insulin aspart (NovoLOG) injection (RAPID ACTING)  1-7 Units Subcutaneous TID AC Rigoberto Barnett MD   3 Units at 11/07/24 0829    insulin aspart (NovoLOG) injection (RAPID ACTING)  1-5 Units Subcutaneous At Bedtime Rigoberto Barnett MD   3 Units at 11/06/24 2216    [START ON 11/8/2024] insulin glargine (LANTUS PEN) injection 15 Units  15 Units Subcutaneous QAM AC Rigoberto Barnett MD        lisinopril (ZESTRIL) tablet 40 mg  40 mg Oral Daily Rigoberto Barnett MD   40 mg at 11/07/24 0821    [Held by provider] metFORMIN (GLUCOPHAGE XR) 24 hr tablet 1,000 mg  1,000 mg Oral BID w/meals Rigoberto Barnett MD        multivitamin w/minerals (THERA-VIT-M) tablet 1 tablet  1 tablet Oral Daily Rigoberto Barnett MD   1 tablet at 11/07/24 0821    sodium chloride (PF) 0.9% PF flush 3 mL  3 mL Intracatheter Q8H Consuelo Sanchez PA-C   3 mL at 11/06/24 1404    sodium chloride (PF) 0.9% PF flush 3 mL  3 mL Intracatheter Q8H Consuelo Sanchez PA-C   3 mL at 11/06/24 2015                  Physical Exam:   Blood pressure 135/84, pulse 94, temperature 98.7  F (37.1  C), temperature source Oral, resp. rate 16, weight 118 kg (260 lb 2.3 oz), SpO2 97%.  Wt Readings from Last 2 Encounters:   11/04/24 118 kg (260 lb 2.3 oz)   08/04/23 121.8 kg (268 lb 9.6 oz)     Vital Signs with Ranges  Temp:  [98.3  F (36.8  C)-98.7  F (37.1  C)] 98.7  F (37.1  C)  Pulse:  [85-94] 94  Resp:  [16-18] 16  BP: (135-150)/(80-84) 135/84  SpO2:  [94 %-98 %] 97 %    Constitutional: Awake, alert, cooperative, no  "apparent distress     Lungs: Clear to auscultation bilaterally, no crackles or wheezing   Cardiovascular: Regular rate and rhythm, normal S1 and S2, and no murmur noted   Abdomen: Normal bowel sounds, soft, non-distended, non-tender   Skin: No rashes, no cyanosis, no edema   Other: Knee wrapped          Data:   All microbiology laboratory data reviewed.  Recent Labs   Lab Test 11/07/24  0550 11/06/24  0611 11/05/24  0544   WBC 8.4 12.6* 12.2*   HGB 14.0 14.1 14.1   HCT 38.7* 39.5* 38.8*   MCV 80 80 80    241 225     Recent Labs   Lab Test 11/07/24  0550 11/06/24  0611 11/05/24  0544   CR 0.72 0.75 0.71     Recent Labs   Lab Test 08/04/23  1045   SED 4     No lab results found.    Invalid input(s): \"UC\"     "

## 2024-11-07 NOTE — CONSULTS
Care Management Initial Consult    General Information  Assessment completed with: Patient,    Type of CM/SW Visit: Initial Assessment    Primary Care Provider verified and updated as needed:     Readmission within the last 30 days:        Reason for Consult: care coordination/care conference, discharge planning  Advance Care Planning:            Communication Assessment  Patient's communication style: spoken language (English or Bilingual)    Hearing Difficulty or Deaf: no   Wear Glasses or Blind: yes    Cognitive  Cognitive/Neuro/Behavioral: WDL  Level of Consciousness: alert                   Living Environment:   People in home: alone     Current living Arrangements: apartment      Able to return to prior arrangements:  (TBD)       Family/Social Support:  Care provided by:    Provides care for: no one  Marital Status: Single  Support system: Parent(s), Friend          Description of Support System: Supportive, Involved         Current Resources:   Patient receiving home care services: No        Community Resources:    Equipment currently used at home: none  Supplies currently used at home:      Employment/Financial:  Employment Status: employed part-time        Financial Concerns:             Does the patient's insurance plan have a 3 day qualifying hospital stay waiver?  Yes     Which insurance plan 3 day waiver is available? Alternative insurance waiver    Will the waiver be used for post-acute placement? No    Lifestyle & Psychosocial Needs:  Social Drivers of Health     Food Insecurity: Low Risk  (11/4/2024)    Food Insecurity     Within the past 12 months, did you worry that your food would run out before you got money to buy more?: No     Within the past 12 months, did the food you bought just not last and you didn t have money to get more?: No   Depression: At risk (9/9/2023)    [facility]    PHQ-2     PHQ-2 Score: 4   Housing Stability: Low Risk  (11/4/2024)    Housing  Stability     Do you have housing? : Yes     Are you worried about losing your housing?: No   Tobacco Use: Low Risk  (11/5/2024)    Patient History     Smoking Tobacco Use: Never     Smokeless Tobacco Use: Never     Passive Exposure: Not on file   Financial Resource Strain: Low Risk  (11/4/2024)    Financial Resource Strain     Within the past 12 months, have you or your family members you live with been unable to get utilities (heat, electricity) when it was really needed?: No   Alcohol Use: Not on file   Transportation Needs: Low Risk  (11/4/2024)    Transportation Needs     Within the past 12 months, has lack of transportation kept you from medical appointments, getting your medicines, non-medical meetings or appointments, work, or from getting things that you need?: No   Physical Activity: Not on file   Interpersonal Safety: Low Risk  (11/4/2024)    Interpersonal Safety     Do you feel physically and emotionally safe where you currently live?: Yes     Within the past 12 months, have you been hit, slapped, kicked or otherwise physically hurt by someone?: No     Within the past 12 months, have you been humiliated or emotionally abused in other ways by your partner or ex-partner?: No   Stress: Not on file   Social Connections: Unknown (1/1/2022)    [facility], [facility]    Social Connections     Frequency of Communication with Friends and Family: Not on file   Health Literacy: Not on file       Functional Status:  Prior to admission patient needed assistance:   Dependent ADLs:: Independent  Dependent IADLs:: Independent       Discussed  Partnership in Safe Discharge Planning  document with patient/family: No    Additional Information:  CM consult placed for discharge planning. Patient admitted with knee pain found to have R knee septic arthritis now s/p right knee arthrotomy and I&D. Per chart review patient will need extended course  of IV anbx and home therapies.  Met with patient at the bedside to introduce CM role and discuss discharge planning. Patient alert and oriented and willing to participate in the conversation and discission making process for discharge. During conversation patient noted that he has a learning disability. He needs to process things and he also requests on discharge to have any instructions clear and written out for him.  Patient confirms he lives independently in a second floor one level apartment that has elevator access. He works part time in retail currently.  Discussed recommendations for extended IV anbx and PT recommendations of home with home care . Explained that CM has sent a benefit check to see if he has coverage for home infusion.  Discussed options of:  Home to his apartment with home infusion and home PT. He thinks that his friend may be able to be available and possibly stay part of the day or even night with him. This option would provide one level living.  Home to his parents with home infusion and home PT. He has concerns about the 7 stairs and his ability to do this and his parents being older and not able to physically assist but mom could potentially help with IV anbx  Home to either his apt or parents going to outpatient infusion for his anbx. Explained that we have several infusion clinics and it may involve getting rides to different locations.    Patient asking appropriate questions and trying to talk through his options. He asked if CM thought he could do the anbx by himself. Explained that people do go home doing these independently but it will be based on his comfort level and ability to learn. He would like to see what his benefit check for coverage is and possibly have CM discuss with his mother as well. He will be talking with her tonight.    Of note for discharge planning. If patient decides to go to his parents home this is the address on the facesheet that he uses for his mail.   If  he goes back to his apartment the address is:  [address]   [address] [place] [address]    Benefit check pending but have contacted [facility] liaison to provided some background information on above. She will reach out tomorrow before introducing patient to services.    CM will continue to assist with discharge planning.    Dede Lechuga RN BSN OCN  Care Coordinator  [facility]  [phone]

## 2024-11-07 NOTE — PROGRESS NOTES
Orthopedic Surgery  Scout Perez  11/07/2024     Admit Date:  11/4/2024    POD: 2 Days Post-Op   Procedure(s):  Right knee arthrotomy and irrigation debridement right knee for infection, deepest layer synovium     Patient resting comfortably in chair.    Reports his knee pain is improved today vs yesterday.   Tolerating oral intake.    Denies nausea or vomiting  Denies chest pain or shortness of breath    Temp:  [98.3  F (36.8  C)-98.7  F (37.1  C)] 98.7  F (37.1  C)  Pulse:  [85-94] 94  Resp:  [16-18] 16  BP: (135-150)/(80-84) 135/84  SpO2:  [94 %-98 %] 97 %    Alert and oriented  Dressing is clean, dry, and intact.   Hemovac drain remains in place. With compression of the tubing, able to get continued output.   Minimal erythema of the surrounding skin.  Bilateral calves are soft, non-tender.  Right lower extremity is NVI.  Sensation intact bilateral lower extremities  Patient able to resist dorsi and plantar flexion bilaterally  + Dp pulse    Labs:  Recent Labs   Lab Test 11/07/24  0550 11/06/24  0611 11/05/24  0544   WBC 8.4 12.6* 12.2*   HGB 14.0 14.1 14.1    241 225     No lab results found.  Recent Labs   Lab Test 11/05/24  0544   CRPI 195.48*     Output by Drain (mL) 11/05/24 0700 - 11/05/24 1459 11/05/24 1500 - 11/05/24 2259 11/05/24 2300 - 11/06/24 0659 11/06/24 0700 - 11/06/24 1459 11/06/24 1500 - 11/06/24 2259 11/06/24 2300 - 11/07/24 0659 11/07/24 0700 - 11/07/24 1258   Closed/Suction Drain 1 Right;Anterior Knee Accordion 10 Syriac     50       All cultures:  Recent Labs   Lab 11/05/24  1155 11/05/24  1153 11/04/24  1828 11/04/24  1736 11/04/24  1324   CULTURE No growth after 1 day  No anaerobic organisms isolated after 1 day  See corresponding culture for results No growth after 1 day  No anaerobic organisms isolated after 1 day  See corresponding culture for results No growth after 2 days No growth after 2 days No growth after 2 days         1. PLAN:   ASA 81 mg BID for DVT prophylaxis.      Abx per ID:   Ceftrixone    Mobilize with PT/OT    WBAT RLE   Continue current pain regimen   Dressings: Keep intact.  Change if >60% saturated or peeling off.    Leave Hemovac drain intact until less than 30 ml per shift.  Likely will remove tomorrow.     Follow-up: 2 weeks post-op with Dr. Guerra team    2. Disposition   Anticipate d/c to TCU when medically cleared and progressing in PT.    JING HongC

## 2024-11-07 NOTE — PLAN OF CARE
"Goal Outcome Evaluation:      Plan of Care Reviewed With: patient    Overall Patient Progress: decliningOverall Patient Progress: declining    Outcome Evaluation: No nausea. RLL pain with movement. Bilateral pedal pulses 2+. Baseline N/T of feet. I observed under ace wrap, gauze CDI. Hemovac minimal drainage - red/bloody. Pt reports no BM 2+ weeks. Audible bowel sounds. Not oob 4322-7785. 50% diet dinner. sliding scale insuline for high 200s. Cultures pending.      Problem: Adult Inpatient Plan of Care  Goal: Plan of Care Review  Description: The Plan of Care Review/Shift note should be completed every shift.  The Outcome Evaluation is a brief statement about your assessment that the patient is improving, declining, or no change.  This information will be displayed automatically on your shift  note.  Outcome: Not Progressing  Flowsheets (Taken 11/6/2024 1933)  Outcome Evaluation: No nausea. RLL pain with movement. Bilateral pedal pulses 2+. Baseline N/T of feet. I observed under ace wrap, gauze CDI. Hemovac minimal drainage - red/bloody. Pt reports no BM 2+ weeks. Audible bowel sounds. Not oob 4731-7887. 50% diet dinner. sliding scale insuline for high 200s. Cultures pending.  Plan of Care Reviewed With: patient  Overall Patient Progress: declining  Goal: Patient-Specific Goal (Individualized)  Description: You can add care plan individualizations to a care plan. Examples of Individualization might be:  \"Parent requests to be called daily at 9am for status\", \"I have a hard time hearing out of my right ear\", or \"Do not touch me to wake me up as it startles  me\".  Outcome: Not Progressing  Goal: Absence of Hospital-Acquired Illness or Injury  Outcome: Not Progressing  Intervention: Prevent Skin Injury  Recent Flowsheet Documentation  Taken 11/6/2024 1639 by Guido Hays RN  Body Position: position changed independently  Goal: Optimal Comfort and Wellbeing  Outcome: Not Progressing  Goal: Readiness for Transition of " Care  Outcome: Not Progressing     Problem: Pain Acute  Goal: Optimal Pain Control and Function  Outcome: Not Progressing     Problem: Comorbidity Management  Goal: Blood Glucose Levels Within Targeted Range  Outcome: Not Progressing     Problem: Skin Injury Risk Increased  Goal: Skin Health and Integrity  Outcome: Not Progressing  Intervention: Optimize Skin Protection  Recent Flowsheet Documentation  Taken 11/6/2024 1639 by Guido Hays, RN  Head of Bed (HOB) Positioning: HOB at 20-30 degrees

## 2024-11-08 ENCOUNTER — APPOINTMENT (OUTPATIENT)
Dept: OCCUPATIONAL THERAPY | Facility: CLINIC | Age: 54
End: 2024-11-08
Payer: COMMERCIAL

## 2024-11-08 ENCOUNTER — EPISODE UPDATE (OUTPATIENT)
Dept: HOME HEALTH SERVICES | Facility: HOME HEALTH | Age: 54
End: 2024-11-08
Payer: COMMERCIAL

## 2024-11-08 ENCOUNTER — APPOINTMENT (OUTPATIENT)
Dept: PHYSICAL THERAPY | Facility: CLINIC | Age: 54
End: 2024-11-08
Payer: COMMERCIAL

## 2024-11-08 ENCOUNTER — HOME INFUSION (OUTPATIENT)
Dept: HOME HEALTH SERVICES | Facility: HOME HEALTH | Age: 54
End: 2024-11-08
Payer: COMMERCIAL

## 2024-11-08 DIAGNOSIS — M00.9: Primary | ICD-10-CM

## 2024-11-08 LAB
CREAT SERPL-MCNC: 0.74 MG/DL (ref 0.67–1.17)
EGFRCR SERPLBLD CKD-EPI 2021: >90 ML/MIN/1.73M2
GLUCOSE BLDC GLUCOMTR-MCNC: 290 MG/DL (ref 70–99)
GLUCOSE BLDC GLUCOMTR-MCNC: 318 MG/DL (ref 70–99)
GLUCOSE BLDC GLUCOMTR-MCNC: 357 MG/DL (ref 70–99)
GLUCOSE BLDC GLUCOMTR-MCNC: 396 MG/DL (ref 70–99)
HOLD SPECIMEN: NORMAL

## 2024-11-08 PROCEDURE — 250N000013 HC RX MED GY IP 250 OP 250 PS 637: Performed by: INTERNAL MEDICINE

## 2024-11-08 PROCEDURE — 250N000013 HC RX MED GY IP 250 OP 250 PS 637

## 2024-11-08 PROCEDURE — 99232 SBSQ HOSP IP/OBS MODERATE 35: CPT | Performed by: INTERNAL MEDICINE

## 2024-11-08 PROCEDURE — 97530 THERAPEUTIC ACTIVITIES: CPT | Mod: GP

## 2024-11-08 PROCEDURE — 250N000011 HC RX IP 250 OP 636

## 2024-11-08 PROCEDURE — 82565 ASSAY OF CREATININE: CPT

## 2024-11-08 PROCEDURE — 97110 THERAPEUTIC EXERCISES: CPT | Mod: GP

## 2024-11-08 PROCEDURE — 36415 COLL VENOUS BLD VENIPUNCTURE: CPT

## 2024-11-08 PROCEDURE — 250N000013 HC RX MED GY IP 250 OP 250 PS 637: Performed by: STUDENT IN AN ORGANIZED HEALTH CARE EDUCATION/TRAINING PROGRAM

## 2024-11-08 PROCEDURE — 97535 SELF CARE MNGMENT TRAINING: CPT | Mod: GO

## 2024-11-08 PROCEDURE — 97116 GAIT TRAINING THERAPY: CPT | Mod: GP

## 2024-11-08 PROCEDURE — 120N000001 HC R&B MED SURG/OB

## 2024-11-08 PROCEDURE — 99233 SBSQ HOSP IP/OBS HIGH 50: CPT | Performed by: INTERNAL MEDICINE

## 2024-11-08 RX ORDER — METFORMIN HYDROCHLORIDE 500 MG/1
500 TABLET, EXTENDED RELEASE ORAL 2 TIMES DAILY WITH MEALS
Status: DISCONTINUED | OUTPATIENT
Start: 2024-11-08 | End: 2024-11-10 | Stop reason: HOSPADM

## 2024-11-08 RX ORDER — METHOCARBAMOL 750 MG/1
750 TABLET, FILM COATED ORAL 4 TIMES DAILY PRN
Status: DISCONTINUED | OUTPATIENT
Start: 2024-11-08 | End: 2024-11-10 | Stop reason: HOSPADM

## 2024-11-08 RX ORDER — GLIPIZIDE 10 MG/1
10 TABLET, FILM COATED, EXTENDED RELEASE ORAL
Status: DISCONTINUED | OUTPATIENT
Start: 2024-11-09 | End: 2024-11-10 | Stop reason: HOSPADM

## 2024-11-08 RX ADMIN — METFORMIN ER 500 MG 500 MG: 500 TABLET ORAL at 17:24

## 2024-11-08 RX ADMIN — POLYETHYLENE GLYCOL 3350 17 G: 17 POWDER, FOR SOLUTION ORAL at 12:05

## 2024-11-08 RX ADMIN — FAMOTIDINE 20 MG: 20 TABLET, FILM COATED ORAL at 20:08

## 2024-11-08 RX ADMIN — HYDROCHLOROTHIAZIDE 25 MG: 25 TABLET ORAL at 08:29

## 2024-11-08 RX ADMIN — Medication 1 TABLET: at 08:29

## 2024-11-08 RX ADMIN — OXYCODONE HYDROCHLORIDE 5 MG: 5 TABLET ORAL at 06:23

## 2024-11-08 RX ADMIN — ASPIRIN 81 MG: 81 TABLET, COATED ORAL at 08:29

## 2024-11-08 RX ADMIN — METHOCARBAMOL 750 MG: 750 TABLET ORAL at 19:29

## 2024-11-08 RX ADMIN — FAMOTIDINE 20 MG: 20 TABLET, FILM COATED ORAL at 08:29

## 2024-11-08 RX ADMIN — LISINOPRIL 40 MG: 40 TABLET ORAL at 08:29

## 2024-11-08 RX ADMIN — CEFTRIAXONE 2 G: 2 INJECTION, POWDER, FOR SOLUTION INTRAMUSCULAR; INTRAVENOUS at 21:02

## 2024-11-08 RX ADMIN — OXYCODONE HYDROCHLORIDE 5 MG: 5 TABLET ORAL at 10:49

## 2024-11-08 RX ADMIN — ASPIRIN 81 MG: 81 TABLET, COATED ORAL at 20:08

## 2024-11-08 RX ADMIN — METHOCARBAMOL 750 MG: 750 TABLET ORAL at 09:26

## 2024-11-08 RX ADMIN — OXYCODONE HYDROCHLORIDE 5 MG: 5 TABLET ORAL at 19:29

## 2024-11-08 RX ADMIN — SENNOSIDES AND DOCUSATE SODIUM 2 TABLET: 50; 8.6 TABLET ORAL at 12:04

## 2024-11-08 NOTE — PLAN OF CARE
"Goal Outcome Evaluation:      Plan of Care Reviewed With: patient    Overall Patient Progress: improvingOverall Patient Progress: improving    Outcome Evaluation: Pain controlled with prn dilaudid, increased pain with movement, vanco and rocephin, BG monitoring    To Do:  End of Shift Summary  For vital signs and complete assessments, please see documentation flowsheets.     Pertinent assessments: A&Ox4. BP elevated  (142/87) other VSS, on RA. Denies N/V, SOB. Reporting right knee pain, prn dilaudid given x2. WBAT on R leg, up Ax1 with walker and GB. Increased pain with movement. Hemovac to right knee with bright red output. Right knee ace wrap CDI. Tolerating a regular diet, poor appetite. PIV SL x2. , 292. Up in chair most of shift.     Major Shift Events: uneventful      Treatment Plan: Symptom management. Monitor BG. IV Rocephin & vanco. Ortho, PT/OT, ID following.     Bedside Nurse: Cameron Bernstein RN          Problem: Adult Inpatient Plan of Care  Goal: Plan of Care Review  Description: The Plan of Care Review/Shift note should be completed every shift.  The Outcome Evaluation is a brief statement about your assessment that the patient is improving, declining, or no change.  This information will be displayed automatically on your shift  note.  Outcome: Progressing  Flowsheets (Taken 11/7/2024 2239)  Outcome Evaluation: Pain controlled with prn dilaudid, increased pain with movement, vanco and rocephin, BG monitoring  Plan of Care Reviewed With: patient  Overall Patient Progress: improving  Goal: Patient-Specific Goal (Individualized)  Description: You can add care plan individualizations to a care plan. Examples of Individualization might be:  \"Parent requests to be called daily at 9am for status\", \"I have a hard time hearing out of my right ear\", or \"Do not touch me to wake me up as it startles  me\".  Outcome: Progressing  Goal: Absence of Hospital-Acquired Illness or Injury  Outcome: " Progressing  Intervention: Identify and Manage Fall Risk  Recent Flowsheet Documentation  Taken 11/7/2024 1600 by Cameron Bernstein RN  Safety Promotion/Fall Prevention:   activity supervised   assistive device/personal items within reach   clutter free environment maintained   nonskid shoes/slippers when out of bed   patient and family education   room near nurse's station   room organization consistent   safety round/check completed   mobility aid in reach  Intervention: Prevent Skin Injury  Recent Flowsheet Documentation  Taken 11/7/2024 2228 by Cameron Bernstein RN  Body Position: side-lying  Taken 11/7/2024 2145 by Cameron Bernstein RN  Body Position:   position changed independently   legs elevated  Taken 11/7/2024 1837 by Cameron Bernstein RN  Body Position: position changed independently  Intervention: Prevent and Manage VTE (Venous Thromboembolism) Risk  Recent Flowsheet Documentation  Taken 11/7/2024 1600 by Cameron Bernstein RN  VTE Prevention/Management: SCDs off (sequential compression devices)  Intervention: Prevent Infection  Recent Flowsheet Documentation  Taken 11/7/2024 1600 by Cameron Bernstein RN  Infection Prevention:   cohorting utilized   hand hygiene promoted   rest/sleep promoted   single patient room provided   personal protective equipment utilized  Goal: Optimal Comfort and Wellbeing  Outcome: Progressing  Intervention: Monitor Pain and Promote Comfort  Recent Flowsheet Documentation  Taken 11/7/2024 1837 by Cameron Bernstein RN  Pain Management Interventions: medication (see MAR)  Taken 11/7/2024 1525 by Cameron Bernstein RN  Pain Management Interventions: medication (see MAR)  Goal: Readiness for Transition of Care  Outcome: Progressing     Problem: Pain Acute  Goal: Optimal Pain Control and Function  Outcome: Progressing  Intervention: Develop Pain Management Plan  Recent Flowsheet Documentation  Taken 11/7/2024 1837 by Cameron Bernstein RN  Pain Management Interventions: medication (see MAR)  Taken  11/7/2024 1525 by Cameron Bernstein RN  Pain Management Interventions: medication (see MAR)  Intervention: Prevent or Manage Pain  Recent Flowsheet Documentation  Taken 11/7/2024 1600 by Cameron Bernstein RN  Medication Review/Management: medications reviewed     Problem: Comorbidity Management  Goal: Blood Glucose Levels Within Targeted Range  Outcome: Progressing  Intervention: Monitor and Manage Glycemia  Recent Flowsheet Documentation  Taken 11/7/2024 1600 by Cameron Bernstein RN  Medication Review/Management: medications reviewed     Problem: Skin Injury Risk Increased  Goal: Skin Health and Integrity  Outcome: Progressing  Intervention: Plan: Nurse Driven Intervention: Moisture Management  Recent Flowsheet Documentation  Taken 11/7/2024 1600 by Cameron Bernstein RN  Moisture Interventions:   Encourage regular toileting   Incontinence pad   Urinary collection device  Taken 11/7/2024 1537 by Cameron Bernstein RN  Bathing/Skin Care: hair washed  Intervention: Plan: Nurse Driven Intervention: Friction and Shear  Recent Flowsheet Documentation  Taken 11/7/2024 1600 by Cameron Bernstein RN  Friction/Shear Interventions: HOB 30 degrees or less  Intervention: Optimize Skin Protection  Recent Flowsheet Documentation  Taken 11/7/2024 2228 by Cameron Bernstein RN  Head of Bed (HOB) Positioning: HOB at 15 degrees  Taken 11/7/2024 2145 by Cameron Bernstein RN  Activity Management: activity adjusted per tolerance  Head of Bed (HOB) Positioning: HOB at 20-30 degrees  Taken 11/7/2024 1837 by Cameron Bernstein RN  Activity Management:   ambulated in room   back to bed  Head of Bed (HOB) Positioning: HOB at 20-30 degrees  Taken 11/7/2024 1537 by Cameron Bernstein RN  Activity Management:   ambulated in room   activity encouraged

## 2024-11-08 NOTE — PROGRESS NOTES
[facility]    Medicine Progress Note - Hospitalist Service    Date of Admission:  11/4/2024    Assessment & Plan   Scout Perez is a 54 year old male with PMHx significant for HTN, DM type II, polyneuropathy and GERD, who was admitted on 11/4/2024 with concerns for R knee septic arthritis.     R knee septic arthritis  Recent dental procedure/possible infection, reported fevers in October:  Postoperative state status post right knee arthrotomy and irrigation debridement last November 5, 2024  Sudden onset of R knee pain, swelling and erythema. No joint hardware, notes recent dental infection. Hx of DM, HgbA1c 8.7 ten days ago.  R knee aspirated in ED, 97,500 nucleated cells, culture and gram stain pending.  Delay in gram stain results so in order to not delay treatment, he was given IV Rocephin and Vanco in ED.     - Concern over possible seeding.   Patient with vague dental issues since September.  He had teeth extraction.  Reports not on antibiotics at that time pre/post dental procedure.  Had some intermittent flushed/fever episodes in October with temp 101 by his report.  Dental issues had somewhat improved but then right knee issue developed as above.      -No significant leukocytosis (last CBC 11/7/24)  No fevers  -Cultures remain no growth to date     - has been on IV vanco and IV rocephin - switched to IV rocephin alone on 11/7/24 per infectious disease   No crystals seen on exam of fluid.    -Highly appreciate extensive and prompt input from operative orthopedics service and infectious disease    Lyme disease workup has been negative.    Continue Aspirin for DVT prophylaxis twice daily dosing as per Ortho recommendations     Per ID - orders placed for MIDLINE and 3-week total course of IV rocephin                 DM type II, poor control increasing infection risk  Polyneuropathy:    Glucose 421 in ED, last HgbA1c 8.7 on 10/23/24. Per chart review, pt has low health literacy. He has  been on metformin and glipizide, Jardiance was started ~10 days ago. Per pt, he stopped this due to some issues with reflux, unclear.  -Now with uncontrolled glucose levels.  He thinks his oral intake might improve later on.  He mentioned that he has not eaten much at least in the past several weeks.    11/8/24 - Blood sugars still elevated 200-300's                 Will restart metformin and glipizide today at reduced doses                 Increase scheduled lantus to 18 units daily (15units)              Modify diet today to MODERATE CARB     HTN:  Managed with Lisinopril and hydrochlorothiazide.  -Resume home regimen    SBP still up and down (140-150's)  Continue pain control and close clinical monitoring       GERD:  Recently started on Famotidine by PCP, did not tolerate PPI  - famotidine to continue       PPE Used:  Mask, gloves          Diet: Regular Diet Adult  Snacks/Supplements Adult: Glucerna; Between Meals    DVT Prophylaxis: Ambulate every shift  Choudhury Catheter: Not present  Lines: None     Cardiac Monitoring: None  Code Status: Full Code      Clinically Significant Risk Factors         # Hyponatremia: Lowest Na = 134 mmol/L in last 2 days, will monitor as appropriate           # Hypertension: Noted on problem list                       Disposition Plan     Medically Ready for Discharge: Anticipated Tomorrow             Courtney Pablo DO  Hospitalist Service  [facility]  Securely message with [employer] (more info)  Text page via [employer] Paging/Directory   ______________________________________________________________________    Interval History     Doing well.  Still slightly painful to bear weight on the right knee at times.  Denies CP, SOB, F/C or N/V.  Trying to decide on discharge planning.  No other new concerns per nursing staff.    Has many appropriate questions about source of infection, plan for abx treatment and discharge.    Physical Exam   Vital Signs: Temp: 98.5   F (36.9  C) Temp src: Oral BP: (!) 153/88 Pulse: 77   Resp: 16 SpO2: 98 % O2 Device: None (Room air)    Weight: 260 lbs 2.28 oz    GEN:  Alert, oriented x 3, appears comfortable, no overt distress.  Easily conversant  HEENT:  Normocephalic/atraumatic, no scleral icterus, no nasal discharge, mouth moist.  CV:  Regular rate and rhythm, no loud murmur. S1 + S2 noted  LUNGS:  Clear to auscultation ant/lat bilaterally without rales/rhonchi/wheezing/retractions.  Symmetric chest rise on inhalation noted.  ABD:  Active bowel sounds, soft, non-tender/non-distended.  No clear rebound/guarding/rigidity.  EXT:  right knee is wrapped with an ACE wrap which is clean and dry  No significant edema or cyanosis visualized on the left leg.        Medical Decision Making       51 MINUTES SPENT BY ME on the date of service doing chart review, history, exam, documentation & further activities per the note.      Data   Medications   Current Facility-Administered Medications   Medication Dose Route Frequency Provider Last Rate Last Admin     Current Facility-Administered Medications   Medication Dose Route Frequency Provider Last Rate Last Admin    aspirin EC tablet 81 mg  81 mg Oral BID Consuelo Sanchez PA-C   81 mg at 11/07/24 2008    cefTRIAXone (ROCEPHIN) 2 g vial to attach to  ml bag for ADULTS or NS 50 ml bag for PEDS  2 g Intravenous Q24H Consuelo Sanchez PA-C   2 g at 11/07/24 2008    famotidine (PEPCID) tablet 20 mg  20 mg Oral BID Consuelo Sanchez PA-C   20 mg at 11/07/24 2008    [Held by provider] glipiZIDE (GLUCOTROL XL) 24 hr tablet 10 mg  10 mg Oral BID Rochelle Kaur PA-C        hydrochlorothiazide (HYDRODIURIL) tablet 25 mg  25 mg Oral Daily Rigoberto Barnett MD   25 mg at 11/07/24 0821    insulin aspart (NovoLOG) injection (RAPID ACTING)   Subcutaneous TID w/meals Rigoberto Barnett MD   2 Units at 11/07/24 1706    insulin aspart (NovoLOG) injection (RAPID ACTING)  1-7 Units Subcutaneous TID AC Rigoberto Barnett  MD Cy   3 Units at 11/07/24 1706    insulin aspart (NovoLOG) injection (RAPID ACTING)  1-5 Units Subcutaneous At Bedtime Rigoberto Barnett MD   2 Units at 11/07/24 2207    insulin glargine (LANTUS PEN) injection 15 Units  15 Units Subcutaneous QAM AC Rigoberto Barnett MD        lisinopril (ZESTRIL) tablet 40 mg  40 mg Oral Daily Rigoberto Barnett MD   40 mg at 11/07/24 0821    [Held by provider] metFORMIN (GLUCOPHAGE XR) 24 hr tablet 1,000 mg  1,000 mg Oral BID w/meals Rigoberto Barnett MD        multivitamin w/minerals (THERA-VIT-M) tablet 1 tablet  1 tablet Oral Daily Rigoberto Barnett MD   1 tablet at 11/07/24 0821    sodium chloride (PF) 0.9% PF flush 3 mL  3 mL Intracatheter Q8H Consuelo Sanchez PA-C   3 mL at 11/07/24 2208    sodium chloride (PF) 0.9% PF flush 3 mL  3 mL Intracatheter Q8H Consuelo Sanchez PA-C   3 mL at 11/07/24 2007     Labs and Imaging results below reviewed today.  Recent Labs   Lab 11/07/24  0550 11/06/24  0611 11/05/24  0544   WBC 8.4 12.6* 12.2*   HGB 14.0 14.1 14.1   HCT 38.7* 39.5* 38.8*   MCV 80 80 80    241 225     Recent Labs   Lab 11/08/24  1156 11/08/24  0829 11/08/24  0658 11/07/24  2135 11/07/24  0758 11/07/24  0550 11/06/24  0959 11/06/24  0611 11/05/24  0638 11/05/24  0544   NA  --   --   --   --   --  134*  --  133*  --  129*   POTASSIUM  --   --   --   --   --  3.8  --  3.7  --  3.6   CHLORIDE  --   --   --   --   --  98  --  97*  --  96*   CO2  --   --   --   --   --  23  --  22  --  22   ANIONGAP  --   --   --   --   --  13  --  14  --  11   * 396*  --  292*   < > 289*   < > 266*   < > 265*   BUN  --   --   --   --   --  13.5  --  12.8  --  10.7   CR  --   --  0.74  --   --  0.72  --  0.75  --  0.71   GFRESTIMATED  --   --  >90  --   --  >90  --  >90  --  >90   ARELY  --   --   --   --   --  9.0  --  9.1  --  9.0    < > = values in this interval not displayed.     7-Day Micro Results       Collected Updated Procedure Result Status       11/05/2024 1155 11/07/2024 1546 Anaerobic Bacterial Culture Routine [[id number]]    Tissue from Knee, Right    Preliminary result Component Value   Culture No anaerobic organisms isolated after 2 days  [P]                11/05/2024 1155 11/05/2024 1555 Gram Stain [[id number]]   Tissue from Knee, Right    Final result Component Value   GS Culture See corresponding culture for results   Gram Stain Result No organisms seen   Gram Stain Result 4+ WBC seen   Predominantly PMNs            11/05/2024 1155 11/08/2024 0748 Tissue Aerobic Bacterial Culture Routine [[id number]]    Tissue from Knee, Right    Preliminary result Component Value   Culture No growth after 2 days  [P]                11/05/2024 1153 11/07/2024 1546 Anaerobic Bacterial Culture Routine [[id number]]    Tissue from Knee, Right    Preliminary result Component Value   Culture No anaerobic organisms isolated after 2 days  [P]                11/05/2024 1153 11/05/2024 1616 Gram Stain [[id number]]   Tissue from Knee, Right    Final result Component Value   GS Culture See corresponding culture for results   Gram Stain Result No organisms seen   Gram Stain Result 4+ WBC seen   Predominantly PMNs            11/05/2024 1153 11/08/2024 0748 Tissue Aerobic Bacterial Culture Routine [[id number]]    Tissue from Knee, Right    Preliminary result Component Value   Culture No growth after 2 days  [P]                11/04/2024 1828 11/07/2024 2031 Blood Culture Peripheral Blood [[id number]]   Peripheral Blood    Preliminary result Component Value   Culture No growth after 3 days  [P]                11/04/2024 1736 11/04/2024 1959 Cell count with differential fluid [[id number]]    (Abnormal)   Synovial fluid from Knee, Right    Final result Component Value   No component results            11/04/2024 1736 11/08/2024 0732 Synovial fluid Aerobic Bacterial Culture Routine With Gram Stain [[id number]]   Synovial fluid from Knee, Right    Preliminary result  "Component Value   Culture No growth after 3 days  [P]    Gram Stain Result No organisms seen  [P]     4+ WBC seen  [P]     Predominantly PMNs               11/04/2024 1736 11/04/2024 2105 Crystal ID Synovial Fluid [[id number]]   Synovial fluid from Knee, Right    Final result Component Value   Crystals Analysis No clinically significant crystals seen.            11/04/2024 1736 11/04/2024 1959 Cell Count Body Fluid [[id number]]    (Abnormal)   Synovial fluid from Knee, Right    Final result Component Value Units   Color Yellow    Clarity Turbid    This is a corrected result. Previous result was Cloudy on 11/4/2024 at  7:00 PM CST   Cell Count Fluid Source Knee, Right    Total Nucleated Cells 97,500 /uL            11/04/2024 1736 11/04/2024 1959 Differential Body Fluid [[id number]]    Synovial fluid from Knee, Right    Final result Component Value Units   % Neutrophils 86 %   % Lymphocytes 9 %   % Monocyte/Macrophages 5 %            11/04/2024 1324 11/07/2024 1631 Blood Culture Hand, Right [[id number]]   Blood from Hand, Right    Preliminary result Component Value   Culture No growth after 3 days  [P]                      Recent Labs   Lab 11/07/24  0550 11/06/24  0611 11/05/24  0544   HGB 14.0 14.1 14.1     Recent Labs   Lab 11/04/24  1324   AST 13   ALT 42   ALKPHOS 76   BILITOTAL 1.5*     No results for input(s): \"TROPONIN\", \"TROPI\", \"TROPR\", \"TROPONINIS\" in the last 168 hours.    Invalid input(s): \"TROPT\", \"TROP\", \"TROPONINIES\", \"TNIH\"  No results for input(s): \"COLOR\", \"APPEARANCE\", \"URINEGLC\", \"URINEBILI\", \"URINEKETONE\", \"SG\", \"UBLD\", \"URINEPH\", \"PROTEIN\", \"UROBILINOGEN\", \"NITRITE\", \"LEUKEST\", \"RBCU\", \"WBCU\" in the last 168 hours.    No results found for this or any previous visit (from the past 24 hours).    "

## 2024-11-08 NOTE — PLAN OF CARE
"Pt up Ax1 with walker & belt. A&O. VSS Hemovac removed today. R leg in ace wrap. C/o R knee pain/cramping, giving PRN Oxycodone & Robaxin. Milka diet, appetite improving, BG monitoring, denies nausea. No BM for 2 wks per pt, gave PRN Miralax & Senna. Voiding w/o any issues. PIV SL.   Goal Outcome Evaluation:      Plan of Care Reviewed With: patient    Overall Patient Progress: improvingOverall Patient Progress: improving    Outcome Evaluation: Rocephin    Problem: Adult Inpatient Plan of Care  Goal: Plan of Care Review  Description: The Plan of Care Review/Shift note should be completed every shift.  The Outcome Evaluation is a brief statement about your assessment that the patient is improving, declining, or no change.  This information will be displayed automatically on your shift  note.  11/8/2024 1556 by Danii Rey RN  Outcome: Progressing  Flowsheets (Taken 11/8/2024 1556)  Outcome Evaluation: Rocephin  Plan of Care Reviewed With: patient  Overall Patient Progress: improving  11/8/2024 1555 by Danii Rey RN  Outcome: Progressing  Flowsheets (Taken 11/8/2024 1555)  Outcome Evaluation: Rocephin  Goal: Patient-Specific Goal (Individualized)  Description: You can add care plan individualizations to a care plan. Examples of Individualization might be:  \"Parent requests to be called daily at 9am for status\", \"I have a hard time hearing out of my right ear\", or \"Do not touch me to wake me up as it startles  me\".  11/8/2024 1556 by Danii Rey, RN  Outcome: Progressing  11/8/2024 1555 by Danii Rey, RN  Outcome: Progressing  Goal: Absence of Hospital-Acquired Illness or Injury  11/8/2024 1556 by Danii Rey RN  Outcome: Progressing  11/8/2024 1555 by Danii Rey RN  Outcome: Progressing  Intervention: Identify and Manage Fall Risk  Recent Flowsheet Documentation  Taken 11/8/2024 0900 by Danii Rey, RN  Safety Promotion/Fall Prevention:   activity supervised   nonskid shoes/slippers when out of " bed  Intervention: Prevent Skin Injury  Recent Flowsheet Documentation  Taken 11/8/2024 0900 by Danii Rey RN  Body Position: position changed independently  Intervention: Prevent and Manage VTE (Venous Thromboembolism) Risk  Recent Flowsheet Documentation  Taken 11/8/2024 0900 by Danii Rey RN  VTE Prevention/Management: SCDs off (sequential compression devices)  Goal: Optimal Comfort and Wellbeing  11/8/2024 1556 by Danii Rey RN  Outcome: Progressing  11/8/2024 1555 by Danii Rey RN  Outcome: Progressing  Goal: Readiness for Transition of Care  11/8/2024 1556 by Danii Rey RN  Outcome: Progressing  11/8/2024 1555 by Danii Rey RN  Outcome: Progressing

## 2024-11-08 NOTE — PLAN OF CARE
"End of Shift Summary  For vital signs and complete assessments, please see documentation flowsheets.     Pertinent assessments: Assumed care at 2300. A&Ox4. No c/o pain overnight. Slept well. VSS. Hemovac to right knee with bright red output. Ace wrap to right knee CDI. Regular diet.     Major Shift Events: uneventful     Treatment Plan: pain management. IV rocephin and vanco. PT/OT. ID following.     Goal Outcome Evaluation:      Plan of Care Reviewed With: patient    Overall Patient Progress: improvingOverall Patient Progress: improving    Outcome Evaluation: Pain control      Problem: Adult Inpatient Plan of Care  Goal: Plan of Care Review  Description: The Plan of Care Review/Shift note should be completed every shift.  The Outcome Evaluation is a brief statement about your assessment that the patient is improving, declining, or no change.  This information will be displayed automatically on your shift  note.  Outcome: Progressing  Flowsheets (Taken 11/8/2024 0543)  Outcome Evaluation: Pain control  Plan of Care Reviewed With: patient  Overall Patient Progress: improving  Goal: Patient-Specific Goal (Individualized)  Description: You can add care plan individualizations to a care plan. Examples of Individualization might be:  \"Parent requests to be called daily at 9am for status\", \"I have a hard time hearing out of my right ear\", or \"Do not touch me to wake me up as it startles  me\".  Outcome: Progressing  Goal: Absence of Hospital-Acquired Illness or Injury  Outcome: Progressing  Intervention: Identify and Manage Fall Risk  Recent Flowsheet Documentation  Taken 11/8/2024 0200 by Kasey Zamudio RN  Safety Promotion/Fall Prevention:   activity supervised   assistive device/personal items within reach   clutter free environment maintained   nonskid shoes/slippers when out of bed   patient and family education   room near nurse's station   room organization consistent   safety round/check completed   mobility aid " in reach  Intervention: Prevent and Manage VTE (Venous Thromboembolism) Risk  Recent Flowsheet Documentation  Taken 11/8/2024 0200 by Kasey Zamudio RN  VTE Prevention/Management: SCDs off (sequential compression devices)  Goal: Optimal Comfort and Wellbeing  Outcome: Progressing  Goal: Readiness for Transition of Care  Outcome: Progressing     Problem: Pain Acute  Goal: Optimal Pain Control and Function  Outcome: Progressing  Intervention: Prevent or Manage Pain  Recent Flowsheet Documentation  Taken 11/8/2024 0200 by Kasey Zamudio RN  Medication Review/Management: medications reviewed     Problem: Comorbidity Management  Goal: Blood Glucose Levels Within Targeted Range  Outcome: Progressing  Intervention: Monitor and Manage Glycemia  Recent Flowsheet Documentation  Taken 11/8/2024 0200 by Kasey Zamudio RN  Medication Review/Management: medications reviewed     Problem: Skin Injury Risk Increased  Goal: Skin Health and Integrity  Outcome: Progressing  Intervention: Plan: Nurse Driven Intervention: Moisture Management  Recent Flowsheet Documentation  Taken 11/8/2024 0200 by Kasey Zamudio RN  Moisture Interventions: Encourage regular toileting  Intervention: Plan: Nurse Driven Intervention: Friction and Shear  Recent Flowsheet Documentation  Taken 11/8/2024 0200 by Kasey Zamudio RN  Friction/Shear Interventions: HOB 30 degrees or less

## 2024-11-08 NOTE — PROGRESS NOTES
Care Management Follow Up    Length of Stay (days): 4    Expected Discharge Date: 11/08/2024     Concerns to be Addressed: care coordination/care conferences     Patient plan of care discussed at interdisciplinary rounds: Yes    Anticipated Discharge Disposition: Skilled Nursing Facility, Transitional Care              Anticipated Discharge Services: None  Anticipated Discharge DME: None    Patient/family educated on Medicare website which has current facility and service quality ratings: yes  Education Provided on the Discharge Plan:    Patient/Family in Agreement with the Plan: yes    Referrals Placed by CM/SW: Post Acute Facilities  Private pay costs discussed: private room/amenity fees    Discussed  Partnership in Safe Discharge Planning  document with patient/family: No     Handoff Completed: No, handoff not indicated or clinically appropriate    Additional Information:  CM following for discharge planning needs with IV antibiotics. Discussed discharge plan this am with Physical Therapy after their am session. Per Physical Therapy, they are recommending Transitional Care Unit for additional strengthening prior to patient returning home. Met with patient at bedside to discuss discharge recommendations for Transitional Care Unit and the Transitional Care Unit process. Packet was given and discussed. Patient would like [place] or [facility] for rehab. He agreed to have referrals sent to [facility]. These were sent via Epic. We discussed private vs shared room and cost associated. He would like a shared room.     He still need IV PICC/midline placement done before discharge. Per ID notes, patient will need a 3-week course of IV ceftriaxone. Patient understands his need for IV antibiotics post discharge. Benefit check was done and patient would have 100% coverage for home IV antibiotics. Updated [facility] that patient will have Transitional Care Unit referrals sent. Will  cont to monitor patient progress if he needs to stay over the weekend.     Next Steps: Transitional Care Unit referrals were made, awaiting response. Updated [facility] of patient decision for Transitional Care Unit. Will cont to follow    ADDENDUM 1430:  Patient has been accepted by [facility] Transitional Care Unit into a shared room. He will need a midline placed prior to discharge. There is no VAT today but will be in tomorrow. Message sent to Transitional Care Unit Liaison inquiring if patient can come over the weekend. Waiting to hear back. Met with patient at bedside to discuss bed availability. He would like to accept the bed at [facility]. He feels his mother could transport him to Transitional Care Unit. Updated him that per ID, he will need 18 days of IV antibiotics on discharge. He is agreeable to plan. Will update patient as able.     Ryan Carvajal can take patient over the weekend once he gets his PICC/midline placed. CM to call [phone] when patient is discharge ready to finalize plan.    Transitional Care Unit needs orders by 1500 on 11/9 and can take him anytime      Sarah Owens RN BSN CM  Inpatient Care Coordination  [facility]  [phone]

## 2024-11-08 NOTE — PROGRESS NOTES
Orthopedic Surgery  Scout Perez  11/08/2024     Admit Date:  11/4/2024    POD: 3 Days Post-Op   Procedure(s):  Right knee arthrotomy and irrigation debridement right knee for infection, deepest layer synovium     Patient resting comfortably in chair.    Reports his knee pain is improved today vs yesterday overall. He continues to have occasional cramping, and robaxin has been ordered.   Tolerating oral intake.    Denies nausea or vomiting  Denies chest pain or shortness of breath    Temp:  [98.2  F (36.8  C)-98.7  F (37.1  C)] 98.2  F (36.8  C)  Pulse:  [77-83] 83  Resp:  [16] 16  BP: (141-153)/(81-88) 141/81  SpO2:  [95 %-98 %] 97 %    Alert and oriented  Dressing is clean, dry, and intact.   Hemovac drain removed at bedside today. Moderate amount of serosanguineous drainage after removal which slowed down within several minutes.  Minimal erythema of the surrounding skin.  Bilateral calves are soft, non-tender.  Right lower extremity is NVI.  Sensation intact bilateral lower extremities  Patient able to resist dorsi and plantar flexion bilaterally  3+ Dp pulse    Labs:  Recent Labs   Lab Test 11/07/24  0550 11/06/24  0611 11/05/24  0544   WBC 8.4 12.6* 12.2*   HGB 14.0 14.1 14.1    241 225     No lab results found.  Recent Labs   Lab Test 11/05/24  0544   CRPI 195.48*     Output by Drain (mL) 11/06/24 0700 - 11/06/24 1459 11/06/24 1500 - 11/06/24 2259 11/06/24 2300 - 11/07/24 0659 11/07/24 0700 - 11/07/24 1459 11/07/24 1500 - 11/07/24 2259 11/07/24 2300 - 11/08/24 0659 11/08/24 0700 - 11/08/24 1132   Closed/Suction Drain 1 Right;Anterior Knee Accordion 10 Lao  50   50 0      All cultures:  Recent Labs   Lab 11/05/24  1155 11/05/24  1153 11/04/24  1828 11/04/24  1736 11/04/24  1324   CULTURE No growth after 2 days  No anaerobic organisms isolated after 2 days  See corresponding culture for results No growth after 2 days  No anaerobic organisms isolated after 2 days  See corresponding culture for  results No growth after 3 days No growth after 3 days No growth after 3 days         1. PLAN:   ASA 81 mg BID for DVT prophylaxis.     Abx per ID:   Ceftrixone    Mobilize with PT/OT    WBAT RLE   Continue current pain regimen   Dressings: Keep intact.  Change if >60% saturated or peeling off.    Hemovac drain removed today    Follow-up: 2 weeks post-op with Dr. Guerra team    2. Disposition   Anticipate d/c to TCU when medically cleared and progressing in PT.    Ella Baker PA-C

## 2024-11-08 NOTE — PROGRESS NOTES
[facility]  Infectious Disease Progress Note          Assessment and Plan:   Date of Admission:  11/4/2024  Date of Consult (When I saw the patient): 11/05/24        Assessment & Plan  Scout Perez is a 54 year old male who was admitted on 11/4/2024.      Impression: 1 54-year-old male, acute new right knee pain, tap consistent with infection, status post operative I&D, cultures all pending  2 vague low-grade fever and systemic symptoms for several weeks before knee pain, question bacteremic or other underlying disease, prominent the differential diagnosis here is Lyme disease  3 diabetes mellitus  4 right maxillary tooth extraction preceding symptoms without obvious infection or antibiotics     REC 1 knee stable, cultures all remain negative, statistically the most likely thing here is that this is not infection and noninfectious inflammatory knee process, but overall picture suggested infection.  Given negative culture very unlikely MRSA so we will stop Vanco, will do a 3-week course of IV ceftriaxone which will cover the majority of likely pathogens, then if knee is not improving or more joint symptoms develop reconsider options  2 Lyme disease a consideration given his exposure but negative Lyme serology with Lyme arthritis mostly excludes this, of note if that is the diagnosis ceftriaxone course will also be curative  Midline and IV orders in, okay disposition when all arranged, will follow labs and clinical picture as an outpatient if worsens or new joint symptoms reconsider diagnosis and approach              Interval History:     no new complaints no systemic symptoms, knee pain still about the same, cultures all negative so far crystals negative,               Medications:     Current Facility-Administered Medications   Medication Dose Route Frequency Provider Last Rate Last Admin    aspirin EC tablet 81 mg  81 mg Oral BID Consuelo Sanchez PA-C   81 mg at 11/08/24 0890     "cefTRIAXone (ROCEPHIN) 2 g vial to attach to  ml bag for ADULTS or NS 50 ml bag for PEDS  2 g Intravenous Q24H Consuelo Sanchez PA-C   2 g at 11/07/24 2008    famotidine (PEPCID) tablet 20 mg  20 mg Oral BID Consuelo Sanchez PA-C   20 mg at 11/08/24 0829    [Held by provider] glipiZIDE (GLUCOTROL XL) 24 hr tablet 10 mg  10 mg Oral BID Rochelle Kaur PA-C        hydrochlorothiazide (HYDRODIURIL) tablet 25 mg  25 mg Oral Daily Rigoberto Barnett MD   25 mg at 11/08/24 0829    insulin aspart (NovoLOG) injection (RAPID ACTING)   Subcutaneous TID w/meals Rigoberto Barnett MD   5 Units at 11/08/24 1310    insulin aspart (NovoLOG) injection (RAPID ACTING)  1-7 Units Subcutaneous TID  Rigoberto Barnett MD   4 Units at 11/08/24 1309    insulin aspart (NovoLOG) injection (RAPID ACTING)  1-5 Units Subcutaneous At Bedtime Rigoberto Barnett MD   2 Units at 11/07/24 2207    insulin glargine (LANTUS PEN) injection 15 Units  15 Units Subcutaneous QAM  Rigoberto Barnett MD   15 Units at 11/08/24 0835    lisinopril (ZESTRIL) tablet 40 mg  40 mg Oral Daily Rigoberto Barnett MD   40 mg at 11/08/24 0829    [Held by provider] metFORMIN (GLUCOPHAGE XR) 24 hr tablet 1,000 mg  1,000 mg Oral BID w/meals Rigoberto Barnett MD        multivitamin w/minerals (THERA-VIT-M) tablet 1 tablet  1 tablet Oral Daily Rigoberto Barnett MD   1 tablet at 11/08/24 0829    sodium chloride (PF) 0.9% PF flush 3 mL  3 mL Intracatheter Q8H Consuelo Sanchez PA-C   3 mL at 11/07/24 2208    sodium chloride (PF) 0.9% PF flush 3 mL  3 mL Intracatheter Q8H Consuelo Sanchez PA-C   3 mL at 11/08/24 1205                  Physical Exam:   Blood pressure (!) 141/81, pulse 83, temperature 98.2  F (36.8  C), temperature source Oral, resp. rate 16, height 1.88 m (6' 2.02\"), weight 118 kg (260 lb 2.3 oz), SpO2 97%.  Wt Readings from Last 2 Encounters:   11/04/24 118 kg (260 lb 2.3 oz)   08/04/23 121.8 kg (268 lb 9.6 oz)     Vital Signs " "with Ranges  Temp:  [98.2  F (36.8  C)-98.7  F (37.1  C)] 98.2  F (36.8  C)  Pulse:  [77-83] 83  Resp:  [16] 16  BP: (141-153)/(81-88) 141/81  SpO2:  [95 %-98 %] 97 %    Constitutional: Awake, alert, cooperative, no apparent distress     Lungs: Clear to auscultation bilaterally, no crackles or wheezing   Cardiovascular: Regular rate and rhythm, normal S1 and S2, and no murmur noted   Abdomen: Normal bowel sounds, soft, non-distended, non-tender   Skin: No rashes, no cyanosis, no edema   Other: Knee wrapped          Data:   All microbiology laboratory data reviewed.  Recent Labs   Lab Test 11/07/24  0550 11/06/24  0611 11/05/24  0544   WBC 8.4 12.6* 12.2*   HGB 14.0 14.1 14.1   HCT 38.7* 39.5* 38.8*   MCV 80 80 80    241 225     Recent Labs   Lab Test 11/08/24  0658 11/07/24  0550 11/06/24  0611   CR 0.74 0.72 0.75     Recent Labs   Lab Test 08/04/23  1045   SED 4     No lab results found.    Invalid input(s): \"UC\"     "

## 2024-11-09 LAB
ANION GAP SERPL CALCULATED.3IONS-SCNC: 11 MMOL/L (ref 7–15)
BACTERIA BLD CULT: NO GROWTH
BACTERIA BLD CULT: NO GROWTH
BACTERIA SNV CULT: NO GROWTH
BUN SERPL-MCNC: 12.9 MG/DL (ref 6–20)
CALCIUM SERPL-MCNC: 8.9 MG/DL (ref 8.8–10.4)
CHLORIDE SERPL-SCNC: 96 MMOL/L (ref 98–107)
CREAT SERPL-MCNC: 0.71 MG/DL (ref 0.67–1.17)
EGFRCR SERPLBLD CKD-EPI 2021: >90 ML/MIN/1.73M2
GLUCOSE BLDC GLUCOMTR-MCNC: 159 MG/DL (ref 70–99)
GLUCOSE BLDC GLUCOMTR-MCNC: 303 MG/DL (ref 70–99)
GLUCOSE BLDC GLUCOMTR-MCNC: 305 MG/DL (ref 70–99)
GLUCOSE BLDC GLUCOMTR-MCNC: 320 MG/DL (ref 70–99)
GLUCOSE BLDC GLUCOMTR-MCNC: 368 MG/DL (ref 70–99)
GLUCOSE SERPL-MCNC: 313 MG/DL (ref 70–99)
GRAM STAIN RESULT: NORMAL
GRAM STAIN RESULT: NORMAL
HCO3 SERPL-SCNC: 24 MMOL/L (ref 22–29)
POTASSIUM SERPL-SCNC: 4 MMOL/L (ref 3.4–5.3)
SODIUM SERPL-SCNC: 131 MMOL/L (ref 135–145)

## 2024-11-09 PROCEDURE — 250N000013 HC RX MED GY IP 250 OP 250 PS 637

## 2024-11-09 PROCEDURE — 250N000011 HC RX IP 250 OP 636

## 2024-11-09 PROCEDURE — 250N000013 HC RX MED GY IP 250 OP 250 PS 637: Performed by: INTERNAL MEDICINE

## 2024-11-09 PROCEDURE — 250N000013 HC RX MED GY IP 250 OP 250 PS 637: Performed by: STUDENT IN AN ORGANIZED HEALTH CARE EDUCATION/TRAINING PROGRAM

## 2024-11-09 PROCEDURE — 82947 ASSAY GLUCOSE BLOOD QUANT: CPT | Performed by: INTERNAL MEDICINE

## 2024-11-09 PROCEDURE — 99233 SBSQ HOSP IP/OBS HIGH 50: CPT | Performed by: INTERNAL MEDICINE

## 2024-11-09 PROCEDURE — 120N000001 HC R&B MED SURG/OB

## 2024-11-09 PROCEDURE — 36415 COLL VENOUS BLD VENIPUNCTURE: CPT | Performed by: INTERNAL MEDICINE

## 2024-11-09 PROCEDURE — 80048 BASIC METABOLIC PNL TOTAL CA: CPT | Performed by: INTERNAL MEDICINE

## 2024-11-09 PROCEDURE — 99231 SBSQ HOSP IP/OBS SF/LOW 25: CPT | Performed by: INTERNAL MEDICINE

## 2024-11-09 RX ORDER — GLIPIZIDE 10 MG/1
10 TABLET, FILM COATED, EXTENDED RELEASE ORAL
Qty: 30 TABLET | Refills: 0 | DISCHARGE
Start: 2024-11-09

## 2024-11-09 RX ADMIN — FAMOTIDINE 20 MG: 20 TABLET, FILM COATED ORAL at 20:09

## 2024-11-09 RX ADMIN — ACETAMINOPHEN 650 MG: 325 TABLET, FILM COATED ORAL at 09:09

## 2024-11-09 RX ADMIN — HYDROMORPHONE HYDROCHLORIDE 0.4 MG: 0.2 INJECTION, SOLUTION INTRAMUSCULAR; INTRAVENOUS; SUBCUTANEOUS at 04:59

## 2024-11-09 RX ADMIN — METFORMIN ER 500 MG 500 MG: 500 TABLET ORAL at 08:55

## 2024-11-09 RX ADMIN — FAMOTIDINE 20 MG: 20 TABLET, FILM COATED ORAL at 08:56

## 2024-11-09 RX ADMIN — METHOCARBAMOL 750 MG: 750 TABLET ORAL at 21:15

## 2024-11-09 RX ADMIN — OXYCODONE HYDROCHLORIDE 5 MG: 5 TABLET ORAL at 21:15

## 2024-11-09 RX ADMIN — HYDROMORPHONE HYDROCHLORIDE 0.4 MG: 0.2 INJECTION, SOLUTION INTRAMUSCULAR; INTRAVENOUS; SUBCUTANEOUS at 23:11

## 2024-11-09 RX ADMIN — Medication 1 TABLET: at 08:55

## 2024-11-09 RX ADMIN — LISINOPRIL 40 MG: 40 TABLET ORAL at 08:58

## 2024-11-09 RX ADMIN — CEFTRIAXONE 2 G: 2 INJECTION, POWDER, FOR SOLUTION INTRAMUSCULAR; INTRAVENOUS at 21:15

## 2024-11-09 RX ADMIN — ASPIRIN 81 MG: 81 TABLET, COATED ORAL at 08:56

## 2024-11-09 RX ADMIN — OXYCODONE HYDROCHLORIDE 5 MG: 5 TABLET ORAL at 02:04

## 2024-11-09 RX ADMIN — GLIPIZIDE 10 MG: 10 TABLET, EXTENDED RELEASE ORAL at 09:11

## 2024-11-09 RX ADMIN — METHOCARBAMOL 750 MG: 750 TABLET ORAL at 02:04

## 2024-11-09 RX ADMIN — OXYCODONE HYDROCHLORIDE 5 MG: 5 TABLET ORAL at 09:10

## 2024-11-09 RX ADMIN — METFORMIN ER 500 MG 500 MG: 500 TABLET ORAL at 17:34

## 2024-11-09 RX ADMIN — ASPIRIN 81 MG: 81 TABLET, COATED ORAL at 20:10

## 2024-11-09 RX ADMIN — HYDROCHLOROTHIAZIDE 25 MG: 25 TABLET ORAL at 08:57

## 2024-11-09 NOTE — PLAN OF CARE
"Goal Outcome Evaluation:      Plan of Care Reviewed With: patient    Overall Patient Progress: improvingOverall Patient Progress: improving    Outcome Evaluation: Pain management, Oxycodone and robaxin given x2 and IV dilaudid given x 1. Denies nausea and SOB.  End of Shift Summary  For vital signs and complete assessments, please see documentation flowsheets.     Pertinent assessments: Assumed nikki @ [id number]. Pt up Ax1 with walker & belt. A&O. VSS on RA. R leg in ace wrap in place. C/o R knee pain/cramping, PRN oxycodone and robaxin. PRN IV dilaudid given x1 for pain 9/10. Milka diet, appetite improving, BG monitoring, 290 and 320.  denies nausea. No BM on this shift. Voiding w/o any issues. PIV SL.     Major Shift Events: uneventful     Treatment Plan: pain management. IV rocephin. PT/OT. ID following.     Bedside Nurse:Giana Hamilton RN    Problem: Adult Inpatient Plan of Care  Goal: Plan of Care Review  Description: The Plan of Care Review/Shift note should be completed every shift.  The Outcome Evaluation is a brief statement about your assessment that the patient is improving, declining, or no change.  This information will be displayed automatically on your shift  note.  11/9/2024 0611 by Giana Hamilton RN  Outcome: Progressing  Flowsheets (Taken 11/9/2024 0611)  Plan of Care Reviewed With: patient  Overall Patient Progress: improving  11/9/2024 0605 by Giana Hamilton RN  Outcome: Not Progressing  Flowsheets (Taken 11/9/2024 0605)  Outcome Evaluation: Pain management, Oxycodone and robaxin given x2 and IV dilaudid given x 1. Denies nausea and SOB.  Plan of Care Reviewed With: patient  Overall Patient Progress: improving  Goal: Patient-Specific Goal (Individualized)  Description: You can add care plan individualizations to a care plan. Examples of Individualization might be:  \"Parent requests to be called daily at 9am for status\", \"I have a hard time hearing out of my right ear\", or \"Do not touch me " "to wake me up as it startles  me\".  11/9/2024 0611 by Giana Hamilton RN  Outcome: Progressing  11/9/2024 0605 by Giana Hamilton RN  Outcome: Not Progressing  Goal: Absence of Hospital-Acquired Illness or Injury  11/9/2024 0611 by Giana Hamilton RN  Outcome: Progressing  11/9/2024 0605 by Giana Hamilton RN  Outcome: Not Progressing  Intervention: Identify and Manage Fall Risk  Recent Flowsheet Documentation  Taken 11/9/2024 0045 by Giana Hamilton RN  Safety Promotion/Fall Prevention:   activity supervised   nonskid shoes/slippers when out of bed  Intervention: Prevent Skin Injury  Recent Flowsheet Documentation  Taken 11/9/2024 0045 by Giana Hamilton RN  Body Position: position changed independently  Taken 11/8/2024 1936 by Giana Hamilton RN  Body Position: position changed independently  Intervention: Prevent and Manage VTE (Venous Thromboembolism) Risk  Recent Flowsheet Documentation  Taken 11/9/2024 0045 by Giana Hamilton RN  VTE Prevention/Management: SCDs off (sequential compression devices)  Intervention: Prevent Infection  Recent Flowsheet Documentation  Taken 11/9/2024 0045 by Giana Hamilton RN  Infection Prevention:   cohorting utilized   hand hygiene promoted   rest/sleep promoted   single patient room provided   personal protective equipment utilized  Goal: Optimal Comfort and Wellbeing  11/9/2024 0611 by Giana Hamilton RN  Outcome: Progressing  11/9/2024 0605 by Giana Hamilton RN  Outcome: Not Progressing  Intervention: Monitor Pain and Promote Comfort  Recent Flowsheet Documentation  Taken 11/8/2024 1936 by Giana Hamilton RN  Pain Management Interventions: medication (see MAR)  Goal: Readiness for Transition of Care  11/9/2024 0611 by Giana Hamilton RN  Outcome: Progressing  11/9/2024 0605 by Giana Hamilton RN  Outcome: Not Progressing     Problem: Pain Acute  Goal: Optimal Pain Control and Function  11/9/2024 0611 by Giana Hamilton RN  Outcome: " Progressing  11/9/2024 0605 by Giana Hamilton RN  Outcome: Not Progressing  Intervention: Develop Pain Management Plan  Recent Flowsheet Documentation  Taken 11/8/2024 1936 by Giana Hamilton RN  Pain Management Interventions: medication (see MAR)  Intervention: Prevent or Manage Pain  Recent Flowsheet Documentation  Taken 11/9/2024 0045 by Giana Hamilton RN  Sleep/Rest Enhancement:   awakenings minimized   consistent schedule promoted   noise level reduced   regular sleep/rest pattern promoted   room darkened  Medication Review/Management: medications reviewed     Problem: Comorbidity Management  Goal: Blood Glucose Levels Within Targeted Range  11/9/2024 0611 by Giana Hamilton RN  Outcome: Progressing  11/9/2024 0605 by Giana Hamilton RN  Outcome: Not Progressing  Intervention: Monitor and Manage Glycemia  Recent Flowsheet Documentation  Taken 11/9/2024 0045 by Giana Hamilton RN  Medication Review/Management: medications reviewed     Problem: Skin Injury Risk Increased  Goal: Skin Health and Integrity  11/9/2024 0611 by Giana Hamilton RN  Outcome: Progressing  11/9/2024 0605 by Giana Hamilton RN  Outcome: Not Progressing  Intervention: Plan: Nurse Driven Intervention: Moisture Management  Recent Flowsheet Documentation  Taken 11/9/2024 0045 by Giana Hamilton RN  Moisture Interventions:   Encourage regular toileting   No brief in bed   Incontinence pad  Bathing/Skin Care: incontinence care  Taken 11/8/2024 2000 by Giana Hamilton RN  Moisture Interventions:   Encourage regular toileting   No brief in bed   Incontinence pad  Bathing/Skin Care: incontinence care  Intervention: Plan: Nurse Driven Intervention: Friction and Shear  Recent Flowsheet Documentation  Taken 11/9/2024 0045 by Giana Hamilton RN  Friction/Shear Interventions: HOB 30 degrees or less  Intervention: Optimize Skin Protection  Recent Flowsheet Documentation  Taken 11/9/2024 0045 by Giana Hamilton RN  Activity  Management: activity adjusted per tolerance  Head of Bed (HOB) Positioning: HOB at 15 degrees  Taken 11/8/2024 1936 by Giana Hamilton, RN  Activity Management: activity adjusted per tolerance  Head of Bed (HOB) Positioning: HOB at 15 degrees

## 2024-11-09 NOTE — PLAN OF CARE
Goal Outcome Evaluation:      Plan of Care Reviewed With: patient    Overall Patient Progress: improvingOverall Patient Progress: improving    Outcome Evaluation: Pain management, Oxycodone and robaxin given x2 and IV dilaudid given x 1. Denies nausea and SOB.  End of Shift Summary  For vital signs and complete assessments, please see documentation flowsheets.     Pertinent assessments: Assumed nikki @ 7543-9442. Pt up Ax1 with walker & belt. A&O. VSS on RA. R leg in ace wrap in place. C/o R knee pain/cramping, PRN oxycodone and robaxin. PRN IV dilaudid given x1 for pain 9/10. Milka diet, appetite improving, BG monitoring, 290 and 320.  denies nausea. No BM on this shift. Voiding w/o any issues. PIV SL.     Major Shift Events: uneventful     Treatment Plan: pain management. IV rocephin. PT/OT. ID following.     Bedside Nurse:Giana Hamilton RN

## 2024-11-09 NOTE — PROGRESS NOTES
Care Management Follow Up    Length of Stay (days): 5    Expected Discharge Date: 11/10/2024     Concerns to be Addressed: care coordination/care conferences     Patient plan of care discussed at interdisciplinary rounds: Yes    Anticipated Discharge Disposition: Skilled Nursing Facility, Transitional Care              Anticipated Discharge Services: None  Anticipated Discharge DME: None    Patient/family educated on Medicare website which has current facility and service quality ratings: yes  Education Provided on the Discharge Plan:    Patient/Family in Agreement with the Plan: yes    Referrals Placed by CM/SW: Post Acute Facilities  Private pay costs discussed: Not applicable    Discussed  Partnership in Safe Discharge Planning  document with patient/family: No     Handoff Completed: No, handoff not indicated or clinically appropriate    Additional Information:  Patient's planned discharge to TCU ha hoa delayed due to elevated blood sugars. Possible discharge tomorrow. CM called Ryan Carvajal to inform. CM called mother, but patient had already called her.    Next Steps: Monitor for medical readiness for tomorrow.    Delfina Tabor, RN, BSN, CM  Inpatient Care Coordination  [facility] [place]  [phone]

## 2024-11-09 NOTE — PROGRESS NOTES
[facility]    Medicine Progress Note - Hospitalist Service    Date of Admission:  11/4/2024    Assessment & Plan   Scout Perez is a 54 year old male with PMHx significant for HTN, DM type II, polyneuropathy and GERD, who was admitted on 11/4/2024 with concerns for R knee septic arthritis.     R knee septic arthritis  Recent dental procedure/possible infection, reported fevers in October:  Postoperative state status post right knee arthrotomy and irrigation debridement last November 5, 2024  Sudden onset of R knee pain, swelling and erythema. No joint hardware, notes recent dental infection. Hx of DM, HgbA1c 8.7 ten days ago.  R knee aspirated in ED, 97,500 nucleated cells, culture and gram stain pending.  Delay in gram stain results so in order to not delay treatment, he was given IV Rocephin and Vanco in ED.     - Concern over possible seeding.   Patient with vague dental issues since September.  He had teeth extraction.  Reports not on antibiotics at that time pre/post dental procedure.  Had some intermittent flushed/fever episodes in October with temp 101 by his report.  Dental issues had somewhat improved but then right knee issue developed as above.      -No significant leukocytosis (last CBC 11/7/24)  No fevers  -Cultures remain no growth to date     - has been on IV vanco and IV rocephin - switched to IV rocephin alone on 11/7/24 per infectious disease   No crystals seen on exam of fluid.    -Highly appreciate extensive and prompt input from operative orthopedics service and infectious disease    Lyme disease workup has been negative.    Continue Aspirin for DVT prophylaxis twice daily dosing as per Ortho recommendations     Per ID - orders placed for MIDLINE and 3-week total course of IV rocephin  -- Plans for TCU discharge.  Will hold discharge planning today given uncontrolled glucose levels.  -Also waiting for midline insertion                 DM type II, poor control increasing  "infection risk  Polyneuropathy:    Glucose 421 in ED, last HgbA1c 8.7 on 10/23/24. Per chart review, pt has low health literacy. He has been on metformin and glipizide, Jardiance was started ~10 days ago. Per pt, he stopped this due to some issues with reflux, unclear.  -Now with uncontrolled glucose levels.  He thinks his oral intake might improve later on.  He mentioned that he has not eaten much at least in the past several weeks.    Glucose level still is more than 300 mg/dL and average  Now on prandial insulin coverage.  Increase Lantus.  Restarted metformin.  Restarted lower dose of sulfonylurea     HTN:  Managed with Lisinopril and hydrochlorothiazide.  -Resume home regimen    SBP still up and down (140-150's)  Continue pain control and close clinical monitoring       GERD:  Recently started on Famotidine by PCP, did not tolerate PPI  - famotidine to continue               Diet: Snacks/Supplements Adult: Glucerna; Between Meals  Moderate Consistent Carb (60 g CHO per Meal) Diet    DVT Prophylaxis: Ambulate every shift and currently receiving aspirin 81 mg twice daily as per orthopedic service  Choudhury Catheter: Not present  Lines: None     Cardiac Monitoring: None  Code Status: Full Code      Clinically Significant Risk Factors         # Hyponatremia: Lowest Na = 131 mmol/L in last 2 days, will monitor as appropriate  # Hypochloremia: Lowest Cl = 96 mmol/L in last 2 days, will monitor as appropriate          # Hypertension: Noted on problem list            # Obesity: Estimated body mass index is 33.39 kg/m  as calculated from the following:    Height as of this encounter: 1.88 m (6' 2.02\").    Weight as of this encounter: 118 kg (260 lb 2.3 oz).             Disposition Plan     Medically Ready for Discharge: Anticipated Tomorrow  Will need IV access for IV antibiotics upon discharge and further optimization of glucose levels             Rigoberto Barnett MD, MD  Hospitalist Service  [facility] " Hospital  Securely message with Clint (more info)  Text page via [facility] Paging/Directory   ______________________________________________________________________    Interval History   I resumed medicine service care today.  Seen and examined.  Chart reviewed.  Case discussed with nursing service.  I met Cricket this morning while he is laying comfortably in bed.  Appears to be in good spirits as he is endorsing no uncontrolled or worsening leg pain.  Able to tolerate oral diet with no nausea or vomiting.  He mentioned that he had a satisfying bowel movement yesterday.  Voiding freely.  Not requiring oxygen support.  Remained afebrile.  Remained very pleasant and conversational.    Physical Exam   Vital Signs: Temp: 97.8  F (36.6  C) Temp src: Oral BP: 116/79 Pulse: 78   Resp: 18 SpO2: 98 % O2 Device: None (Room air)    Weight: 260 lbs 2.28 oz    HEENT; Atraumatic, normocephalic, pinkish conjuctiva, pupils bilateral reactive   Skin: warm and moist, no rashes  Lungs: equal chest expansion, clear to auscultation, no wheezes, no stridor, no crackles,   Heart: normal rate, normal rhythm, no rubs or gallops.   Abdomen: normal bowel sounds, no tenderness, no peritoneal signs, no guarding  Extremities: no deformities, no edema   Ace wrap bandage right lower extremity, can wiggle toes, no swelling at foot area  Neuro; follow commands, alert and oriented x3, spontaneous speech, coherent, moves all extremities spontaneously  Psych; no hallucination, euthymic mood, not agitated      Medical Decision Making       50 MINUTES SPENT BY ME on the date of service doing chart review, history, exam, documentation & further activities per the note.  MANAGEMENT DISCUSSED with the following over the past 24 hours: Yes   NOTE(S)/MEDICAL RECORDS REVIEWED over the past 24 hours: Yes       Data     I have personally reviewed the following data over the past 24 hrs:    N/A  \   N/A   / N/A     131 (L) 96 (L) 12.9 /  303 (H)   4.0 24 0.71 \        Imaging results reviewed over the past 24 hrs:   No results found for this or any previous visit (from the past 24 hours).

## 2024-11-09 NOTE — PROGRESS NOTES
[facility]  Infectious Disease Progress Note          Assessment and Plan:   Date of Admission:  11/4/2024  Date of Consult (When I saw the patient): 11/05/24        Assessment & Plan  Scout Perez is a 54 year old male who was admitted on 11/4/2024.      Impression: 1 54-year-old male, acute new right knee pain, tap consistent with infection, status post operative I&D, cultures all pending  2 vague low-grade fever and systemic symptoms for several weeks before knee pain, question bacteremic or other underlying disease, prominent the differential diagnosis here is Lyme disease  3 diabetes mellitus  4 right maxillary tooth extraction preceding symptoms without obvious infection or antibiotics     REC 1 knee stable, cultures all remain negative, statistically the most likely thing here is that this is not infection and noninfectious inflammatory knee process, but overall picture suggested infection.  Given negative culture very unlikely MRSA so we will stop Vanco, will do a 3-week course of IV ceftriaxone which will cover the majority of likely pathogens, then if knee is not improving or more joint symptoms develop reconsider options  2 Lyme disease a consideration given his exposure but negative Lyme serology with Lyme arthritis mostly excludes this, of note if that is the diagnosis ceftriaxone course will also be curative  Midline and IV orders in, okay disposition when all arranged, will follow labs and clinical picture as an outpatient if worsens or new joint symptoms reconsider diagnosis and approach  Culture still negative at 4 days looks like were not going to grow the organism         Interval History:     no new complaints no systemic symptoms, knee pain still about the same, cultures all negative so far crystals negative,               Medications:     Current Facility-Administered Medications   Medication Dose Route Frequency Provider Last Rate Last Admin    aspirin EC  "tablet 81 mg  81 mg Oral BID Consuelo Sanchez PA-C   81 mg at 11/09/24 0856    cefTRIAXone (ROCEPHIN) 2 g vial to attach to  ml bag for ADULTS or NS 50 ml bag for PEDS  2 g Intravenous Q24H Consuelo Sanchez PA-C   2 g at 11/08/24 2102    famotidine (PEPCID) tablet 20 mg  20 mg Oral BID Consuelo Sanchez PA-C   20 mg at 11/09/24 0856    glipiZIDE (GLUCOTROL XL) 24 hr tablet 10 mg  10 mg Oral Daily with breakfast Courtney Pablo, DO   10 mg at 11/09/24 0911    hydrochlorothiazide (HYDRODIURIL) tablet 25 mg  25 mg Oral Daily Rigoberto Barnett MD   25 mg at 11/09/24 0857    insulin aspart (NovoLOG) injection (RAPID ACTING)   Subcutaneous TID w/meals Rigoberto Barnett MD   4 Units at 11/09/24 1249    insulin aspart (NovoLOG) injection (RAPID ACTING)  1-7 Units Subcutaneous TID AC Rigoberto Barnett MD   4 Units at 11/09/24 1249    insulin aspart (NovoLOG) injection (RAPID ACTING)  1-5 Units Subcutaneous At Bedtime Rigoberto Barnett MD   2 Units at 11/08/24 2131    insulin glargine (LANTUS PEN) injection 20 Units  20 Units Subcutaneous QAM AC Rigoberto Barnett MD   20 Units at 11/09/24 0904    lisinopril (ZESTRIL) tablet 40 mg  40 mg Oral Daily Rigoberto Barnett MD   40 mg at 11/09/24 0858    metFORMIN (GLUCOPHAGE XR) 24 hr tablet 500 mg  500 mg Oral BID w/meals Courtney Pablo, DO   500 mg at 11/09/24 0855    multivitamin w/minerals (THERA-VIT-M) tablet 1 tablet  1 tablet Oral Daily Rigoberto Barnett MD   1 tablet at 11/09/24 0855    sodium chloride (PF) 0.9% PF flush 3 mL  3 mL Intracatheter Q8H Consuelo Sanchez PA-C   3 mL at 11/09/24 0500    sodium chloride (PF) 0.9% PF flush 3 mL  3 mL Intracatheter Q8H Consuelo Sanchez PA-C   3 mL at 11/09/24 1250                  Physical Exam:   Blood pressure (!) 142/79, pulse 89, temperature 97.8  F (36.6  C), temperature source Oral, resp. rate 18, height 1.88 m (6' 2.02\"), weight 118 kg (260 lb 2.3 oz), SpO2 98%.  Wt Readings from Last 2 " "Encounters:   11/04/24 118 kg (260 lb 2.3 oz)   08/04/23 121.8 kg (268 lb 9.6 oz)     Vital Signs with Ranges  Temp:  [97.8  F (36.6  C)-98.7  F (37.1  C)] 97.8  F (36.6  C)  Pulse:  [76-92] 89  Resp:  [16-18] 18  BP: (116-142)/(65-79) 142/79  SpO2:  [96 %-98 %] 98 %    Constitutional: Awake, alert, cooperative, no apparent distress     Lungs: Clear to auscultation bilaterally, no crackles or wheezing   Cardiovascular: Regular rate and rhythm, normal S1 and S2, and no murmur noted   Abdomen: Normal bowel sounds, soft, non-distended, non-tender   Skin: No rashes, no cyanosis, no edema   Other: Knee wrapped          Data:   All microbiology laboratory data reviewed.  Recent Labs   Lab Test 11/07/24  0550 11/06/24  0611 11/05/24  0544   WBC 8.4 12.6* 12.2*   HGB 14.0 14.1 14.1   HCT 38.7* 39.5* 38.8*   MCV 80 80 80    241 225     Recent Labs   Lab Test 11/09/24  0647 11/08/24  0658 11/07/24  0550   CR 0.71 0.74 0.72     Recent Labs   Lab Test 08/04/23  1045   SED 4     No lab results found.    Invalid input(s): \"UC\"     "

## 2024-11-09 NOTE — PROGRESS NOTES
"Orthopedic Surgery  Scout Perez  2024  Admit Date:  2024  POD: 4 Days Post-Op  Procedure(s):  Right knee arthrotomy and irrigation debridement right knee for infection, deepest layer synovium    Patient resting comfortably in bed.    Patient reports slight increase in pain since his drain was pulled yesterday.   Tolerating oral intake.    Denies nausea or vomiting.  Denies chest pain or shortness of breath.  No events overnight.      Alert and orient to person, place, and time.  Vital Sign Ranges  Temperature Temp  Av.1  F (36.7  C)  Min: 97.8  F (36.6  C)  Max: 98.7  F (37.1  C)   Blood pressure Systolic (24hrs), Av , Min:116 , Max:124        Diastolic (24hrs), Av, Min:65, Max:79      Pulse Pulse  Av  Min: 76  Max: 92   Respirations Resp  Av.3  Min: 16  Max: 18   Pulse oximetry SpO2  Av %  Min: 96 %  Max: 98 %       Dressing is clean, dry, and intact.   Knee ROM 10 - 65.   Minimal erythema of the surrounding skin.   Bilateral calves are soft, non-tender.   Bilateral lower extremity is NVI.  Sensation intact bilateral lower extremities.  5/5 motor with resisted dorsi and plantar flexion bilaterally.  +DP pulse.     Labs:  Recent Labs   Lab Test 24  0647 24  0550 24  0611   POTASSIUM 4.0 3.8 3.7     Recent Labs   Lab Test 24  0550 24  0611 24  0544   HGB 14.0 14.1 14.1     No results for input(s): \"INR\" in the last 50717 hours.  Recent Labs   Lab Test 24  0550 24  0611 24  0544    241 225     Cultures showing NGTD.    1. PLAN:              ASA 81 mg BID for DVT prophylaxis.                Abx per ID:   Ceftrixone               Mobilize with PT/OT               WBAT RLE              Continue current pain regimen              Dressings: Keep intact.  Change if >60% saturated.              Follow-up: 2 weeks post-op with Dr. Guerra team     2. Disposition              Anticipate d/c to TCU today.     Sayra ORTA " JAYDEN Christina

## 2024-11-10 ENCOUNTER — HOME INFUSION BILLING (OUTPATIENT)
Dept: HOME HEALTH SERVICES | Facility: HOME HEALTH | Age: 54
End: 2024-11-10

## 2024-11-10 ENCOUNTER — LAB REQUISITION (OUTPATIENT)
Dept: LAB | Facility: CLINIC | Age: 54
End: 2024-11-10
Payer: COMMERCIAL

## 2024-11-10 VITALS
RESPIRATION RATE: 18 BRPM | TEMPERATURE: 97.7 F | HEART RATE: 88 BPM | BODY MASS INDEX: 33.39 KG/M2 | OXYGEN SATURATION: 96 % | DIASTOLIC BLOOD PRESSURE: 78 MMHG | HEIGHT: 74 IN | SYSTOLIC BLOOD PRESSURE: 134 MMHG | WEIGHT: 260.14 LBS

## 2024-11-10 DIAGNOSIS — Z11.1 ENCOUNTER FOR SCREENING FOR RESPIRATORY TUBERCULOSIS: ICD-10-CM

## 2024-11-10 LAB
BACTERIA TISS BX CULT: NO GROWTH
BACTERIA TISS BX CULT: NO GROWTH
CREAT SERPL-MCNC: 0.72 MG/DL (ref 0.67–1.17)
EGFRCR SERPLBLD CKD-EPI 2021: >90 ML/MIN/1.73M2
GLUCOSE BLDC GLUCOMTR-MCNC: 166 MG/DL (ref 70–99)
GLUCOSE BLDC GLUCOMTR-MCNC: 254 MG/DL (ref 70–99)
GLUCOSE BLDC GLUCOMTR-MCNC: 312 MG/DL (ref 70–99)

## 2024-11-10 PROCEDURE — 250N000013 HC RX MED GY IP 250 OP 250 PS 637: Performed by: INTERNAL MEDICINE

## 2024-11-10 PROCEDURE — 99239 HOSP IP/OBS DSCHRG MGMT >30: CPT | Performed by: INTERNAL MEDICINE

## 2024-11-10 PROCEDURE — 250N000013 HC RX MED GY IP 250 OP 250 PS 637

## 2024-11-10 PROCEDURE — 250N000011 HC RX IP 250 OP 636

## 2024-11-10 PROCEDURE — 36569 INSJ PICC 5 YR+ W/O IMAGING: CPT

## 2024-11-10 PROCEDURE — 36415 COLL VENOUS BLD VENIPUNCTURE: CPT

## 2024-11-10 PROCEDURE — 82565 ASSAY OF CREATININE: CPT

## 2024-11-10 PROCEDURE — 250N000013 HC RX MED GY IP 250 OP 250 PS 637: Performed by: STUDENT IN AN ORGANIZED HEALTH CARE EDUCATION/TRAINING PROGRAM

## 2024-11-10 PROCEDURE — 272N000748 HC KIT, CATH 3FR OR 4FR SINGLE LUMEN POWERMIDLINE

## 2024-11-10 RX ORDER — ASPIRIN 81 MG/1
81 TABLET ORAL 2 TIMES DAILY
DISCHARGE
Start: 2024-11-10

## 2024-11-10 RX ORDER — POLYETHYLENE GLYCOL 3350 17 G/17G
17 POWDER, FOR SOLUTION ORAL DAILY
DISCHARGE
Start: 2024-11-10

## 2024-11-10 RX ADMIN — Medication 1 TABLET: at 09:45

## 2024-11-10 RX ADMIN — HYDROMORPHONE HYDROCHLORIDE 0.4 MG: 0.2 INJECTION, SOLUTION INTRAMUSCULAR; INTRAVENOUS; SUBCUTANEOUS at 05:05

## 2024-11-10 RX ADMIN — METHOCARBAMOL 750 MG: 750 TABLET ORAL at 12:28

## 2024-11-10 RX ADMIN — HYDROCHLOROTHIAZIDE 25 MG: 25 TABLET ORAL at 09:45

## 2024-11-10 RX ADMIN — ACETAMINOPHEN 650 MG: 325 TABLET, FILM COATED ORAL at 09:45

## 2024-11-10 RX ADMIN — ASPIRIN 81 MG: 81 TABLET, COATED ORAL at 09:44

## 2024-11-10 RX ADMIN — METHOCARBAMOL 750 MG: 750 TABLET ORAL at 06:13

## 2024-11-10 RX ADMIN — OXYCODONE HYDROCHLORIDE 5 MG: 5 TABLET ORAL at 06:13

## 2024-11-10 RX ADMIN — LISINOPRIL 40 MG: 40 TABLET ORAL at 09:45

## 2024-11-10 RX ADMIN — METFORMIN ER 500 MG 500 MG: 500 TABLET ORAL at 09:45

## 2024-11-10 RX ADMIN — OXYCODONE HYDROCHLORIDE 5 MG: 5 TABLET ORAL at 12:28

## 2024-11-10 RX ADMIN — FAMOTIDINE 20 MG: 20 TABLET, FILM COATED ORAL at 09:45

## 2024-11-10 RX ADMIN — GLIPIZIDE 10 MG: 10 TABLET, EXTENDED RELEASE ORAL at 09:49

## 2024-11-10 ASSESSMENT — ACTIVITIES OF DAILY LIVING (ADL)
ADLS_ACUITY_SCORE: 0

## 2024-11-10 NOTE — PROCEDURES
[facility]    Single Lumen Midline Placement    Date/Time: 11/10/2024 9:43 AM    Performed by: Robert Haines RN  Authorized by: Silvino Love MD  Indications: vascular access      UNIVERSAL PROTOCOL   Site Marked: Yes  Prior Images Obtained and Reviewed:  Yes  Required items: Required blood products, implants, devices and special equipment available    Patient identity confirmed:  Verbally with patient, arm band and hospital-assigned identification number  NA - No sedation, light sedation, or local anesthesia  Confirmation Checklist:  Relevant allergies, patient's identity using two indicators, procedure was appropriate and matched the consent or emergent situation and correct equipment/implants were available  Time out: Immediately prior to the procedure a time out was called    Universal Protocol: the Joint Commission Universal Protocol was followed    Preparation: Patient was prepped and draped in usual sterile fashion       ANESTHESIA    Anesthesia:  Local infiltration  Local Anesthetic:  Lidocaine 1% without epinephrine  Anesthetic Total (mL):  1      SEDATION    Patient Sedated: No        Preparation: skin prepped with ChloraPrep  Skin prep agent: skin prep agent completely dried prior to procedure  Sterile barriers: maximum sterile barriers were used: cap, mask, sterile gown, sterile gloves, and large sterile sheet  Hand hygiene: hand hygiene performed prior to central venous catheter insertion  Type of line used: Midline  Catheter type: single lumen  Lumen type: non-valved  Catheter size: 4 Fr  Brand: Bard  Lot number: [id number]  Placement method: venipuncture, MST and ultrasound  Number of attempts: 1  Difficulty threading catheter: no  Successful placement: yes  Orientation: left  Catheter to Vein (%): 29  Location: basilic vein (1.33mm)  Arm circumference: adults 10 cm  Extremity circumference: 32  Visible catheter length: 2  Internal length: 14 cm  Total catheter length:  16  Dressing and securement: chlorhexidine disc applied, blood removed, alcohol impregnated caps, sterile dressing applied, subcutaneous anchor securement system and site cleansed  Post procedure assessment: blood return through all ports and free fluid flow  PROCEDURE   Patient Tolerance:  Patient tolerated the procedure well with no immediate complications     Midline ok to use, Danii Rey RN informed

## 2024-11-10 NOTE — PROGRESS NOTES
Occupational Therapy Discharge Summary    Reason for therapy discharge:    Discharged to transitional care facility.    Progress towards therapy goal(s). See goals on Care Plan in [facility] electronic health record for goal details.  Goals partially met.  Barriers to achieving goals:   discharge from facility.    Therapy recommendation(s):    Continued therapy is recommended.  Rationale/Recommendations:  to progress indp ADL.

## 2024-11-10 NOTE — PROGRESS NOTES
Physical Therapy Discharge Summary    Reason for therapy discharge:    Discharged to transitional care facility.    Progress towards therapy goal(s). See goals on Care Plan in [facility] electronic health record for goal details.  Goals not met.  Barriers to achieving goals:   discharge from facility.    Therapy recommendation(s):    Continued therapy is recommended.  Rationale/Recommendations:  Recommend discharge to TCU in order to increase strength, activity tolerance and independence with mobility..

## 2024-11-10 NOTE — PLAN OF CARE
"Pt up SBA with walker & belt. A&O. VSS R knee incision with staples, small amount of bleeding, redressed. Took Oxy & Tyl for pain. Ice packs applied. CMS intact. Milka diet, appetite good, denies nausea, BG monitoring, continue to be in the 300s. Voiding w/o issues. Denies SOB.   Goal Outcome Evaluation:      Plan of Care Reviewed With: patient    Overall Patient Progress: improvingOverall Patient Progress: improving       Problem: Adult Inpatient Plan of Care  Goal: Plan of Care Review  Description: The Plan of Care Review/Shift note should be completed every shift.  The Outcome Evaluation is a brief statement about your assessment that the patient is improving, declining, or no change.  This information will be displayed automatically on your shift  note.  Outcome: Progressing  Flowsheets (Taken 11/9/2024 1843)  Plan of Care Reviewed With: patient  Overall Patient Progress: improving  Goal: Patient-Specific Goal (Individualized)  Description: You can add care plan individualizations to a care plan. Examples of Individualization might be:  \"Parent requests to be called daily at 9am for status\", \"I have a hard time hearing out of my right ear\", or \"Do not touch me to wake me up as it startles  me\".  Outcome: Progressing  Goal: Absence of Hospital-Acquired Illness or Injury  Outcome: Progressing  Intervention: Identify and Manage Fall Risk  Recent Flowsheet Documentation  Taken 11/9/2024 0900 by Danii Rey RN  Safety Promotion/Fall Prevention:   activity supervised   nonskid shoes/slippers when out of bed  Intervention: Prevent Skin Injury  Recent Flowsheet Documentation  Taken 11/9/2024 0900 by Danii Rey RN  Body Position: position changed independently  Intervention: Prevent and Manage VTE (Venous Thromboembolism) Risk  Recent Flowsheet Documentation  Taken 11/9/2024 0900 by Danii Rey RN  VTE Prevention/Management: SCDs off (sequential compression devices)  Goal: Optimal Comfort and Wellbeing  Outcome: " Progressing  Goal: Readiness for Transition of Care  Outcome: Progressing

## 2024-11-10 NOTE — PROGRESS NOTES
Care Management Discharge Note    Discharge Date: 11/10/2024       Discharge Disposition: Skilled Nursing Facility, Transitional Care    Discharge Services: None    Discharge DME: None    Discharge Transportation: family or friend will provide    Private pay costs discussed: Not applicable    Does the patient's insurance plan have a 3 day qualifying hospital stay waiver?  No    PAS Confirmation Code: [id number]  Patient/family educated on Medicare website which has current facility and service quality ratings: yes    Education Provided on the Discharge Plan: no   Persons Notified of Discharge Plans: TCU, Charge RN, bedside RN, patient,NST  Patient/Family in Agreement with the Plan: yes    Handoff Referral Completed: No, handoff not indicated or clinically appropriate    Additional Information:  [facility] discharging [facility] today via mother transport at 2 pm. TCU notified. Orders were faxed. Charge RN, bedside RN, NST and patient all informed.    Delfina Tabor, RN, BSN, CM  Inpatient Care Coordination  [facility]  [phone]

## 2024-11-10 NOTE — PLAN OF CARE
"Goal Outcome Evaluation:      Plan of Care Reviewed With: patient    Overall Patient Progress: improvingOverall Patient Progress: improving    Outcome Evaluation: Pain management. BG inproved  End of Shift Summary  For vital signs and complete assessments, please see documentation flowsheets.     Pertinent assessments: Assumed cares @ [id number]. Pt up Ax1 with walker & belt. A&O. VSS on RA. R leg in ace wrap in place. Ice applied. C/o R knee pain/cramping, PRN oxycodone and robaxin given x2. PRN IV dilaudid given x1 for pain 9/10. Milka diet, appetite improving, BG monitoring, 159 and 166.  denies nausea. No BM on this shift. Voiding w/o any issues. PIV SL.     Major Shift Events: uneventful     Treatment Plan: pain management. IV rocephin. PT/OT. ID following.     Bedside Nurse:Giana Hamilton RN  Problem: Adult Inpatient Plan of Care  Goal: Plan of Care Review  Description: The Plan of Care Review/Shift note should be completed every shift.  The Outcome Evaluation is a brief statement about your assessment that the patient is improving, declining, or no change.  This information will be displayed automatically on your shift  note.  11/10/2024 0620 by Giana Hamilton RN  Outcome: Progressing  Flowsheets (Taken 11/10/2024 0620)  Plan of Care Reviewed With: patient  Overall Patient Progress: improving  11/10/2024 0620 by Giana Hamilton RN  Outcome: Progressing  Flowsheets  Taken 11/10/2024 0620  Plan of Care Reviewed With: patient  Overall Patient Progress: improving  Taken 11/10/2024 0619  Outcome Evaluation: Pain management. BG inproved  Plan of Care Reviewed With: patient  Overall Patient Progress: improving  Goal: Patient-Specific Goal (Individualized)  Description: You can add care plan individualizations to a care plan. Examples of Individualization might be:  \"Parent requests to be called daily at 9am for status\", \"I have a hard time hearing out of my right ear\", or \"Do not touch me to wake me up as it " "startles  me\".  11/10/2024 0620 by Giana Hamilton RN  Outcome: Progressing  11/10/2024 0620 by Giana Hamilton RN  Outcome: Progressing  Goal: Absence of Hospital-Acquired Illness or Injury  11/10/2024 0620 by Giana Hamilton RN  Outcome: Progressing  11/10/2024 0620 by Giana Hamilton RN  Outcome: Progressing  Intervention: Identify and Manage Fall Risk  Recent Flowsheet Documentation  Taken 11/9/2024 2121 by Giana Hamilton RN  Safety Promotion/Fall Prevention:   activity supervised   nonskid shoes/slippers when out of bed  Intervention: Prevent Skin Injury  Recent Flowsheet Documentation  Taken 11/9/2024 2121 by Giana Hamilton RN  Body Position: position changed independently  Taken 11/9/2024 2059 by Giana Hamilton RN  Body Position: position changed independently  Intervention: Prevent and Manage VTE (Venous Thromboembolism) Risk  Recent Flowsheet Documentation  Taken 11/9/2024 2121 by Giana Hamilton RN  VTE Prevention/Management: SCDs off (sequential compression devices)  Intervention: Prevent Infection  Recent Flowsheet Documentation  Taken 11/9/2024 2121 by Giana Hamilton RN  Infection Prevention:   cohorting utilized   hand hygiene promoted   rest/sleep promoted   single patient room provided   personal protective equipment utilized  Goal: Optimal Comfort and Wellbeing  11/10/2024 0620 by Giana Hamilton RN  Outcome: Progressing  11/10/2024 0620 by Giana Hamilton RN  Outcome: Progressing  Intervention: Monitor Pain and Promote Comfort  Recent Flowsheet Documentation  Taken 11/9/2024 2059 by Giana Hamilton RN  Pain Management Interventions: medication (see MAR)  Goal: Readiness for Transition of Care  11/10/2024 0620 by Giana Hamilton RN  Outcome: Progressing  11/10/2024 0620 by Giana Hamilton RN  Outcome: Progressing     Problem: Pain Acute  Goal: Optimal Pain Control and Function  11/10/2024 0620 by Giana Hamilton RN  Outcome: Progressing  11/10/2024 0620 by Joy" Giana GUZMÁN RN  Outcome: Progressing  Intervention: Develop Pain Management Plan  Recent Flowsheet Documentation  Taken 11/9/2024 2059 by Giana Hamilton RN  Pain Management Interventions: medication (see MAR)  Intervention: Prevent or Manage Pain  Recent Flowsheet Documentation  Taken 11/9/2024 2121 by Giana Hamilton RN  Sleep/Rest Enhancement:   awakenings minimized   consistent schedule promoted   noise level reduced   regular sleep/rest pattern promoted   room darkened  Medication Review/Management: medications reviewed     Problem: Comorbidity Management  Goal: Blood Glucose Levels Within Targeted Range  11/10/2024 0620 by Giana Hamilton RN  Outcome: Progressing  11/10/2024 0620 by Giana Hamilton RN  Outcome: Progressing  Intervention: Monitor and Manage Glycemia  Recent Flowsheet Documentation  Taken 11/9/2024 2121 by Giana Hamilton RN  Medication Review/Management: medications reviewed     Problem: Skin Injury Risk Increased  Goal: Skin Health and Integrity  11/10/2024 0620 by Giana Hamilton RN  Outcome: Progressing  11/10/2024 0620 by Giana Hamilton RN  Outcome: Progressing  Intervention: Plan: Nurse Driven Intervention: Moisture Management  Recent Flowsheet Documentation  Taken 11/9/2024 2121 by Giana Hamilton RN  Moisture Interventions:   Encourage regular toileting   No brief in bed   Incontinence pad  Bathing/Skin Care: incontinence care  Taken 11/9/2024 2000 by Giana Hamilton RN  Moisture Interventions:   Encourage regular toileting   No brief in bed   Incontinence pad  Bathing/Skin Care: incontinence care  Intervention: Plan: Nurse Driven Intervention: Friction and Shear  Recent Flowsheet Documentation  Taken 11/9/2024 2121 by Giana Hamilton RN  Friction/Shear Interventions: HOB 30 degrees or less  Intervention: Optimize Skin Protection  Recent Flowsheet Documentation  Taken 11/9/2024 2121 by Giana Hamilton RN  Activity Management: activity adjusted per tolerance  Head of  Bed (HOB) Positioning: HOB at 15 degrees  Taken 11/9/2024 2059 by Giana Hamilton, RN  Activity Management: activity adjusted per tolerance  Head of Bed (HOB) Positioning: HOB at 15 degrees

## 2024-11-10 NOTE — PROGRESS NOTES
Pt d/c'd to facility. Discharge instructions given & verbalized understanding. Discharge meds sent with pt.

## 2024-11-10 NOTE — PROGRESS NOTES
"Orthopedic Surgery  Scout Perez  11/10/2024  Admit Date:  2024  POD: 5 Days Post-Op  Procedure(s):  Right knee arthrotomy and irrigation debridement right knee for infection, deepest layer synovium    Patient resting comfortably in bed.    Patient states he continues to have pain in the knee but it is tolerable.   Tolerating oral intake.    Denies nausea or vomiting.  Denies chest pain or shortness of breath.  No events overnight.      Alert and orient to person, place, and time.  Vital Sign Ranges  Temperature Temp  Av.8  F (36.6  C)  Min: 97.6  F (36.4  C)  Max: 98.2  F (36.8  C)   Blood pressure Systolic (24hrs), Av , Min:134 , Max:142        Diastolic (24hrs), Av, Min:77, Max:79      Pulse Pulse  Av  Min: 82  Max: 89   Respirations Resp  Av  Min: 18  Max: 18   Pulse oximetry SpO2  Av.3 %  Min: 96 %  Max: 97 %       Dressing is clean, dry, and intact.   Knee ROM 10 - 80.   Minimal erythema of the surrounding skin.   Bilateral calves are soft, non-tender.   Bilateral lower extremity is NVI.  Sensation intact bilateral lower extremities.  5/5 motor with resisted dorsi and plantar flexion bilaterally.  +DP pulse.     Labs:  Recent Labs   Lab Test 24  0647 24  0550 24  0611   POTASSIUM 4.0 3.8 3.7     Recent Labs   Lab Test 24  0550 24  0611 24  0544   HGB 14.0 14.1 14.1     No results for input(s): \"INR\" in the last 61583 hours.  Recent Labs   Lab Test 24  0550 24  0611 24  0544    241 225     Cultures showing NGTD.     1. PLAN:              ASA 81 mg BID for DVT prophylaxis.                Abx per ID:   Ceftrixone               Mobilize with PT/OT               WBAT RLE              Continue current pain regimen              Dressings: Keep intact.  Change if >60% saturated.              Follow-up: 2 weeks post-op with Dr. Guerra team     2. Disposition              Anticipate d/c to TCU today.     Sayra ORTA " JAYDEN Christina

## 2024-11-10 NOTE — DISCHARGE SUMMARY
"[facility]  Hospitalist Discharge Summary      Date of Admission:  11/4/2024  Date of Discharge:  11/10/2024  Discharging Provider: Rigoberto Barnett MD, MD  Discharge Service: Hospitalist Service    Discharge Diagnoses   Right knee pain  High suspicion for right knee septic arthritis  Postoperative state status post right knee arthrotomy and irrigation debridement last November 5, 2024  Physical deconditioning  Uncontrolled diabetes mellitus currently requiring insulin  History of polyneuropathy  Benign essential hypertension  GERD    Clinically Significant Risk Factors     # Obesity: Estimated body mass index is 33.39 kg/m  as calculated from the following:    Height as of this encounter: 1.88 m (6' 2.02\").    Weight as of this encounter: 118 kg (260 lb 2.3 oz).       Follow-ups Needed After Discharge   Follow-up Appointments       Follow Up and recommended labs and tests      Follow up with Nursing home physician.  Continue to monitor glucose levels  Follow-up with orthopedics as scheduled  Follow-up with infectious disease service as scheduled  He will need to follow-up with his PCP after discharge from transitional care unit and will need podiatry, ophthalmology for routine diabetes care as well.        Follow-up and recommended labs and tests       Please call as soon as possible to make an appointment to be seen in Dr. Steve Guerra's clinic at 2 weeks postop for a recheck of your surgical site, possible repeat x-rays, and wound care. If you are at a TCU at this time and they have x-ray capabilities, you may complete your wound care and x-rays at your TCU and have them send your images to Dr. Guerra's office.     Dr. Guerra's care coordinator is Sarah. Please contact her at [phone] to schedule an appointment. Dr. Guerra's care team's fax number is [phone].     Dr. Guerra sees patients at 3 clinic locations:  [facility] - [address], " [place] [address]  [facility]  [address], [place] [address]  [facility]  [address], [place] [address]      Please call the on-call phone number [phone] during evenings, nights and weekends for any urgent needs. Prescription refills must be done during business hours by contacting your surgical team.              Follow-up with PCP and ID service as scheduled  Unresulted Labs Ordered in the Past 30 Days of this Admission       Date and Time Order Name Status Description    11/5/2024 11:55 AM Anaerobic Bacterial Culture Routine Preliminary     11/5/2024 11:55 AM Anaerobic Bacterial Culture Routine Preliminary         These results will be followed up by TCU team and PCP    Discharge Disposition   Discharged to rehabilitation facility  Condition at discharge: Stable    Hospital Course     Cricket is a 54-year-old independent living gentleman with background history of non-insulin-requiring diabetes mellitus, hypertension, polyneuropathy and GERD who presented due to increasing pain on right lower extremity and decreasing range of motion on the right knee with high suspicion for underlying right knee septic arthritis.  He underwent right knee arthrotomy and irrigation debridement last November 5, 2024 of which she tolerated the procedure well.  He has been on IV antibiotics coverage and optimized from ID perspective with decisions to continue further IV antibiotics upon discharge with ceftriaxone via midline.  Currently no growth with culture sent earlier.  Endorsing no fevers, chills, nausea, vomiting nor diarrhea.  Participating with therapy services.  He is demonstrating increasing oral intake with no issues with chest pain or shortness of breath or aspiration like symptoms.  Currently stable hemodynamics with no fever spikes.  Found with uncontrolled glucose levels.  Initiated on Lantus to be continued upon discharge and resumption  of patient's metformin but decrease dose of sulfonylurea due to initiation of basal Lantus.  Further optimization and adjustment based on glucose levels as patient is also being discharged going to transitional care unit setting is physical deconditioned state and will need therapy.          Scout Perez is a 54 year old male with PMHx significant for HTN, DM type II, polyneuropathy and GERD, who was admitted on 11/4/2024 with concerns for R knee septic arthritis.     R knee septic arthritis  Recent dental procedure/possible infection, reported fevers in October:  Postoperative state status post right knee arthrotomy and irrigation debridement last November 5, 2024  Sudden onset of R knee pain, swelling and erythema. No joint hardware, notes recent dental infection. Hx of DM, HgbA1c 8.7 ten days ago.  R knee aspirated in ED, 97,500 nucleated cells, culture and gram stain pending.  Delay in gram stain results so in order to not delay treatment, he was given IV Rocephin and Vanco in ED.     - Concern over possible seeding.   Patient with vague dental issues since September.  He had teeth extraction.  Reports not on antibiotics at that time pre/post dental procedure.  Had some intermittent flushed/fever episodes in October with temp 101 by his report.  Dental issues had somewhat improved but then right knee issue developed as above.      -No significant leukocytosis (last CBC 11/7/24)  No fevers  -Cultures remain no growth to date     - has been on IV vanco and IV rocephin - switched to IV rocephin alone on 11/7/24 per infectious disease   No crystals seen on exam of fluid.    -Highly appreciate extensive and prompt input from operative orthopedics service and infectious disease    Lyme disease workup has been negative.    Continue Aspirin for DVT prophylaxis twice daily dosing as per Ortho recommendations     Per ID - orders placed for MIDLINE and 3-week total course of IV rocephin  -- Plans for TCU discharge.   Will hold discharge planning today given uncontrolled glucose levels.  -Also waiting for midline insertion                 DM type II, poor control increasing infection risk  Polyneuropathy:    Glucose 421 in ED, last HgbA1c 8.7 on 10/23/24. Per chart review, pt has low health literacy. He has been on metformin and glipizide, Jardiance was started ~10 days ago. Per pt, he stopped this due to some issues with reflux, unclear.  -Now with uncontrolled glucose levels.  He thinks his oral intake might improve later on.  He mentioned that he has not eaten much at least in the past several weeks.    Glucose level still is more than 300 mg/dL and average  Now on prandial insulin coverage.  Increase Lantus.  Restarted metformin.  Restarted lower dose of sulfonylurea     HTN:  Managed with Lisinopril and hydrochlorothiazide.  -Resume home regimen    SBP still up and down (140-150's)  Continue pain control and close clinical monitoring       GERD:  Recently started on Famotidine by PCP, did not tolerate PPI  - famotidine to continue         Consultations This Hospital Stay   PHARMACY TO DOSE VANCO  PHARMACY TO DOSE VANCO  ORTHOPEDIC SURGERY IP CONSULT  INFECTIOUS DISEASES IP CONSULT  PHYSICAL THERAPY ADULT IP CONSULT  OCCUPATIONAL THERAPY ADULT IP CONSULT  CARE MANAGEMENT / SOCIAL WORK IP CONSULT  VASCULAR ACCESS ADULT IP CONSULT  VASCULAR ACCESS ADULT IP CONSULT  PHYSICAL THERAPY ADULT IP CONSULT  OCCUPATIONAL THERAPY ADULT IP CONSULT    Code Status   Full Code    Time Spent on this Encounter   I, Rigoberto Barnett MD, MD, personally saw the patient today and spent greater than 30 minutes discharging this patient.       Rigoberto Barnett MD, MD  [place] [address] [facility]  [address] [place] [address]  Phone: [phone]  Fax: [phone]  ______________________________________________________________________    Physical Exam   Vital Signs: Temp: 97.7  F (36.5  C) Temp src: Oral BP:  134/78 Pulse: 88   Resp: 18 SpO2: 96 % O2 Device: None (Room air)    Weight: 260 lbs 2.28 oz  HEENT; Atraumatic, normocephalic, pinkish conjuctiva, pupils bilateral reactive   Skin: warm and moist, no rashes  Midline present left upper extremity  Lymphatics: no cervical or axillary lymphandenopathy  Lungs: equal chest expansion, clear to auscultation, no wheezes, no stridor, no crackles,   Heart: normal rate, normal rhythm, no rubs or gallops.   Abdomen: normal bowel sounds, no tenderness, no peritoneal signs, no guarding  Extremities: no deformities, no edema   Neuro; follow commands, alert and oriented x3, spontaneous speech, coherent, moves all extremities spontaneously  Psych; no hallucination, euthymic mood, not agitated         Primary Care Physician   Prerna Lucas    Discharge Orders      Home Infusion Referral      Reason for your hospital stay    S/p right knee open I&D (DOS: 11/5/24) performed by Dr. Steve Guerra     Follow-up and recommended labs and tests     Please call as soon as possible to make an appointment to be seen in Dr. Steve Guerra's clinic at 2 weeks postop for a recheck of your surgical site, possible repeat x-rays, and wound care. If you are at a TCU at this time and they have x-ray capabilities, you may complete your wound care and x-rays at your TCU and have them send your images to Dr. Guerra's office.     Dr. Guerra's care coordinator is Sarah. Please contact her at [phone] to schedule an appointment. Dr. Guerra's care team's fax number is [phone].     Dr. Guerra sees patients at 3 clinic locations:  [facility]  [address], [place] [address]  [facility]  [address], [place] [address]  [facility]  [address], [place] [address]      Please call the on-call phone number [phone] during evenings, nights and weekends for any urgent needs. Prescription  refills must be done during business hours by contacting your surgical team.     Activity    Your activity upon discharge: WBAT RLE with or without gait assist, gentle ROM     Wound care and dressings    Instructions to care for your wound at home: Please leave dressing intact for 2 weeks postoperatively. Okay to change if saturated >60% or peeling. If an Aquacel or gauze/tegaderm is in place over your surgical sites, it is okay to shower without covering the dressings. If you have a soft dressing, you must securely cover your dressing while showering or sponge bathe. Absolutely no soaking or submersion. Please contact your orthopedic surgical team if persistent drainage or redness develops around the surgical site.     Discharge Instructions    Pain after surgery is normal and expected.  You will have some amount of pain and swelling for several weeks after surgery.  These symptoms will improve with time.  There are several things you can do to help reduce your pain including: rest, cold compresses, elevation, and using pain medications as needed. Contact your Surgeon Team if you have pain that persists or worsens after surgery despite rest, ice, elevation, and taking your medication(s) as prescribed. Contact your Surgeon Team if you have new numbness, tingling, or weakness in your operative extremity.    Swelling and/or bruising of the surgical extremity is common and may persist for several months after surgery. In addition to frequent icing and elevation, gentle compressive support with an ACE wrap or tubigrip may help with swelling. Apply compression regularly, removing at least twice daily to perform skin checks. Contact your Surgeon Team if your swelling increases and is NOT associated with an increase in your activity level, or if your swelling increases and is associated with redness and pain.    Ice can be used to control swelling and discomfort after surgery. Place a thin towel over your operative site  and apply the ice pack overtop. Leave ice pack in place for 20 minutes, then remove for 20 minutes. Repeat this 20 minutes on/20 minutes off routine as often as tolerated.    Please contact your surgical team with any concerns for infection including increasing redness around your surgical site, pus-like drainage, fever, chills, or flu-like symptoms.     General info for SNF    Length of Stay Estimate: Short Term Care: Estimated # of Days <30  Condition at Discharge: Improving  Level of care:skilled   Rehabilitation Potential: Good  Admission H&P remains valid and up-to-date: Yes  Recent Chemotherapy: N/A  Use Nursing Home Standing Orders: Yes     Mantoux instructions    Give two-step Mantoux (PPD) Per Facility Policy Yes     Follow Up and recommended labs and tests    Follow up with Nursing home physician.  Continue to monitor glucose levels  Follow-up with orthopedics as scheduled  Follow-up with infectious disease service as scheduled  He will need to follow-up with his PCP after discharge from transitional care unit and will need podiatry, ophthalmology for routine diabetes care as well.     Reason for your hospital stay    Cricket is a 54-year-old independent living gentleman with background history of non-insulin-requiring diabetes mellitus, hypertension, polyneuropathy and GERD who presented due to increasing pain on right lower extremity and decreasing range of motion on the right knee with high suspicion for underlying right knee septic arthritis.  He underwent right knee arthrotomy and irrigation debridement last November 5, 2024 of which she tolerated the procedure well.  He has been on IV antibiotics coverage and optimized from ID perspective with decisions to continue further IV antibiotics upon discharge with ceftriaxone via midline.  Currently no growth with culture sent earlier.  Endorsing no fevers, chills, nausea, vomiting nor diarrhea.  Participating with therapy services.  He is demonstrating  increasing oral intake with no issues with chest pain or shortness of breath or aspiration like symptoms.  Currently stable hemodynamics with no fever spikes.  Found with uncontrolled glucose levels.  Initiated on Lantus to be continued upon discharge and resumption of patient's metformin but decrease dose of sulfonylurea due to initiation of basal Lantus.  Further optimization and adjustment based on glucose levels as patient is also being discharged going to transitional care unit setting is physical deconditioned state and will need therapy.     Activity - Up with nursing assistance     Full Code     Physical Therapy Adult Consult    Evaluate and treat as clinically indicated.    Reason: Physical deconditioning     Occupational Therapy Adult Consult    Evaluate and treat as clinically indicated.    Reason: Physical deconditioning     Fall precautions     Crutches DME    DME Documentation: Describe the reason for need to support medical necessity: Impaired gait status post knee surgery. I, the undersigned, certify that the above prescribed supplies are medically necessary for this patient and is both reasonable and necessary in reference to accepted standards of medical practice in the treatment of this patient's condition and is not prescribed as a convenience.     Cane DME    DME Documentation: Describe the reason for need to support medical necessity: Impaired gait status post knee surgery. I, the undersigned, certify that the above prescribed supplies are medically necessary for this patient and is both reasonable and necessary in reference to accepted standards of medical practice in the treatment of this patient's condition and is not prescribed as a convenience.     Walker DME    DME Documentation: Describe the reason for need to support medical necessity: Impaired gait status post knee surgery. I, the undersigned, certify that the above prescribed supplies are medically necessary for this patient and is  both reasonable and necessary in reference to accepted standards of medical practice in the treatment of this patient's condition and is not prescribed as a convenience.     Diet    Follow this diet upon discharge: Current Diet:Orders Placed This Encounter      Snacks/Supplements Adult: Glucerna; Between Meals      Moderate Consistent Carb (60 g CHO per Meal) Diet       Significant Results and Procedures   Most Recent 3 CBC's:  Recent Labs   Lab Test 11/07/24  0550 11/06/24  0611 11/05/24  0544   WBC 8.4 12.6* 12.2*   HGB 14.0 14.1 14.1   MCV 80 80 80    241 225     Most Recent 3 BMP's:  Recent Labs   Lab Test 11/10/24  0812 11/10/24  0602 11/10/24  0204 11/09/24  2134 11/09/24  0728 11/09/24  0647 11/08/24  0829 11/08/24  0658 11/07/24  0758 11/07/24  0550 11/06/24  0959 11/06/24  0611   NA  --   --   --   --   --  131*  --   --   --  134*  --  133*   POTASSIUM  --   --   --   --   --  4.0  --   --   --  3.8  --  3.7   CHLORIDE  --   --   --   --   --  96*  --   --   --  98  --  97*   CO2  --   --   --   --   --  24  --   --   --  23  --  22   BUN  --   --   --   --   --  12.9  --   --   --  13.5  --  12.8   CR  --  0.72  --   --   --  0.71  --  0.74  --  0.72  --  0.75   ANIONGAP  --   --   --   --   --  11  --   --   --  13  --  14   ARELY  --   --   --   --   --  8.9  --   --   --  9.0  --  9.1   *  --  166* 159*   < > 313*   < >  --    < > 289*   < > 266*    < > = values in this interval not displayed.     Most Recent 2 LFT's:  Recent Labs   Lab Test 11/04/24  1324 06/21/23  0602   AST 13 20   ALT 42 33   ALKPHOS 76 68   BILITOTAL 1.5* 1.0     Most Recent 3 INR's:No lab results found.  Most Recent Cholesterol Panel:  Recent Labs   Lab Test 05/26/23  1622   CHOL 218*   LDL 89   HDL 59   TRIG 348*     7-Day Micro Results       Collected Updated Procedure Result Status      11/05/2024 1155 11/09/2024 1546 Anaerobic Bacterial Culture Routine [[id number]]    Tissue from Knee, Right    Preliminary  result Component Value   Culture No anaerobic organisms isolated after 4 days  [P]                11/05/2024 1155 11/05/2024 1555 Gram Stain [[id number]]   Tissue from Knee, Right    Final result Component Value   GS Culture See corresponding culture for results   Gram Stain Result No organisms seen   Gram Stain Result 4+ WBC seen   Predominantly PMNs            11/05/2024 1155 11/10/2024 0713 Tissue Aerobic Bacterial Culture Routine [[id number]]    Tissue from Knee, Right    Final result Component Value   Culture No Growth               11/05/2024 1153 11/09/2024 1546 Anaerobic Bacterial Culture Routine [[id number]]    Tissue from Knee, Right    Preliminary result Component Value   Culture No anaerobic organisms isolated after 4 days  [P]                11/05/2024 1153 11/05/2024 1616 Gram Stain [[id number]]   Tissue from Knee, Right    Final result Component Value   GS Culture See corresponding culture for results   Gram Stain Result No organisms seen   Gram Stain Result 4+ WBC seen   Predominantly PMNs            11/05/2024 1153 11/10/2024 0713 Tissue Aerobic Bacterial Culture Routine [[id number]]    Tissue from Knee, Right    Final result Component Value   Culture No Growth               11/04/2024 1828 11/09/2024 2031 Blood Culture Peripheral Blood [[id number]]   Peripheral Blood    Final result Component Value   Culture No Growth               11/04/2024 1736 11/04/2024 1959 Cell count with differential fluid [[id number]]    (Abnormal)   Synovial fluid from Knee, Right    Final result Component Value   No component results            11/04/2024 1736 11/09/2024 0712 Synovial fluid Aerobic Bacterial Culture Routine With Gram Stain [[id number]]   Synovial fluid from Knee, Right    Final result Component Value   Culture No Growth   Gram Stain Result No organisms seen    4+ WBC seen    Predominantly PMNs               11/04/2024 1736 11/04/2024 2105 Crystal ID Synovial Fluid [[id number]]    Synovial fluid from Knee, Right    Final result Component Value   Crystals Analysis No clinically significant crystals seen.            11/04/2024 1736 11/04/2024 1959 Cell Count Body Fluid [[id number]]    (Abnormal)   Synovial fluid from Knee, Right    Final result Component Value Units   Color Yellow    Clarity Turbid    This is a corrected result. Previous result was Cloudy on 11/4/2024 at  7:00 PM CST   Cell Count Fluid Source Knee, Right    Total Nucleated Cells 97,500 /uL            11/04/2024 1736 11/04/2024 1959 Differential Body Fluid [[id number]]    Synovial fluid from Knee, Right    Final result Component Value Units   % Neutrophils 86 %   % Lymphocytes 9 %   % Monocyte/Macrophages 5 %            11/04/2024 1324 11/09/2024 1631 Blood Culture Hand, Right [[id number]]   Blood from Hand, Right    Final result Component Value   Culture No Growth                     Most Recent TSH and T4:  Recent Labs   Lab Test 08/14/23  1827   TSH 2.36     Most Recent Hemoglobin A1c:  Recent Labs   Lab Test 08/14/23  1827   A1C 8.8*     Most Recent 6 glucoses:  Recent Labs   Lab Test 11/10/24  0812 11/10/24  0204 11/09/24  2134 11/09/24  1656 11/09/24  1223 11/09/24  0728   * 166* 159* 368* 305* 303*     Most Recent Urinalysis:  Recent Labs   Lab Test 08/14/23  2047 06/20/23  0928 02/08/21  1327   COLOR Light Yellow   < > Yellow   APPEARANCE Clear   < > Clear   URINEGLC >=1000*   < > 500*   URINEBILI Negative   < > Negative   URINEKETONE 20*   < > Negative   SG 1.017   < > 1.015   UBLD Negative   < > Negative   URINEPH 5.0   < > 5.0   PROTEIN Negative   < > Negative   UROBILINOGEN  --   --  0.2   NITRITE Negative   < > Negative   LEUKEST Negative   < > Negative   RBCU <1   < >  --    WBCU <1   < >  --     < > = values in this interval not displayed.   ,   Results for orders placed or performed during the hospital encounter of 11/04/24   XR Knee Right 3 Views    Narrative    XR KNEE RIGHT 3 VIEWS   11/4/2024  1:50 PM     HISTORY: knee pain  COMPARISON: None.       Impression    IMPRESSION:     There are small patellofemoral compartment marginal osteophytes,  without significant knee joint space narrowing. There is a knee joint  effusion. No displaced fracture.  RUSLAN DEL VALLE MD         SYSTEM ID:  [id number]       Discharge Medications   Current Discharge Medication List        START taking these medications    Details   acetaminophen (TYLENOL) 325 MG tablet Take 2 tablets (650 mg) by mouth every 6 hours as needed for mild pain or other (and adjunct with moderate or severe pain or per patient request).  Qty: 60 tablet, Refills: 0    Associated Diagnoses: Pyogenic arthritis of right knee joint, due to unspecified organism (H); Staphylococcal arthritis of right knee (H)      cefTRIAXone (ROCEPHIN) 1 GM vial Inject 2 g (2,000 mg) over 15-30 minutes into the vein daily for 18 days. ESR,CRP,CBC with differential, creatinine, SGOT weekly while on this medication to be faxed to Dr. Love office.  Qty: 1 each, Refills: 1    Associated Diagnoses: Pyogenic arthritis of right knee joint, due to unspecified organism (H)      insulin glargine (LANTUS PEN) 100 UNIT/ML pen Inject 20 Units subcutaneously every morning (before breakfast).    Comments: If Lantus is not covered by insurance, may substitute Basaglar or Semglee or other insulin glargine product per insurance preference at same dose and frequency.    Associated Diagnoses: Type 2 diabetes mellitus without complication, without long-term current use of insulin (H)      oxyCODONE (ROXICODONE) 5 MG tablet Take 0.5-1 tablets (2.5-5 mg) by mouth every 4 hours as needed for moderate to severe pain (Take 2.5 mg (1/2 tab) for pain 4-6/10, take 5 mg (1 tab) for pain 7-10/10).  Qty: 10 tablet, Refills: 0    Associated Diagnoses: Pyogenic arthritis of right knee joint, due to unspecified organism (H); Staphylococcal arthritis of right knee (H)      polyethylene glycol (MIRALAX) 17  GM/Dose powder Take 17 g by mouth daily.    Associated Diagnoses: Pyogenic arthritis of right knee joint, due to unspecified organism (H); Staphylococcal arthritis of right knee (H)           CONTINUE these medications which have CHANGED    Details   aspirin 81 MG EC tablet Take 1 tablet (81 mg) by mouth 2 times daily.    Associated Diagnoses: Pyogenic arthritis of right knee joint, due to unspecified organism (H); Staphylococcal arthritis of right knee (H)      glipiZIDE (GLUCOTROL XL) 10 MG 24 hr tablet Take 1 tablet (10 mg) by mouth daily (with breakfast).  Qty: 30 tablet, Refills: 0    Associated Diagnoses: Type 2 diabetes mellitus without complication, without long-term current use of insulin (H)           CONTINUE these medications which have NOT CHANGED    Details   famotidine (PEPCID) 20 MG tablet Take 20 mg by mouth 2 times daily (before meals).      hydrochlorothiazide (HYDRODIURIL) 25 MG tablet Take 25 mg by mouth daily.      lisinopril (ZESTRIL) 40 MG tablet Take 40 mg by mouth daily.      metFORMIN (GLUCOPHAGE XR) 500 MG 24 hr tablet Take 1,000 mg by mouth 2 times daily (with meals).      multivitamin w/minerals (MULTI-VITAMIN) tablet Take 1 tablet by mouth daily.      blood glucose (NO BRAND SPECIFIED) test strip Use to test blood sugar 4 times daily or as directed.  Qty: 100 strip, Refills: 6    Comments: To accompany: glucometer per insurance.  Associated Diagnoses: Diabetic ketoacidosis without coma associated with type 2 diabetes mellitus (H)      blood glucose monitoring (NO BRAND SPECIFIED) meter device kit Use to test blood sugar 4 times daily or as directed.  Qty: 1 kit, Refills: 0    Comments: Preferred blood glucose meter OR supplies to accompany: glucometer per insurance  Associated Diagnoses: Diabetic ketoacidosis without coma associated with type 2 diabetes mellitus (H)           STOP taking these medications       glimepiride (AMARYL) 2 MG tablet Comments:   Reason for Stopping:              Allergies   No Known Allergies

## 2024-11-11 ENCOUNTER — DOCUMENTATION ONLY (OUTPATIENT)
Dept: GERIATRICS | Facility: CLINIC | Age: 54
End: 2024-11-11

## 2024-11-11 ENCOUNTER — HOME INFUSION BILLING (OUTPATIENT)
Dept: HOME HEALTH SERVICES | Facility: HOME HEALTH | Age: 54
End: 2024-11-11

## 2024-11-11 ENCOUNTER — TRANSITIONAL CARE UNIT VISIT (OUTPATIENT)
Dept: GERIATRICS | Facility: CLINIC | Age: 54
End: 2024-11-11
Payer: COMMERCIAL

## 2024-11-11 ENCOUNTER — LAB REQUISITION (OUTPATIENT)
Dept: LAB | Facility: CLINIC | Age: 54
End: 2024-11-11
Payer: COMMERCIAL

## 2024-11-11 VITALS
SYSTOLIC BLOOD PRESSURE: 136 MMHG | HEART RATE: 88 BPM | WEIGHT: 254 LBS | DIASTOLIC BLOOD PRESSURE: 93 MMHG | HEIGHT: 74 IN | OXYGEN SATURATION: 97 % | BODY MASS INDEX: 32.6 KG/M2 | TEMPERATURE: 97 F | RESPIRATION RATE: 18 BRPM

## 2024-11-11 DIAGNOSIS — I10 BENIGN ESSENTIAL HYPERTENSION: ICD-10-CM

## 2024-11-11 DIAGNOSIS — G89.18 POSTOPERATIVE PAIN: Primary | ICD-10-CM

## 2024-11-11 DIAGNOSIS — K21.9 GASTROESOPHAGEAL REFLUX DISEASE WITHOUT ESOPHAGITIS: ICD-10-CM

## 2024-11-11 DIAGNOSIS — M00.061 STAPHYLOCOCCAL ARTHRITIS OF RIGHT KNEE (H): ICD-10-CM

## 2024-11-11 DIAGNOSIS — M00.861 ARTHRITIS DUE TO OTHER BACTERIA, RIGHT KNEE (H): ICD-10-CM

## 2024-11-11 DIAGNOSIS — E11.65 UNCONTROLLED TYPE 2 DIABETES MELLITUS WITH HYPERGLYCEMIA (H): ICD-10-CM

## 2024-11-11 DIAGNOSIS — E11.9 TYPE 2 DIABETES MELLITUS WITHOUT COMPLICATIONS (H): ICD-10-CM

## 2024-11-11 PROCEDURE — 99310 SBSQ NF CARE HIGH MDM 45: CPT | Performed by: NURSE PRACTITIONER

## 2024-11-11 PROCEDURE — 99418 PROLNG IP/OBS E/M EA 15 MIN: CPT | Performed by: NURSE PRACTITIONER

## 2024-11-11 PROCEDURE — 86481 TB AG RESPONSE T-CELL SUSP: CPT | Mod: ORL | Performed by: NURSE PRACTITIONER

## 2024-11-11 PROCEDURE — 36415 COLL VENOUS BLD VENIPUNCTURE: CPT | Mod: ORL | Performed by: NURSE PRACTITIONER

## 2024-11-11 NOTE — PATIENT INSTRUCTIONS
[facility]   2024     Name: Scout Perez   : 1970       Orders  - RLE wound care: cleanse incision w/wound cleanser, pat dry. Apply primapore dressing. Change every day.   - Discontinue oxycodone  - Add oxycodone 5mg TID at 8AM, 12PM, 8PM dx postop pain  - Add oxycodone 2.5-5mg every 4hrs PRN dx postop pain  - Add ice 20min QID and PRN for postop pain  - Decrease lantus to 16units every day dx diabetes  - Check weekly labs: ESR,CRP,CBC with differential, creatinine, SGOT weekly x 3 weeks; started on 24 dx R knee septic arthritis  - Check BMP, A1c on 24 dx diabetes      Electronically signed by   GUNNAR Walsh CNP on 2024 at 4:39 PM

## 2024-11-11 NOTE — LETTER
11/11/2024      Scout Perez  [address] [facility] [address]        [facility] GERIATRICS    PRIMARY CARE PROVIDER AND CLINIC:  Chelsey A. Sand, DO, [place] [facility] [address] [facility] [place] [address] [place]   Chief Complaint   Patient presents with     [facility] Medical Record Number:  [id number]  Place of Service where encounter took place:  [facility] ([facility]) [[id number]]    Scout Perez  is a 54 year old  (1970), admitted to the above facility from  [facility]. Hospital stay 11/4/24 through 11/10/24.      HPI:      PMH: diabetes mellitus, hypertension, polyneuropathy and GERD     Admitted to [facility] 11/4-11/10/24 due to increasing pain on right lower extremity and decreasing range of motion on the right knee with high suspicion for underlying right knee septic arthritis. Hx recent dental procedure/possible infection in 09/2024. S/p R knee arthrotomy and I&D on 11/5/24, was started on IV vanco and IV ceftriaxone. Continued on IV ceftriaxone x 3 weeks upon discharge. Started on lantus 20units every day, new to patient.    Transferred to [facility] on 11/10/24.      Today's concerns:    During exam, patient seen resting in bed. Endorses uncontrolled pain to R knee, requesting to add scheduled oxycodone. Reports insulin is new and will not need it upon discharge. States he attempted insulin in the past and doesn't do well on it, reports BG become too low. Admits to improved appetite. Sleeping well at night. Denies constipation, diarrhea. Denies chest pain, SOB, headache, syncope.      CODE STATUS/ADVANCE DIRECTIVES DISCUSSION:  Full Code      ALLERGIES: No Known Allergies   PAST MEDICAL HISTORY:   Past Medical History:   Diagnosis Date     Benign essential hypertension      Learning disability      Type 2 diabetes mellitus (H)       PAST SURGICAL HISTORY:   has a past surgical history that includes Irrigation and debridement knee,  "combined (Right, 11/5/2024).  FAMILY HISTORY: family history includes Alcoholism in his brother; Cerebrovascular Disease in his father; Coronary Artery Disease Early Onset in his cousin; Diabetes in his brother, niece, and paternal grandfather; Heart Disease in his mother; Hypertension in his mother; Kidney Disease in his father; Mental Illness in his father; Myocardial Infarction in his father.  SOCIAL HISTORY:   reports that he has never smoked. He has never used smokeless tobacco. He reports that he does not currently use alcohol. He reports that he does not use drugs.  Patient's living condition: lives alone    Post Discharge Medication Reconciliation Status:   MED REC REQUIRED  Post Medication Reconciliation Status: discharge medications reconciled and changed, per note/orders       Current Outpatient Medications   Medication Sig Dispense Refill     acetaminophen (TYLENOL) 325 MG tablet Take 2 tablets (650 mg) by mouth every 6 hours as needed for mild pain or other (and adjunct with moderate or severe pain or per patient request). 60 tablet 0     aspirin 81 MG EC tablet Take 1 tablet (81 mg) by mouth 2 times daily.       blood glucose (NO BRAND SPECIFIED) test strip Use to test blood sugar 4 times daily or as directed. 100 strip 6     blood glucose monitoring (NO BRAND SPECIFIED) meter device kit Use to test blood sugar 4 times daily or as directed. 1 kit 0     cefTRIAXone (ROCEPHIN) 2,000 mg in sodium chloride 0.9 % 60 mL via HOMEPUMP Infuse 2,000 mg over 15 minutes into the vein every 24 hours for 15 days. 830547 mL 0     Emergency Supply Kit, Central, Patient use for emergency only. Contents: 3 sodium chloride 0.9% flushes, 1 dressing kit, 1 microclave ext set 14\", 4 nitrile gloves (med), 6 alcohol prep pads, 1 bacitracin, 1 syringe (10 cc 20 G 1\"). Call [phone] to reorder. 930901 kit 0     famotidine (PEPCID) 20 MG tablet Take 20 mg by mouth 2 times daily (before meals).       glipiZIDE (GLUCOTROL " "XL) 10 MG 24 hr tablet Take 1 tablet (10 mg) by mouth daily (with breakfast). 30 tablet 0     hydrochlorothiazide (HYDRODIURIL) 25 MG tablet Take 25 mg by mouth daily.       insulin glargine (LANTUS PEN) 100 UNIT/ML pen Inject 20 Units subcutaneously every morning (before breakfast).       lisinopril (ZESTRIL) 40 MG tablet Take 40 mg by mouth daily.       metFORMIN (GLUCOPHAGE XR) 500 MG 24 hr tablet Take 1,000 mg by mouth 2 times daily (with meals).       multivitamin w/minerals (MULTI-VITAMIN) tablet Take 1 tablet by mouth daily.       oxyCODONE (ROXICODONE) 5 MG tablet Take 0.5-1 tablets (2.5-5 mg) by mouth every 4 hours as needed for moderate to severe pain (Take 2.5 mg (1/2 tab) for pain 4-6/10, take 5 mg (1 tab) for pain 7-10/10). 10 tablet 0     polyethylene glycol (MIRALAX) 17 GM/Dose powder Take 17 g by mouth daily.       sodium chloride, PF, 0.9% PF flush Inject 10 mLs into the vein as needed for line flush. Flush IV before and after medication administration as directed and/or at least every 12 hours. 889697 mL 0     No current facility-administered medications for this visit.       ROS:  10 point ROS of systems including Constitutional, Eyes, Respiratory, Cardiovascular, Gastroenterology, Genitourinary, Integumentary, Musculoskeletal, Psychiatric were all negative except for pertinent positives noted in my HPI.    Vitals:  BP (!) 136/93   Pulse 88   Temp 97  F (36.1  C)   Resp 18   Ht 1.88 m (6' 2\")   Wt 115.2 kg (254 lb)   SpO2 97%   BMI 32.61 kg/m    Exam:  GENERAL APPEARANCE:  Alert, in no distress, appears healthy, oriented, cooperative  ENT:  Mouth and posterior oropharynx normal, moist mucous membranes, normal hearing acuity  EYES:  EOM, conjunctivae, lids, pupils and irises normal, PERRL  RESP:  respiratory effort and palpation of chest normal, lungs clear to auscultation , no respiratory distress  CV:  regular rate and rhythm, no murmur, rub, or gallop, no edema  ABDOMEN:  normal bowel " sounds, soft, nontender, no guarding or rebound  M/S:   Gait and station abnormal , resting in bed.   SKIN:  Inspection of skin and subcutaneous tissue baseline, Palpation of skin and subcutaneous tissue baseline, . R knee dressing loose, staples intact.  NEURO:   Cranial nerves 2-12 are normal tested and grossly at patient's baseline, Examination of sensation by touch normal  PSYCH:  oriented X 3, affect and mood normal      Lab/Diagnostic data:  Recent labs in [facility] reviewed by me today.   Most Recent 3 CBC's:  Recent Labs   Lab Test 11/07/24  0550 11/06/24  0611 11/05/24  0544   WBC 8.4 12.6* 12.2*   HGB 14.0 14.1 14.1   MCV 80 80 80    241 225     Most Recent 3 BMP's:  Recent Labs   Lab Test 11/10/24  1147 11/10/24  0812 11/10/24  0602 11/10/24  0204 11/09/24  0728 11/09/24  0647 11/08/24  0829 11/08/24  0658 11/07/24  0758 11/07/24  0550 11/06/24  0959 11/06/24  0611   NA  --   --   --   --   --  131*  --   --   --  134*  --  133*   POTASSIUM  --   --   --   --   --  4.0  --   --   --  3.8  --  3.7   CHLORIDE  --   --   --   --   --  96*  --   --   --  98  --  97*   CO2  --   --   --   --   --  24  --   --   --  23  --  22   BUN  --   --   --   --   --  12.9  --   --   --  13.5  --  12.8   CR  --   --  0.72  --   --  0.71  --  0.74  --  0.72  --  0.75   ANIONGAP  --   --   --   --   --  11  --   --   --  13  --  14   ARELY  --   --   --   --   --  8.9  --   --   --  9.0  --  9.1   * 254*  --  166*   < > 313*   < >  --    < > 289*   < > 266*    < > = values in this interval not displayed.     Most Recent 2 LFT's:  Recent Labs   Lab Test 11/04/24  1324 06/21/23  0602   AST 13 20   ALT 42 33   ALKPHOS 76 68   BILITOTAL 1.5* 1.0     Most Recent Hemoglobin A1c:  Recent Labs   Lab Test 08/14/23  1827   A1C 8.8*       ASSESSMENT/PLAN:    (G89.18) Postoperative pain  (primary encounter diagnosis)  (M00.061) Staphylococcal arthritis of right knee (H)  Comment: Staph septic arthritis of R knee associated with  teeth extractions in 09/2024. S/p R knee arthrotomy and irrigation debridement on 11/5/24. Postop pain uncontrolled.  Plan:   - RLE wound care: cleanse incision w/wound cleanser, pat dry. Apply primapore dressing. Change every day.   - Discontinue oxycodone  - Add oxycodone 5mg TID at 8AM, 12PM, 8PM dx postop pain  - Add oxycodone 2.5-5mg every 4hrs PRN dx postop pain  - Add ice 20min QID and PRN for postop pain  - Continue ASA 81mg BID for DVT prophylaxis  - Continue IV ceftriaxone; ID following  - Check weekly labs while on IV ceftriaxone: ESR,CRP,CBC with differential, creatinine, SGOT weekly x 3 weeks; started on 11/12/24 dx R knee septic arthritis  - WBAT to RLE  - Follow-up with Dr. Guerra in 2 weeks  - Confirmed code status: Full code  - Continue PT, OT  - SW following for discharge planning. Goal is to discharge home.    (E11.65) Uncontrolled type 2 diabetes mellitus with hyperglycemia (H)  Comment: Uncontrolled, last A1c 8.7% on 10/23/24.   Plan:   - Decrease lantus to 16units every day; patient's goal is to wean off insulin prior to discharge  - Continue metformin, glipizide  - Monitor BG    (I10) Benign essential hypertension  Comment: Controlled  Plan:   - Continue lisinopril, hydrochlorothiazide     (K21.9) Gastroesophageal reflux disease without esophagitis  Comment: Chronic  Plan:   - Continue famotidine      Orders:  - RLE wound care: cleanse incision w/wound cleanser, pat dry. Apply primapore dressing. Change every day.   - Discontinue oxycodone  - Add oxycodone 5mg TID at 8AM, 12PM, 8PM dx postop pain  - Add oxycodone 2.5-5mg every 4hrs PRN dx postop pain  - Add ice 20min QID and PRN for postop pain  - Decrease lantus to 16units every day dx diabetes  - Check weekly labs: ESR,CRP,CBC with differential, creatinine, SGOT weekly x 3 weeks; started on 11/12/24 dx R knee septic arthritis  - Check BMP, A1c on 11/12/24 dx diabetes        Total time spent during today's visit was 60 mins including patient  visit  (0760-2965; 40 minutes) and review of past records (20 minutes).     Electronically signed by:  GUNNAR Walsh CNP           Sincerely,        GUNNAR Walsh CNP

## 2024-11-11 NOTE — PROGRESS NOTES
[facility] GERIATRICS    PRIMARY CARE PROVIDER AND CLINIC:  Chelsey A. Sand, DO, [place] [facility] [address] [facility] [place] [address] [place]   Chief Complaint   Patient presents with    [facility] Medical Record Number:  [id number]  Place of Service where encounter took place:  [facility] (TCU) [[id number]]    Scout Perez  is a 54 year old  (1970), admitted to the above facility from  [facility]. Hospital stay 11/4/24 through 11/10/24.      HPI:      PMH: diabetes mellitus, hypertension, polyneuropathy and GERD     Admitted to [facility] 11/4-11/10/24 due to increasing pain on right lower extremity and decreasing range of motion on the right knee with high suspicion for underlying right knee septic arthritis. Hx recent dental procedure/possible infection in 09/2024. S/p R knee arthrotomy and I&D on 11/5/24, was started on IV vanco and IV ceftriaxone. Continued on IV ceftriaxone x 3 weeks upon discharge. Started on lantus 20units every day, new to patient.    Transferred to [facility] on 11/10/24.      Today's concerns:    During exam, patient seen resting in bed. Endorses uncontrolled pain to R knee, requesting to add scheduled oxycodone. Reports insulin is new and will not need it upon discharge. States he attempted insulin in the past and doesn't do well on it, reports BG become too low. Admits to improved appetite. Sleeping well at night. Denies constipation, diarrhea. Denies chest pain, SOB, headache, syncope.      CODE STATUS/ADVANCE DIRECTIVES DISCUSSION:  Full Code      ALLERGIES: No Known Allergies   PAST MEDICAL HISTORY:   Past Medical History:   Diagnosis Date    Benign essential hypertension     Learning disability     Type 2 diabetes mellitus (H)       PAST SURGICAL HISTORY:   has a past surgical history that includes Irrigation and debridement knee, combined (Right, 11/5/2024).  FAMILY HISTORY: family history includes Alcoholism  "in his brother; Cerebrovascular Disease in his father; Coronary Artery Disease Early Onset in his cousin; Diabetes in his brother, niece, and paternal grandfather; Heart Disease in his mother; Hypertension in his mother; Kidney Disease in his father; Mental Illness in his father; Myocardial Infarction in his father.  SOCIAL HISTORY:   reports that he has never smoked. He has never used smokeless tobacco. He reports that he does not currently use alcohol. He reports that he does not use drugs.  Patient's living condition: lives alone    Post Discharge Medication Reconciliation Status:   MED REC REQUIRED  Post Medication Reconciliation Status: discharge medications reconciled and changed, per note/orders       Current Outpatient Medications   Medication Sig Dispense Refill    acetaminophen (TYLENOL) 325 MG tablet Take 2 tablets (650 mg) by mouth every 6 hours as needed for mild pain or other (and adjunct with moderate or severe pain or per patient request). 60 tablet 0    aspirin 81 MG EC tablet Take 1 tablet (81 mg) by mouth 2 times daily.      blood glucose (NO BRAND SPECIFIED) test strip Use to test blood sugar 4 times daily or as directed. 100 strip 6    blood glucose monitoring (NO BRAND SPECIFIED) meter device kit Use to test blood sugar 4 times daily or as directed. 1 kit 0    cefTRIAXone (ROCEPHIN) 2,000 mg in sodium chloride 0.9 % 60 mL via HOMEPUMP Infuse 2,000 mg over 15 minutes into the vein every 24 hours for 15 days. 730342 mL 0    Emergency Supply Kit, Central, Patient use for emergency only. Contents: 3 sodium chloride 0.9% flushes, 1 dressing kit, 1 microclave ext set 14\", 4 nitrile gloves (med), 6 alcohol prep pads, 1 bacitracin, 1 syringe (10 cc 20 G 1\"). Call [phone] to reorder. 121280 kit 0    famotidine (PEPCID) 20 MG tablet Take 20 mg by mouth 2 times daily (before meals).      glipiZIDE (GLUCOTROL XL) 10 MG 24 hr tablet Take 1 tablet (10 mg) by mouth daily (with breakfast). 30 tablet 0 " "   hydrochlorothiazide (HYDRODIURIL) 25 MG tablet Take 25 mg by mouth daily.      insulin glargine (LANTUS PEN) 100 UNIT/ML pen Inject 20 Units subcutaneously every morning (before breakfast).      lisinopril (ZESTRIL) 40 MG tablet Take 40 mg by mouth daily.      metFORMIN (GLUCOPHAGE XR) 500 MG 24 hr tablet Take 1,000 mg by mouth 2 times daily (with meals).      multivitamin w/minerals (MULTI-VITAMIN) tablet Take 1 tablet by mouth daily.      oxyCODONE (ROXICODONE) 5 MG tablet Take 0.5-1 tablets (2.5-5 mg) by mouth every 4 hours as needed for moderate to severe pain (Take 2.5 mg (1/2 tab) for pain 4-6/10, take 5 mg (1 tab) for pain 7-10/10). 10 tablet 0    polyethylene glycol (MIRALAX) 17 GM/Dose powder Take 17 g by mouth daily.      sodium chloride, PF, 0.9% PF flush Inject 10 mLs into the vein as needed for line flush. Flush IV before and after medication administration as directed and/or at least every 12 hours. 880784 mL 0     No current facility-administered medications for this visit.       ROS:  10 point ROS of systems including Constitutional, Eyes, Respiratory, Cardiovascular, Gastroenterology, Genitourinary, Integumentary, Musculoskeletal, Psychiatric were all negative except for pertinent positives noted in my HPI.    Vitals:  BP (!) 136/93   Pulse 88   Temp 97  F (36.1  C)   Resp 18   Ht 1.88 m (6' 2\")   Wt 115.2 kg (254 lb)   SpO2 97%   BMI 32.61 kg/m    Exam:  GENERAL APPEARANCE:  Alert, in no distress, appears healthy, oriented, cooperative  ENT:  Mouth and posterior oropharynx normal, moist mucous membranes, normal hearing acuity  EYES:  EOM, conjunctivae, lids, pupils and irises normal, PERRL  RESP:  respiratory effort and palpation of chest normal, lungs clear to auscultation , no respiratory distress  CV:  regular rate and rhythm, no murmur, rub, or gallop, no edema  ABDOMEN:  normal bowel sounds, soft, nontender, no guarding or rebound  M/S:   Gait and station abnormal , resting in bed. "   SKIN:  Inspection of skin and subcutaneous tissue baseline, Palpation of skin and subcutaneous tissue baseline, . R knee dressing loose, staples intact.  NEURO:   Cranial nerves 2-12 are normal tested and grossly at patient's baseline, Examination of sensation by touch normal  PSYCH:  oriented X 3, affect and mood normal      Lab/Diagnostic data:  Recent labs in [facility] reviewed by me today.   Most Recent 3 CBC's:  Recent Labs   Lab Test 11/07/24  0550 11/06/24  0611 11/05/24  0544   WBC 8.4 12.6* 12.2*   HGB 14.0 14.1 14.1   MCV 80 80 80    241 225     Most Recent 3 BMP's:  Recent Labs   Lab Test 11/10/24  1147 11/10/24  0812 11/10/24  0602 11/10/24  0204 11/09/24  0728 11/09/24  0647 11/08/24  0829 11/08/24  0658 11/07/24  0758 11/07/24  0550 11/06/24  0959 11/06/24  0611   NA  --   --   --   --   --  131*  --   --   --  134*  --  133*   POTASSIUM  --   --   --   --   --  4.0  --   --   --  3.8  --  3.7   CHLORIDE  --   --   --   --   --  96*  --   --   --  98  --  97*   CO2  --   --   --   --   --  24  --   --   --  23  --  22   BUN  --   --   --   --   --  12.9  --   --   --  13.5  --  12.8   CR  --   --  0.72  --   --  0.71  --  0.74  --  0.72  --  0.75   ANIONGAP  --   --   --   --   --  11  --   --   --  13  --  14   ARELY  --   --   --   --   --  8.9  --   --   --  9.0  --  9.1   * 254*  --  166*   < > 313*   < >  --    < > 289*   < > 266*    < > = values in this interval not displayed.     Most Recent 2 LFT's:  Recent Labs   Lab Test 11/04/24  1324 06/21/23  0602   AST 13 20   ALT 42 33   ALKPHOS 76 68   BILITOTAL 1.5* 1.0     Most Recent Hemoglobin A1c:  Recent Labs   Lab Test 08/14/23  1827   A1C 8.8*       ASSESSMENT/PLAN:    (G89.18) Postoperative pain  (primary encounter diagnosis)  (M00.061) Staphylococcal arthritis of right knee (H)  Comment: Staph septic arthritis of R knee associated with teeth extractions in 09/2024. S/p R knee arthrotomy and irrigation debridement on 11/5/24. Postop  pain uncontrolled.  Plan:   - RLE wound care: cleanse incision w/wound cleanser, pat dry. Apply primapore dressing. Change every day.   - Discontinue oxycodone  - Add oxycodone 5mg TID at 8AM, 12PM, 8PM dx postop pain  - Add oxycodone 2.5-5mg every 4hrs PRN dx postop pain  - Add ice 20min QID and PRN for postop pain  - Continue ASA 81mg BID for DVT prophylaxis  - Continue IV ceftriaxone; ID following  - Check weekly labs while on IV ceftriaxone: ESR,CRP,CBC with differential, creatinine, SGOT weekly x 3 weeks; started on 11/12/24 dx R knee septic arthritis  - WBAT to RLE  - Follow-up with Dr. Guerra in 2 weeks  - Confirmed code status: Full code  - Continue PT, OT  - SW following for discharge planning. Goal is to discharge home.    (E11.65) Uncontrolled type 2 diabetes mellitus with hyperglycemia (H)  Comment: Uncontrolled, last A1c 8.7% on 10/23/24.   Plan:   - Decrease lantus to 16units every day; patient's goal is to wean off insulin prior to discharge  - Continue metformin, glipizide  - Monitor BG    (I10) Benign essential hypertension  Comment: Controlled  Plan:   - Continue lisinopril, hydrochlorothiazide     (K21.9) Gastroesophageal reflux disease without esophagitis  Comment: Chronic  Plan:   - Continue famotidine      Orders:  - RLE wound care: cleanse incision w/wound cleanser, pat dry. Apply primapore dressing. Change every day.   - Discontinue oxycodone  - Add oxycodone 5mg TID at 8AM, 12PM, 8PM dx postop pain  - Add oxycodone 2.5-5mg every 4hrs PRN dx postop pain  - Add ice 20min QID and PRN for postop pain  - Decrease lantus to 16units every day dx diabetes  - Check weekly labs: ESR,CRP,CBC with differential, creatinine, SGOT weekly x 3 weeks; started on 11/12/24 dx R knee septic arthritis  - Check BMP, A1c on 11/12/24 dx diabetes        Total time spent during today's visit was 60 mins including patient visit  (9211-7761; 40 minutes) and review of past records (20 minutes).     Electronically signed  by:  GUNNAR Walsh CNP

## 2024-11-12 ENCOUNTER — DOCUMENTATION ONLY (OUTPATIENT)
Dept: OTHER | Facility: CLINIC | Age: 54
End: 2024-11-12
Payer: COMMERCIAL

## 2024-11-12 LAB
ANION GAP SERPL CALCULATED.3IONS-SCNC: 13 MMOL/L (ref 7–15)
AST SERPL W P-5'-P-CCNC: 34 U/L (ref 0–45)
BACTERIA TISS BX CULT: NORMAL
BACTERIA TISS BX CULT: NORMAL
BASOPHILS # BLD AUTO: 0.1 10E3/UL (ref 0–0.2)
BASOPHILS NFR BLD AUTO: 1 %
BUN SERPL-MCNC: 13.2 MG/DL (ref 6–20)
CALCIUM SERPL-MCNC: 9.3 MG/DL (ref 8.8–10.4)
CHLORIDE SERPL-SCNC: 100 MMOL/L (ref 98–107)
CREAT SERPL-MCNC: 0.73 MG/DL (ref 0.67–1.17)
CRP SERPL-MCNC: 23.68 MG/L
EGFRCR SERPLBLD CKD-EPI 2021: >90 ML/MIN/1.73M2
EOSINOPHIL # BLD AUTO: 0.5 10E3/UL (ref 0–0.7)
EOSINOPHIL NFR BLD AUTO: 5 %
ERYTHROCYTE [DISTWIDTH] IN BLOOD BY AUTOMATED COUNT: 11.9 % (ref 10–15)
ERYTHROCYTE [SEDIMENTATION RATE] IN BLOOD BY WESTERGREN METHOD: 69 MM/HR (ref 0–20)
EST. AVERAGE GLUCOSE BLD GHB EST-MCNC: 206 MG/DL
GAMMA INTERFERON BACKGROUND BLD IA-ACNC: 0.02 IU/ML
GLUCOSE SERPL-MCNC: 182 MG/DL (ref 70–99)
HBA1C MFR BLD: 8.8 %
HCO3 SERPL-SCNC: 24 MMOL/L (ref 22–29)
HCT VFR BLD AUTO: 39.8 % (ref 40–53)
HGB BLD-MCNC: 14.4 G/DL (ref 13.3–17.7)
IMM GRANULOCYTES # BLD: 0.1 10E3/UL
IMM GRANULOCYTES NFR BLD: 1 %
LYMPHOCYTES # BLD AUTO: 2.1 10E3/UL (ref 0.8–5.3)
LYMPHOCYTES NFR BLD AUTO: 21 %
M TB IFN-G BLD-IMP: NEGATIVE
M TB IFN-G CD4+ BCKGRND COR BLD-ACNC: 9.76 IU/ML
MCH RBC QN AUTO: 29.1 PG (ref 26.5–33)
MCHC RBC AUTO-ENTMCNC: 36.2 G/DL (ref 31.5–36.5)
MCV RBC AUTO: 81 FL (ref 78–100)
MITOGEN IGNF BCKGRD COR BLD-ACNC: 0.03 IU/ML
MITOGEN IGNF BCKGRD COR BLD-ACNC: 0.04 IU/ML
MONOCYTES # BLD AUTO: 0.7 10E3/UL (ref 0–1.3)
MONOCYTES NFR BLD AUTO: 7 %
NEUTROPHILS # BLD AUTO: 6.5 10E3/UL (ref 1.6–8.3)
NEUTROPHILS NFR BLD AUTO: 65 %
NRBC # BLD AUTO: 0 10E3/UL
NRBC BLD AUTO-RTO: 0 /100
PLATELET # BLD AUTO: 297 10E3/UL (ref 150–450)
POTASSIUM SERPL-SCNC: 3.7 MMOL/L (ref 3.4–5.3)
QUANTIFERON MITOGEN: 9.78 IU/ML
QUANTIFERON NIL TUBE: 0.02 IU/ML
QUANTIFERON TB1 TUBE: 0.06 IU/ML
QUANTIFERON TB2 TUBE: 0.05
RBC # BLD AUTO: 4.94 10E6/UL (ref 4.4–5.9)
SODIUM SERPL-SCNC: 137 MMOL/L (ref 135–145)
WBC # BLD AUTO: 9.9 10E3/UL (ref 4–11)

## 2024-11-12 RX ORDER — OXYCODONE HYDROCHLORIDE 5 MG/1
TABLET ORAL
Qty: 60 TABLET | Refills: 0 | Status: SHIPPED | OUTPATIENT
Start: 2024-11-12 | End: 2024-11-12

## 2024-11-12 RX ORDER — OXYCODONE HYDROCHLORIDE 5 MG/1
TABLET ORAL
Qty: 60 TABLET | Refills: 0 | Status: SHIPPED | OUTPATIENT
Start: 2024-11-12

## 2024-11-13 ENCOUNTER — TELEPHONE (OUTPATIENT)
Dept: GERIATRICS | Facility: CLINIC | Age: 54
End: 2024-11-13
Payer: COMMERCIAL

## 2024-11-13 VITALS
DIASTOLIC BLOOD PRESSURE: 94 MMHG | HEIGHT: 74 IN | HEART RATE: 74 BPM | TEMPERATURE: 96.7 F | SYSTOLIC BLOOD PRESSURE: 161 MMHG | WEIGHT: 254 LBS | OXYGEN SATURATION: 96 % | RESPIRATION RATE: 20 BRPM | BODY MASS INDEX: 32.6 KG/M2

## 2024-11-13 NOTE — TELEPHONE ENCOUNTER
"[facility] Triage Call    Provider: GUNNAR Arias CNP   Facility: [facility] Facility Type:  TCU    Caller: Adonay  Call Back Number: [phone]    Allergies:  No Known Allergies     SBAR:     S-(situation): Nurse called to report that patient does not want his Oxycodone to be scheduled TID but instead just BID.  Patient's pain level is not that terrible and if he would need something for the pain he could utilize a PRN.      B-(background): Right knee septic arthritis    A-(assessment):     R-(recommendations): Decrease Oxycodone to 5 mg po BID (AM and PM) per patient request.        Telephone encounter sent to:  GUNNAR Arias CNP     Please send response/orders to \"Geriatrics Nurse Pool\"    Josefa Nova RN      "

## 2024-11-13 NOTE — PROGRESS NOTES
"[facility] GERIATRICS    Chief Complaint   Patient presents with    RECHECK     HPI:  Scout Perez is a 54 year old  (1970), who is being seen today for an episodic care visit at: [facility] ([facility]) [[id number]].       Today's concern is:     Patient seen today with SW and  at [facility]. Patient reports not feeling safe at [facility], hasn't been feeling well since Tuesday. States his fever returned, 102F. Reports RN staff are not reporting accurate temperatures. States staff report temp as 97F. Feels like something is wrong and doesn't want to stay at [facility].       Allergies, and PMH/PSH reviewed in [facility] today.    REVIEW OF SYSTEMS:  4 point ROS including Respiratory, CV, GI and , other than that noted in the HPI,  is negative    Objective:   BP (!) 161/94   Pulse 74   Temp (!) 96.7  F (35.9  C)   Resp 20   Ht 1.88 m (6' 2\")   Wt 115.2 kg (254 lb)   SpO2 96%   BMI 32.61 kg/m    GENERAL APPEARANCE:  Alert, in no distress, appears healthy, oriented, cooperative  ENT:  Mouth and posterior oropharynx normal, moist mucous membranes, normal hearing acuity  EYES:  EOM, conjunctivae, lids, pupils and irises normal, PERRL  RESP:  no respiratory distress  CV: no edema  M/S:   Gait and station normal, ambulates w/walker.   SKIN:  Inspection of skin and subcutaneous tissue baseline, Palpation of skin and subcutaneous tissue baseline. R knee dressing CDI.  NEURO:   Cranial nerves 2-12 are normal tested and grossly at patient's baseline, Examination of sensation by touch normal  PSYCH:  oriented X 3, affect and mood normal       Recent labs in [facility] reviewed by me today.   Most Recent 3 CBC's:  Recent Labs   Lab Test 11/12/24  0450 11/07/24  0550 11/06/24  0611   WBC 9.9 8.4 12.6*   HGB 14.4 14.0 14.1   MCV 81 80 80    251 241     Most Recent 3 BMP's:  Recent Labs   Lab Test 11/12/24  0450 11/10/24  1147 11/10/24  0812 11/10/24  0602 11/09/24  0728 11/09/24  0647 11/07/24  0758 " "11/07/24  0550     --   --   --   --  131*  --  134*   POTASSIUM 3.7  --   --   --   --  4.0  --  3.8   CHLORIDE 100  --   --   --   --  96*  --  98   CO2 24  --   --   --   --  24  --  23   BUN 13.2  --   --   --   --  12.9  --  13.5   CR 0.73  --   --  0.72  --  0.71   < > 0.72   ANIONGAP 13  --   --   --   --  11  --  13   ARELY 9.3  --   --   --   --  8.9  --  9.0   * 312* 254*  --    < > 313*   < > 289*    < > = values in this interval not displayed.     Most Recent 2 LFT's:  Recent Labs   Lab Test 11/12/24  0450 11/04/24  1324 06/21/23  0602   AST 34 13 20   ALT  --  42 33   ALKPHOS  --  76 68   BILITOTAL  --  1.5* 1.0        Latest Reference Range & Units 11/05/24 05:44 11/12/24 04:50   CRP Inflammation <5.00 mg/L 195.48 (H) 23.68 (H)         Assessment/Plan:    (G89.18) Postoperative pain  (primary encounter diagnosis)  (M00.061) Staphylococcal arthritis of right knee (H)  Comment: Staph septic arthritis of R knee associated with teeth extractions in 09/2024. S/p R knee arthrotomy and irrigation debridement on 11/5/24. Postop pain controlled. CRP trending down, afebrile.  Underlying undiagnosed mental health disorder. Increased anxiety with hx learning disability.  Plan:   - Patient reports not feeling safe at [facility], requesting transport to hospital for further eval/treat of his knee infection. States he feels feverish, self-reports T102F and not feeling well. However, RN reports T97F. Reviewed recent labs, CRP trending down. Patient also reports \"the doctors in the hospital took the infection out of my knee and beetles are still attacking it.\"   - Patient sent to ED via non-emergent EMS  - Consider psych consult        Total time spent during today's visit was 51 mins including patient visit ([id number]; 31 minutes) and review of past records (15 minutes). Time also spent coordinating care with RN regarding patient status and patient request to be sent to ED (5 minutes).     Electronically " signed by: GUNNAR Wlash CNP

## 2024-11-14 ENCOUNTER — APPOINTMENT (OUTPATIENT)
Dept: GENERAL RADIOLOGY | Facility: CLINIC | Age: 54
End: 2024-11-14
Attending: EMERGENCY MEDICINE
Payer: COMMERCIAL

## 2024-11-14 ENCOUNTER — HOME INFUSION (OUTPATIENT)
Dept: HOME HEALTH SERVICES | Facility: HOME HEALTH | Age: 54
End: 2024-11-14
Payer: COMMERCIAL

## 2024-11-14 ENCOUNTER — HOME INFUSION BILLING (OUTPATIENT)
Dept: HOME HEALTH SERVICES | Facility: HOME HEALTH | Age: 54
End: 2024-11-14

## 2024-11-14 ENCOUNTER — TRANSITIONAL CARE UNIT VISIT (OUTPATIENT)
Dept: GERIATRICS | Facility: CLINIC | Age: 54
End: 2024-11-14
Payer: COMMERCIAL

## 2024-11-14 ENCOUNTER — HOSPITAL ENCOUNTER (EMERGENCY)
Facility: CLINIC | Age: 54
Discharge: LEFT BEFORE DISCHARGE | End: 2024-11-14
Attending: EMERGENCY MEDICINE | Admitting: EMERGENCY MEDICINE
Payer: COMMERCIAL

## 2024-11-14 VITALS
OXYGEN SATURATION: 96 % | TEMPERATURE: 98.6 F | HEART RATE: 95 BPM | WEIGHT: 253.97 LBS | HEIGHT: 75 IN | DIASTOLIC BLOOD PRESSURE: 72 MMHG | SYSTOLIC BLOOD PRESSURE: 143 MMHG | RESPIRATION RATE: 18 BRPM | BODY MASS INDEX: 31.58 KG/M2

## 2024-11-14 DIAGNOSIS — M00.061 STAPHYLOCOCCAL ARTHRITIS OF RIGHT KNEE (H): Primary | ICD-10-CM

## 2024-11-14 DIAGNOSIS — G89.18 POSTOPERATIVE PAIN: ICD-10-CM

## 2024-11-14 DIAGNOSIS — M00.9 PYOGENIC ARTHRITIS OF RIGHT KNEE JOINT, DUE TO UNSPECIFIED ORGANISM (H): ICD-10-CM

## 2024-11-14 DIAGNOSIS — M00.9: Primary | ICD-10-CM

## 2024-11-14 LAB
ALBUMIN UR-MCNC: NEGATIVE MG/DL
ANION GAP SERPL CALCULATED.3IONS-SCNC: 16 MMOL/L (ref 7–15)
APPEARANCE UR: CLEAR
BASOPHILS # BLD AUTO: 0.1 10E3/UL (ref 0–0.2)
BASOPHILS NFR BLD AUTO: 1 %
BILIRUB UR QL STRIP: NEGATIVE
BUN SERPL-MCNC: 11.5 MG/DL (ref 6–20)
CALCIUM SERPL-MCNC: 9.3 MG/DL (ref 8.8–10.4)
CHLORIDE SERPL-SCNC: 96 MMOL/L (ref 98–107)
COLOR UR AUTO: ABNORMAL
CREAT SERPL-MCNC: 0.71 MG/DL (ref 0.67–1.17)
EGFRCR SERPLBLD CKD-EPI 2021: >90 ML/MIN/1.73M2
EOSINOPHIL # BLD AUTO: 0.1 10E3/UL (ref 0–0.7)
EOSINOPHIL NFR BLD AUTO: 1 %
ERYTHROCYTE [DISTWIDTH] IN BLOOD BY AUTOMATED COUNT: 11.8 % (ref 10–15)
FLUAV RNA SPEC QL NAA+PROBE: NEGATIVE
FLUBV RNA RESP QL NAA+PROBE: NEGATIVE
GLUCOSE SERPL-MCNC: 213 MG/DL (ref 70–99)
GLUCOSE UR STRIP-MCNC: NEGATIVE MG/DL
HCO3 SERPL-SCNC: 21 MMOL/L (ref 22–29)
HCT VFR BLD AUTO: 40.5 % (ref 40–53)
HGB BLD-MCNC: 14.7 G/DL (ref 13.3–17.7)
HGB UR QL STRIP: NEGATIVE
HOLD SPECIMEN: NORMAL
HOLD SPECIMEN: NORMAL
IMM GRANULOCYTES # BLD: 0.1 10E3/UL
IMM GRANULOCYTES NFR BLD: 1 %
KETONES UR STRIP-MCNC: 10 MG/DL
LEUKOCYTE ESTERASE UR QL STRIP: NEGATIVE
LYMPHOCYTES # BLD AUTO: 2.1 10E3/UL (ref 0.8–5.3)
LYMPHOCYTES NFR BLD AUTO: 19 %
MCH RBC QN AUTO: 28.4 PG (ref 26.5–33)
MCHC RBC AUTO-ENTMCNC: 36.3 G/DL (ref 31.5–36.5)
MCV RBC AUTO: 78 FL (ref 78–100)
MONOCYTES # BLD AUTO: 0.6 10E3/UL (ref 0–1.3)
MONOCYTES NFR BLD AUTO: 5 %
NEUTROPHILS # BLD AUTO: 8.2 10E3/UL (ref 1.6–8.3)
NEUTROPHILS NFR BLD AUTO: 73 %
NITRATE UR QL: NEGATIVE
NRBC # BLD AUTO: 0 10E3/UL
NRBC BLD AUTO-RTO: 0 /100
PH UR STRIP: 5.5 [PH] (ref 5–7)
PLATELET # BLD AUTO: 310 10E3/UL (ref 150–450)
POTASSIUM SERPL-SCNC: 4.3 MMOL/L (ref 3.4–5.3)
RBC # BLD AUTO: 5.17 10E6/UL (ref 4.4–5.9)
RBC URINE: <1 /HPF
RSV RNA SPEC NAA+PROBE: NEGATIVE
SARS-COV-2 RNA RESP QL NAA+PROBE: NEGATIVE
SODIUM SERPL-SCNC: 133 MMOL/L (ref 135–145)
SP GR UR STRIP: 1.01 (ref 1–1.03)
UROBILINOGEN UR STRIP-MCNC: NORMAL MG/DL
WBC # BLD AUTO: 11.2 10E3/UL (ref 4–11)
WBC URINE: 1 /HPF

## 2024-11-14 PROCEDURE — 36415 COLL VENOUS BLD VENIPUNCTURE: CPT | Performed by: EMERGENCY MEDICINE

## 2024-11-14 PROCEDURE — 250N000011 HC RX IP 250 OP 636: Performed by: EMERGENCY MEDICINE

## 2024-11-14 PROCEDURE — 85014 HEMATOCRIT: CPT | Performed by: EMERGENCY MEDICINE

## 2024-11-14 PROCEDURE — 80048 BASIC METABOLIC PNL TOTAL CA: CPT | Performed by: EMERGENCY MEDICINE

## 2024-11-14 PROCEDURE — 82565 ASSAY OF CREATININE: CPT | Performed by: EMERGENCY MEDICINE

## 2024-11-14 PROCEDURE — 71046 X-RAY EXAM CHEST 2 VIEWS: CPT

## 2024-11-14 PROCEDURE — 99310 SBSQ NF CARE HIGH MDM 45: CPT | Performed by: NURSE PRACTITIONER

## 2024-11-14 PROCEDURE — 96365 THER/PROPH/DIAG IV INF INIT: CPT

## 2024-11-14 PROCEDURE — 81003 URINALYSIS AUTO W/O SCOPE: CPT | Performed by: EMERGENCY MEDICINE

## 2024-11-14 PROCEDURE — 87637 SARSCOV2&INF A&B&RSV AMP PRB: CPT | Performed by: EMERGENCY MEDICINE

## 2024-11-14 PROCEDURE — 99284 EMERGENCY DEPT VISIT MOD MDM: CPT | Mod: 25

## 2024-11-14 PROCEDURE — 85004 AUTOMATED DIFF WBC COUNT: CPT | Performed by: EMERGENCY MEDICINE

## 2024-11-14 RX ORDER — CEFTRIAXONE 2 G/1
2 INJECTION, POWDER, FOR SOLUTION INTRAMUSCULAR; INTRAVENOUS ONCE
Status: COMPLETED | OUTPATIENT
Start: 2024-11-14 | End: 2024-11-14

## 2024-11-14 RX ORDER — CEFDINIR 300 MG/1
300 CAPSULE ORAL 2 TIMES DAILY
Qty: 28 CAPSULE | Refills: 0 | Status: SHIPPED | OUTPATIENT
Start: 2024-11-14 | End: 2024-11-28

## 2024-11-14 RX ADMIN — CEFTRIAXONE 2 G: 2 INJECTION, POWDER, FOR SOLUTION INTRAMUSCULAR; INTRAVENOUS at 21:42

## 2024-11-14 ASSESSMENT — ACTIVITIES OF DAILY LIVING (ADL)
ADLS_ACUITY_SCORE: 0
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
## 2024-11-14 NOTE — ED TRIAGE NOTES
"Pt arrives via EMS from [facility]. Recently discharged there following surgery 11/05/24 for right knee septic arthritis. Has PICC line for IV ABX. Pt is concerned by the care he is receiving at the TCU, reports subjective fevers he feels was not treated at TCU. Pt also concerned he believes he only received half his medications on Tuesday night. Pt feels he is not improving at the same rate as when he was still at the hospital and \"doesn't feel right,\" reports aches and weakness and lack of appetite. A&Ox4.         "

## 2024-11-15 ENCOUNTER — INFUSION THERAPY VISIT (OUTPATIENT)
Dept: INFUSION THERAPY | Facility: CLINIC | Age: 54
End: 2024-11-15
Attending: INTERNAL MEDICINE
Payer: COMMERCIAL

## 2024-11-15 ENCOUNTER — MEDICAL CORRESPONDENCE (OUTPATIENT)
Dept: HEALTH INFORMATION MANAGEMENT | Facility: CLINIC | Age: 54
End: 2024-11-15

## 2024-11-15 VITALS
RESPIRATION RATE: 18 BRPM | TEMPERATURE: 98 F | SYSTOLIC BLOOD PRESSURE: 110 MMHG | HEART RATE: 100 BPM | OXYGEN SATURATION: 97 % | DIASTOLIC BLOOD PRESSURE: 73 MMHG

## 2024-11-15 DIAGNOSIS — T84.50XA INFECTION OR INFLAMMATORY REACTION DUE TO INTERNAL JOINT PROSTHESIS, INITIAL ENCOUNTER (H): Primary | ICD-10-CM

## 2024-11-15 PROCEDURE — 250N000011 HC RX IP 250 OP 636: Performed by: INTERNAL MEDICINE

## 2024-11-15 RX ORDER — MEPERIDINE HYDROCHLORIDE 25 MG/ML
25 INJECTION INTRAMUSCULAR; INTRAVENOUS; SUBCUTANEOUS
OUTPATIENT
Start: 2024-11-16

## 2024-11-15 RX ORDER — HEPARIN SODIUM,PORCINE 10 UNIT/ML
5-20 VIAL (ML) INTRAVENOUS DAILY PRN
OUTPATIENT
Start: 2024-11-16

## 2024-11-15 RX ORDER — DIPHENHYDRAMINE HYDROCHLORIDE 50 MG/ML
25 INJECTION INTRAMUSCULAR; INTRAVENOUS
Status: CANCELLED
Start: 2024-11-15

## 2024-11-15 RX ORDER — ALBUTEROL SULFATE 90 UG/1
1-2 INHALANT RESPIRATORY (INHALATION)
Start: 2024-11-16

## 2024-11-15 RX ORDER — METHYLPREDNISOLONE SODIUM SUCCINATE 40 MG/ML
40 INJECTION INTRAMUSCULAR; INTRAVENOUS
Status: CANCELLED
Start: 2024-11-15

## 2024-11-15 RX ORDER — HEPARIN SODIUM (PORCINE) LOCK FLUSH IV SOLN 100 UNIT/ML 100 UNIT/ML
5 SOLUTION INTRAVENOUS
Status: CANCELLED | OUTPATIENT
Start: 2024-11-15

## 2024-11-15 RX ORDER — EPINEPHRINE 1 MG/ML
0.3 INJECTION, SOLUTION INTRAMUSCULAR; SUBCUTANEOUS EVERY 5 MIN PRN
OUTPATIENT
Start: 2024-11-16

## 2024-11-15 RX ORDER — CEFTRIAXONE 2 G/1
2 INJECTION, POWDER, FOR SOLUTION INTRAMUSCULAR; INTRAVENOUS ONCE
Status: CANCELLED
Start: 2024-11-16 | End: 2024-11-16

## 2024-11-15 RX ORDER — HEPARIN SODIUM,PORCINE 10 UNIT/ML
5-20 VIAL (ML) INTRAVENOUS DAILY PRN
Status: CANCELLED | OUTPATIENT
Start: 2024-11-15

## 2024-11-15 RX ORDER — ALBUTEROL SULFATE 0.83 MG/ML
2.5 SOLUTION RESPIRATORY (INHALATION)
Status: CANCELLED | OUTPATIENT
Start: 2024-11-15

## 2024-11-15 RX ORDER — DIPHENHYDRAMINE HYDROCHLORIDE 50 MG/ML
25 INJECTION INTRAMUSCULAR; INTRAVENOUS
Start: 2024-11-16

## 2024-11-15 RX ORDER — HEPARIN SODIUM (PORCINE) LOCK FLUSH IV SOLN 100 UNIT/ML 100 UNIT/ML
5 SOLUTION INTRAVENOUS
OUTPATIENT
Start: 2024-11-16

## 2024-11-15 RX ORDER — DIPHENHYDRAMINE HYDROCHLORIDE 50 MG/ML
50 INJECTION INTRAMUSCULAR; INTRAVENOUS
Start: 2024-11-16

## 2024-11-15 RX ORDER — EPINEPHRINE 1 MG/ML
0.3 INJECTION, SOLUTION INTRAMUSCULAR; SUBCUTANEOUS EVERY 5 MIN PRN
Status: CANCELLED | OUTPATIENT
Start: 2024-11-15

## 2024-11-15 RX ORDER — ALBUTEROL SULFATE 90 UG/1
1-2 INHALANT RESPIRATORY (INHALATION)
Status: CANCELLED
Start: 2024-11-15

## 2024-11-15 RX ORDER — ALBUTEROL SULFATE 0.83 MG/ML
2.5 SOLUTION RESPIRATORY (INHALATION)
OUTPATIENT
Start: 2024-11-16

## 2024-11-15 RX ORDER — CEFTRIAXONE 2 G/1
2 INJECTION, POWDER, FOR SOLUTION INTRAMUSCULAR; INTRAVENOUS ONCE
Status: COMPLETED | OUTPATIENT
Start: 2024-11-15 | End: 2024-11-15

## 2024-11-15 RX ORDER — METHYLPREDNISOLONE SODIUM SUCCINATE 40 MG/ML
40 INJECTION INTRAMUSCULAR; INTRAVENOUS
Start: 2024-11-16

## 2024-11-15 RX ORDER — MEPERIDINE HYDROCHLORIDE 25 MG/ML
25 INJECTION INTRAMUSCULAR; INTRAVENOUS; SUBCUTANEOUS
Status: CANCELLED | OUTPATIENT
Start: 2024-11-15

## 2024-11-15 RX ORDER — DIPHENHYDRAMINE HYDROCHLORIDE 50 MG/ML
50 INJECTION INTRAMUSCULAR; INTRAVENOUS
Status: CANCELLED
Start: 2024-11-15

## 2024-11-15 RX ADMIN — CEFTRIAXONE SODIUM 2 G: 2 INJECTION, POWDER, FOR SOLUTION INTRAMUSCULAR; INTRAVENOUS at 15:43

## 2024-11-15 NOTE — PROGRESS NOTES
Infusion Nursing Note:  Scout Perez presents today for Rocephin.    Patient seen by provider today: No   present during visit today: Not Applicable.    Note: Patient reports feeling OK - denies fever/cough/chills/SOB/chest pain. Right knee surgery on 11/5. Plan to get daily Rocephin until 11/25 - orders faxed from Dr Love office. Scanned into epic.     Midline WDL, dressing change due 11/17.    Intravenous Access:  Midline.    Treatment Conditions:  Not Applicable.    Post Infusion Assessment:  Patient tolerated infusion without incident.  Blood return noted pre and post infusion.  Site patent and intact, free from redness, edema or discomfort.  No evidence of extravasations.     Discharge Plan:   Discharge instructions reviewed with: Patient.  Patient and/or family verbalized understanding of discharge instructions and all questions answered.  Copy of AVS reviewed with patient and/or family.  Patient will return to [facility] for next appointment.  Patient discharged in stable condition accompanied by: self.  Departure Mode: Ambulatory with walker.    Amilcar Joseph RN
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
## 2024-11-15 NOTE — DISCHARGE INSTRUCTIONS
Please call your orthopedic surgeon first thing tomorrow morning.    You need an additional 2 weeks of IV antibiotics.  It's my understanding that you do not want to stay in the emergency department to be placed in a facility that can provide IV antibiotics as an outpatient or be admitted.  Please be aware that without IV antibiotics, you are at high risk for developing recurrent right knee infection, bloodstream infection which can lead to recurrent surgeries, sepsis, bloodstream infections and even death.  I will place you on oral antibiotics as a replacement but this is not the recommended treatment plan.

## 2024-11-16 ENCOUNTER — INFUSION THERAPY VISIT (OUTPATIENT)
Dept: INFUSION THERAPY | Facility: CLINIC | Age: 54
End: 2024-11-16
Attending: FAMILY MEDICINE
Payer: COMMERCIAL

## 2024-11-16 VITALS
RESPIRATION RATE: 16 BRPM | DIASTOLIC BLOOD PRESSURE: 76 MMHG | SYSTOLIC BLOOD PRESSURE: 127 MMHG | OXYGEN SATURATION: 98 % | TEMPERATURE: 98 F | HEART RATE: 85 BPM

## 2024-11-16 DIAGNOSIS — T84.50XA INFECTION OR INFLAMMATORY REACTION DUE TO INTERNAL JOINT PROSTHESIS, INITIAL ENCOUNTER (H): Primary | ICD-10-CM

## 2024-11-16 PROCEDURE — 96374 THER/PROPH/DIAG INJ IV PUSH: CPT

## 2024-11-16 PROCEDURE — 250N000011 HC RX IP 250 OP 636: Performed by: INTERNAL MEDICINE

## 2024-11-16 RX ORDER — DIPHENHYDRAMINE HYDROCHLORIDE 50 MG/ML
50 INJECTION INTRAMUSCULAR; INTRAVENOUS
Status: CANCELLED
Start: 2024-11-17

## 2024-11-16 RX ORDER — CEFTRIAXONE 2 G/1
2 INJECTION, POWDER, FOR SOLUTION INTRAMUSCULAR; INTRAVENOUS ONCE
Status: COMPLETED | OUTPATIENT
Start: 2024-11-16 | End: 2024-11-16

## 2024-11-16 RX ORDER — CEFTRIAXONE 2 G/1
2 INJECTION, POWDER, FOR SOLUTION INTRAMUSCULAR; INTRAVENOUS ONCE
Status: CANCELLED
Start: 2024-11-17 | End: 2024-11-17

## 2024-11-16 RX ORDER — ALBUTEROL SULFATE 0.83 MG/ML
2.5 SOLUTION RESPIRATORY (INHALATION)
Status: CANCELLED | OUTPATIENT
Start: 2024-11-17

## 2024-11-16 RX ORDER — METHYLPREDNISOLONE SODIUM SUCCINATE 40 MG/ML
40 INJECTION INTRAMUSCULAR; INTRAVENOUS
Status: CANCELLED
Start: 2024-11-17

## 2024-11-16 RX ORDER — DIPHENHYDRAMINE HYDROCHLORIDE 50 MG/ML
25 INJECTION INTRAMUSCULAR; INTRAVENOUS
Status: CANCELLED
Start: 2024-11-17

## 2024-11-16 RX ORDER — ALBUTEROL SULFATE 90 UG/1
1-2 INHALANT RESPIRATORY (INHALATION)
Status: CANCELLED
Start: 2024-11-17

## 2024-11-16 RX ORDER — EPINEPHRINE 1 MG/ML
0.3 INJECTION, SOLUTION INTRAMUSCULAR; SUBCUTANEOUS EVERY 5 MIN PRN
Status: CANCELLED | OUTPATIENT
Start: 2024-11-17

## 2024-11-16 RX ORDER — MEPERIDINE HYDROCHLORIDE 25 MG/ML
25 INJECTION INTRAMUSCULAR; INTRAVENOUS; SUBCUTANEOUS
Status: CANCELLED | OUTPATIENT
Start: 2024-11-17

## 2024-11-16 RX ORDER — HEPARIN SODIUM (PORCINE) LOCK FLUSH IV SOLN 100 UNIT/ML 100 UNIT/ML
5 SOLUTION INTRAVENOUS
Status: CANCELLED | OUTPATIENT
Start: 2024-11-17

## 2024-11-16 RX ORDER — HEPARIN SODIUM,PORCINE 10 UNIT/ML
5-20 VIAL (ML) INTRAVENOUS DAILY PRN
Status: CANCELLED | OUTPATIENT
Start: 2024-11-17

## 2024-11-16 RX ADMIN — CEFTRIAXONE SODIUM 2 G: 2 INJECTION, POWDER, FOR SOLUTION INTRAMUSCULAR; INTRAVENOUS at 13:00

## 2024-11-16 ASSESSMENT — PAIN SCALES - GENERAL: PAINLEVEL_OUTOF10: MODERATE PAIN (5)

## 2024-11-16 NOTE — PROGRESS NOTES
Infusion Nursing Note:  Scout Perez presents today for rocephin.    Patient seen by provider today: No   present during visit today: Not Applicable.    Note: N/A.      Intravenous Access:  PICC.    Treatment Conditions:  Not Applicable.      Post Infusion Assessment:  Patient tolerated infusion without incident.  Site patent and intact, free from redness, edema or discomfort.  No evidence of extravasations.       Discharge Plan:   Patient declined prescription refills.  Discharge instructions reviewed with: Patient.  Patient and/or family verbalized understanding of discharge instructions and all questions answered.  AVS to patient via [employer].  Patient will return 11/17/24 for next appointment.   Patient discharged in stable condition accompanied by: self.  Departure Mode: Ambulatory.      Darlin Adams RN

## 2024-11-17 ENCOUNTER — INFUSION THERAPY VISIT (OUTPATIENT)
Dept: INFUSION THERAPY | Facility: CLINIC | Age: 54
End: 2024-11-17
Attending: INTERNAL MEDICINE
Payer: COMMERCIAL

## 2024-11-17 VITALS
RESPIRATION RATE: 16 BRPM | TEMPERATURE: 97.9 F | OXYGEN SATURATION: 97 % | DIASTOLIC BLOOD PRESSURE: 79 MMHG | SYSTOLIC BLOOD PRESSURE: 132 MMHG | HEART RATE: 85 BPM

## 2024-11-17 DIAGNOSIS — T84.50XA INFECTION OR INFLAMMATORY REACTION DUE TO INTERNAL JOINT PROSTHESIS, INITIAL ENCOUNTER (H): Primary | ICD-10-CM

## 2024-11-17 PROCEDURE — 250N000011 HC RX IP 250 OP 636: Performed by: INTERNAL MEDICINE

## 2024-11-17 PROCEDURE — 96374 THER/PROPH/DIAG INJ IV PUSH: CPT

## 2024-11-17 RX ORDER — MEPERIDINE HYDROCHLORIDE 25 MG/ML
25 INJECTION INTRAMUSCULAR; INTRAVENOUS; SUBCUTANEOUS
Status: CANCELLED | OUTPATIENT
Start: 2024-11-18

## 2024-11-17 RX ORDER — ALBUTEROL SULFATE 0.83 MG/ML
2.5 SOLUTION RESPIRATORY (INHALATION)
Status: CANCELLED | OUTPATIENT
Start: 2024-11-18

## 2024-11-17 RX ORDER — EPINEPHRINE 1 MG/ML
0.3 INJECTION, SOLUTION INTRAMUSCULAR; SUBCUTANEOUS EVERY 5 MIN PRN
Status: CANCELLED | OUTPATIENT
Start: 2024-11-18

## 2024-11-17 RX ORDER — CEFTRIAXONE 2 G/1
2 INJECTION, POWDER, FOR SOLUTION INTRAMUSCULAR; INTRAVENOUS ONCE
Status: COMPLETED | OUTPATIENT
Start: 2024-11-17 | End: 2024-11-17

## 2024-11-17 RX ORDER — ALBUTEROL SULFATE 90 UG/1
1-2 INHALANT RESPIRATORY (INHALATION)
Status: CANCELLED
Start: 2024-11-18

## 2024-11-17 RX ORDER — DIPHENHYDRAMINE HYDROCHLORIDE 50 MG/ML
25 INJECTION INTRAMUSCULAR; INTRAVENOUS
Status: CANCELLED
Start: 2024-11-18

## 2024-11-17 RX ORDER — HEPARIN SODIUM (PORCINE) LOCK FLUSH IV SOLN 100 UNIT/ML 100 UNIT/ML
5 SOLUTION INTRAVENOUS
Status: CANCELLED | OUTPATIENT
Start: 2024-11-18

## 2024-11-17 RX ORDER — DIPHENHYDRAMINE HYDROCHLORIDE 50 MG/ML
50 INJECTION INTRAMUSCULAR; INTRAVENOUS
Status: CANCELLED
Start: 2024-11-18

## 2024-11-17 RX ORDER — METHYLPREDNISOLONE SODIUM SUCCINATE 40 MG/ML
40 INJECTION INTRAMUSCULAR; INTRAVENOUS
Status: CANCELLED
Start: 2024-11-18

## 2024-11-17 RX ORDER — CEFTRIAXONE 2 G/1
2 INJECTION, POWDER, FOR SOLUTION INTRAMUSCULAR; INTRAVENOUS ONCE
Status: CANCELLED
Start: 2024-11-18 | End: 2024-11-18

## 2024-11-17 RX ORDER — HEPARIN SODIUM,PORCINE 10 UNIT/ML
5-20 VIAL (ML) INTRAVENOUS DAILY PRN
Status: CANCELLED | OUTPATIENT
Start: 2024-11-18

## 2024-11-17 RX ADMIN — CEFTRIAXONE SODIUM 2 G: 2 INJECTION, POWDER, FOR SOLUTION INTRAMUSCULAR; INTRAVENOUS at 12:57

## 2024-11-17 ASSESSMENT — PAIN SCALES - GENERAL: PAINLEVEL_OUTOF10: SEVERE PAIN (6)

## 2024-11-17 NOTE — PROGRESS NOTES
Infusion Nursing Note:  Scout Perez presents today for rocephin.    Patient seen by provider today: No   present during visit today: Not Applicable.    Note: Pt reports he needs more appts, here or [facility].        Intravenous Access:  Midline.    Treatment Conditions:  Not Applicable.      Post Infusion Assessment:  Patient tolerated infusion without incident.  Site patent and intact, free from redness, edema or discomfort.  No evidence of extravasations.       Discharge Plan:   Patient declined prescription refills.  Discharge instructions reviewed with: Patient.  Patient and/or family verbalized understanding of discharge instructions and all questions answered.  AVS to patient via [employer].  Patient will return 11/18/24 for next appointment.   Patient discharged in stable condition accompanied by: self.  Departure Mode: Ambulatory.      Darlin Adams RN

## 2024-11-18 ENCOUNTER — INFUSION THERAPY VISIT (OUTPATIENT)
Dept: INFUSION THERAPY | Facility: CLINIC | Age: 54
End: 2024-11-18
Attending: FAMILY MEDICINE
Payer: COMMERCIAL

## 2024-11-18 VITALS
HEART RATE: 82 BPM | DIASTOLIC BLOOD PRESSURE: 82 MMHG | TEMPERATURE: 97.9 F | OXYGEN SATURATION: 100 % | RESPIRATION RATE: 18 BRPM | SYSTOLIC BLOOD PRESSURE: 132 MMHG

## 2024-11-18 DIAGNOSIS — T84.50XA INFECTION OR INFLAMMATORY REACTION DUE TO INTERNAL JOINT PROSTHESIS, INITIAL ENCOUNTER (H): Primary | ICD-10-CM

## 2024-11-18 PROCEDURE — 96374 THER/PROPH/DIAG INJ IV PUSH: CPT

## 2024-11-18 PROCEDURE — 250N000011 HC RX IP 250 OP 636: Performed by: INTERNAL MEDICINE

## 2024-11-18 RX ORDER — ALBUTEROL SULFATE 0.83 MG/ML
2.5 SOLUTION RESPIRATORY (INHALATION)
Status: CANCELLED | OUTPATIENT
Start: 2024-11-19

## 2024-11-18 RX ORDER — DIPHENHYDRAMINE HYDROCHLORIDE 50 MG/ML
50 INJECTION INTRAMUSCULAR; INTRAVENOUS
Status: CANCELLED
Start: 2024-11-19

## 2024-11-18 RX ORDER — MEPERIDINE HYDROCHLORIDE 25 MG/ML
25 INJECTION INTRAMUSCULAR; INTRAVENOUS; SUBCUTANEOUS
Status: CANCELLED | OUTPATIENT
Start: 2024-11-19

## 2024-11-18 RX ORDER — EPINEPHRINE 1 MG/ML
0.3 INJECTION, SOLUTION INTRAMUSCULAR; SUBCUTANEOUS EVERY 5 MIN PRN
Status: CANCELLED | OUTPATIENT
Start: 2024-11-19

## 2024-11-18 RX ORDER — HEPARIN SODIUM,PORCINE 10 UNIT/ML
5-20 VIAL (ML) INTRAVENOUS DAILY PRN
Status: CANCELLED | OUTPATIENT
Start: 2024-11-19

## 2024-11-18 RX ORDER — CEFTRIAXONE 2 G/1
2 INJECTION, POWDER, FOR SOLUTION INTRAMUSCULAR; INTRAVENOUS ONCE
Status: COMPLETED | OUTPATIENT
Start: 2024-11-18 | End: 2024-11-18

## 2024-11-18 RX ORDER — CEFTRIAXONE 2 G/1
2 INJECTION, POWDER, FOR SOLUTION INTRAMUSCULAR; INTRAVENOUS ONCE
Status: CANCELLED
Start: 2024-11-19 | End: 2024-11-19

## 2024-11-18 RX ORDER — ALBUTEROL SULFATE 90 UG/1
1-2 INHALANT RESPIRATORY (INHALATION)
Status: CANCELLED
Start: 2024-11-19

## 2024-11-18 RX ORDER — DIPHENHYDRAMINE HYDROCHLORIDE 50 MG/ML
25 INJECTION INTRAMUSCULAR; INTRAVENOUS
Status: CANCELLED
Start: 2024-11-19

## 2024-11-18 RX ORDER — METHYLPREDNISOLONE SODIUM SUCCINATE 40 MG/ML
40 INJECTION INTRAMUSCULAR; INTRAVENOUS
Status: CANCELLED
Start: 2024-11-19

## 2024-11-18 RX ORDER — HEPARIN SODIUM (PORCINE) LOCK FLUSH IV SOLN 100 UNIT/ML 100 UNIT/ML
5 SOLUTION INTRAVENOUS
Status: CANCELLED | OUTPATIENT
Start: 2024-11-19

## 2024-11-18 RX ADMIN — CEFTRIAXONE SODIUM 2 G: 2 INJECTION, POWDER, FOR SOLUTION INTRAMUSCULAR; INTRAVENOUS at 16:08

## 2024-11-18 ASSESSMENT — PAIN SCALES - GENERAL: PAINLEVEL_OUTOF10: SEVERE PAIN (6)

## 2024-11-18 NOTE — PROGRESS NOTES
Infusion Nursing Note:  Scout Perez presents today for rocephin.    Patient seen by provider today: No   present during visit today: Not Applicable.    Note: Patient reports no new concerns since rocephin infusion yesterday.      Intravenous Access:  Midline. Dressing and cap changed.    Treatment Conditions:  Not Applicable.      Post Infusion Assessment:  Patient tolerated infusion without incident.  Blood return noted pre and post infusion.  Site patent and intact, free from redness, edema or discomfort.  No evidence of extravasations.       Discharge Plan:   Discharge instructions reviewed with: Patient.  Patient and/or family verbalized understanding of discharge instructions and all questions answered.  Copy of AVS reviewed with patient and/or family.  Patient will return 11/19/24 for next appointment.  Patient discharged in stable condition accompanied by: self.  Departure Mode: Ambulatory with walker.      Andressa Cisneros RN

## 2024-11-19 ENCOUNTER — INFUSION THERAPY VISIT (OUTPATIENT)
Dept: INFUSION THERAPY | Facility: CLINIC | Age: 54
End: 2024-11-19
Attending: INTERNAL MEDICINE
Payer: COMMERCIAL

## 2024-11-19 VITALS
SYSTOLIC BLOOD PRESSURE: 144 MMHG | OXYGEN SATURATION: 99 % | DIASTOLIC BLOOD PRESSURE: 81 MMHG | TEMPERATURE: 97.7 F | RESPIRATION RATE: 20 BRPM | HEART RATE: 82 BPM

## 2024-11-19 DIAGNOSIS — T84.50XA INFECTION OR INFLAMMATORY REACTION DUE TO INTERNAL JOINT PROSTHESIS, INITIAL ENCOUNTER (H): Primary | ICD-10-CM

## 2024-11-19 PROCEDURE — 250N000011 HC RX IP 250 OP 636: Performed by: INTERNAL MEDICINE

## 2024-11-19 PROCEDURE — 96374 THER/PROPH/DIAG INJ IV PUSH: CPT

## 2024-11-19 RX ORDER — HEPARIN SODIUM (PORCINE) LOCK FLUSH IV SOLN 100 UNIT/ML 100 UNIT/ML
5 SOLUTION INTRAVENOUS
Status: CANCELLED | OUTPATIENT
Start: 2024-11-20

## 2024-11-19 RX ORDER — ALBUTEROL SULFATE 0.83 MG/ML
2.5 SOLUTION RESPIRATORY (INHALATION)
Status: CANCELLED | OUTPATIENT
Start: 2024-11-20

## 2024-11-19 RX ORDER — ALBUTEROL SULFATE 90 UG/1
1-2 INHALANT RESPIRATORY (INHALATION)
Status: CANCELLED
Start: 2024-11-20

## 2024-11-19 RX ORDER — MEPERIDINE HYDROCHLORIDE 25 MG/ML
25 INJECTION INTRAMUSCULAR; INTRAVENOUS; SUBCUTANEOUS
Status: CANCELLED | OUTPATIENT
Start: 2024-11-20

## 2024-11-19 RX ORDER — CEFTRIAXONE 2 G/1
2 INJECTION, POWDER, FOR SOLUTION INTRAMUSCULAR; INTRAVENOUS ONCE
Status: COMPLETED | OUTPATIENT
Start: 2024-11-19 | End: 2024-11-19

## 2024-11-19 RX ORDER — EPINEPHRINE 1 MG/ML
0.3 INJECTION, SOLUTION INTRAMUSCULAR; SUBCUTANEOUS EVERY 5 MIN PRN
Status: CANCELLED | OUTPATIENT
Start: 2024-11-20

## 2024-11-19 RX ORDER — DIPHENHYDRAMINE HYDROCHLORIDE 50 MG/ML
25 INJECTION INTRAMUSCULAR; INTRAVENOUS
Status: CANCELLED
Start: 2024-11-20

## 2024-11-19 RX ORDER — HEPARIN SODIUM,PORCINE 10 UNIT/ML
5-20 VIAL (ML) INTRAVENOUS DAILY PRN
Status: CANCELLED | OUTPATIENT
Start: 2024-11-20

## 2024-11-19 RX ORDER — DIPHENHYDRAMINE HYDROCHLORIDE 50 MG/ML
50 INJECTION INTRAMUSCULAR; INTRAVENOUS
Status: CANCELLED
Start: 2024-11-20

## 2024-11-19 RX ORDER — CEFTRIAXONE 2 G/1
2 INJECTION, POWDER, FOR SOLUTION INTRAMUSCULAR; INTRAVENOUS ONCE
Status: CANCELLED
Start: 2024-11-20 | End: 2024-11-20

## 2024-11-19 RX ORDER — METHYLPREDNISOLONE SODIUM SUCCINATE 40 MG/ML
40 INJECTION INTRAMUSCULAR; INTRAVENOUS
Status: CANCELLED
Start: 2024-11-20

## 2024-11-19 RX ADMIN — CEFTRIAXONE SODIUM 2 G: 2 INJECTION, POWDER, FOR SOLUTION INTRAMUSCULAR; INTRAVENOUS at 15:55

## 2024-11-19 NOTE — PROGRESS NOTES
Infusion Nursing Note:  Scout Perez presents today for Rocephin.    Patient seen by provider today: No   present during visit today: Not Applicable.    Note: N/A.      Intravenous Access:  Midline.    Treatment Conditions:  Not Applicable.      Post Infusion Assessment:  Patient tolerated infusion without incident.  Blood return noted pre and post infusion.  Site patent and intact, free from redness, edema or discomfort.  No evidence of extravasations.       Discharge Plan:   Patient declined prescription refills.  Discharge instructions reviewed with: Patient.  Patient verbalized understanding of discharge instructions and all questions answered.  AVS to patient via [employer].  Patient will return 11/20/24 for next appointment.   Patient discharged in stable condition accompanied by: self.  Departure Mode: Ambulatory.      Taryn Salcedo RN

## 2024-11-20 ENCOUNTER — INFUSION THERAPY VISIT (OUTPATIENT)
Dept: INFUSION THERAPY | Facility: CLINIC | Age: 54
End: 2024-11-20
Attending: INTERNAL MEDICINE
Payer: COMMERCIAL

## 2024-11-20 VITALS
OXYGEN SATURATION: 100 % | TEMPERATURE: 97.7 F | DIASTOLIC BLOOD PRESSURE: 85 MMHG | HEART RATE: 73 BPM | SYSTOLIC BLOOD PRESSURE: 144 MMHG | RESPIRATION RATE: 16 BRPM

## 2024-11-20 DIAGNOSIS — T84.50XA INFECTION OR INFLAMMATORY REACTION DUE TO INTERNAL JOINT PROSTHESIS, INITIAL ENCOUNTER (H): Primary | ICD-10-CM

## 2024-11-20 PROCEDURE — 250N000011 HC RX IP 250 OP 636: Performed by: INTERNAL MEDICINE

## 2024-11-20 PROCEDURE — 96374 THER/PROPH/DIAG INJ IV PUSH: CPT

## 2024-11-20 RX ORDER — MEPERIDINE HYDROCHLORIDE 25 MG/ML
25 INJECTION INTRAMUSCULAR; INTRAVENOUS; SUBCUTANEOUS
Status: CANCELLED | OUTPATIENT
Start: 2024-11-21

## 2024-11-20 RX ORDER — HEPARIN SODIUM (PORCINE) LOCK FLUSH IV SOLN 100 UNIT/ML 100 UNIT/ML
5 SOLUTION INTRAVENOUS
Status: CANCELLED | OUTPATIENT
Start: 2024-11-21

## 2024-11-20 RX ORDER — ALBUTEROL SULFATE 90 UG/1
1-2 INHALANT RESPIRATORY (INHALATION)
Status: CANCELLED
Start: 2024-11-21

## 2024-11-20 RX ORDER — CEFTRIAXONE 2 G/1
2 INJECTION, POWDER, FOR SOLUTION INTRAMUSCULAR; INTRAVENOUS ONCE
Status: CANCELLED
Start: 2024-11-21 | End: 2024-11-21

## 2024-11-20 RX ORDER — CEFTRIAXONE 2 G/1
2 INJECTION, POWDER, FOR SOLUTION INTRAMUSCULAR; INTRAVENOUS ONCE
Status: COMPLETED | OUTPATIENT
Start: 2024-11-20 | End: 2024-11-20

## 2024-11-20 RX ORDER — EPINEPHRINE 1 MG/ML
0.3 INJECTION, SOLUTION INTRAMUSCULAR; SUBCUTANEOUS EVERY 5 MIN PRN
Status: CANCELLED | OUTPATIENT
Start: 2024-11-21

## 2024-11-20 RX ORDER — ALBUTEROL SULFATE 0.83 MG/ML
2.5 SOLUTION RESPIRATORY (INHALATION)
Status: CANCELLED | OUTPATIENT
Start: 2024-11-21

## 2024-11-20 RX ORDER — DIPHENHYDRAMINE HYDROCHLORIDE 50 MG/ML
50 INJECTION INTRAMUSCULAR; INTRAVENOUS
Status: CANCELLED
Start: 2024-11-21

## 2024-11-20 RX ORDER — METHYLPREDNISOLONE SODIUM SUCCINATE 40 MG/ML
40 INJECTION INTRAMUSCULAR; INTRAVENOUS
Status: CANCELLED
Start: 2024-11-21

## 2024-11-20 RX ORDER — DIPHENHYDRAMINE HYDROCHLORIDE 50 MG/ML
25 INJECTION INTRAMUSCULAR; INTRAVENOUS
Status: CANCELLED
Start: 2024-11-21

## 2024-11-20 RX ORDER — HEPARIN SODIUM,PORCINE 10 UNIT/ML
5-20 VIAL (ML) INTRAVENOUS DAILY PRN
Status: CANCELLED | OUTPATIENT
Start: 2024-11-21

## 2024-11-20 RX ADMIN — CEFTRIAXONE SODIUM 2 G: 2 INJECTION, POWDER, FOR SOLUTION INTRAMUSCULAR; INTRAVENOUS at 16:22

## 2024-11-20 NOTE — PROGRESS NOTES
"Infusion Nursing Note:  Scout Perez presents today for Rocephin IVP.    Patient seen by provider today: No   present during visit today: Not Applicable.    Note: Pt reports increased fatigue and says that he has not been sleeping well.  Pt also reports he feels \"hot\" and that he was \"running a temperature of 107 again last night\".  Pt is vitally stable, afebrile, appears well and is steady and conversational.  Pt was counseled to check in with PCP if he is not feeling better after some rest.        Intravenous Access:  Midline.    Treatment Conditions:  Not Applicable.      Post Infusion Assessment:  Patient tolerated injection without incident.  Site patent and intact, free from redness, edema or discomfort.  No evidence of extravasations.       Discharge Plan:   [facility]  Patient will return 11/21/24 for next appointment.   Patient discharged in stable condition accompanied by: self.  Departure Mode: Ambulatory.      No Jackman RN    "

## 2024-11-21 ENCOUNTER — INFUSION THERAPY VISIT (OUTPATIENT)
Dept: INFUSION THERAPY | Facility: CLINIC | Age: 54
End: 2024-11-21
Attending: INTERNAL MEDICINE
Payer: COMMERCIAL

## 2024-11-21 VITALS
TEMPERATURE: 97.7 F | HEART RATE: 90 BPM | SYSTOLIC BLOOD PRESSURE: 131 MMHG | RESPIRATION RATE: 18 BRPM | DIASTOLIC BLOOD PRESSURE: 76 MMHG | OXYGEN SATURATION: 99 %

## 2024-11-21 DIAGNOSIS — T84.50XA INFECTION OR INFLAMMATORY REACTION DUE TO INTERNAL JOINT PROSTHESIS, INITIAL ENCOUNTER (H): Primary | ICD-10-CM

## 2024-11-21 PROCEDURE — 250N000011 HC RX IP 250 OP 636: Performed by: INTERNAL MEDICINE

## 2024-11-21 RX ORDER — HEPARIN SODIUM,PORCINE 10 UNIT/ML
5-20 VIAL (ML) INTRAVENOUS DAILY PRN
OUTPATIENT
Start: 2024-11-22

## 2024-11-21 RX ORDER — CEFTRIAXONE 2 G/1
2 INJECTION, POWDER, FOR SOLUTION INTRAMUSCULAR; INTRAVENOUS ONCE
Status: COMPLETED | OUTPATIENT
Start: 2024-11-21 | End: 2024-11-21

## 2024-11-21 RX ORDER — CEFTRIAXONE 2 G/1
2 INJECTION, POWDER, FOR SOLUTION INTRAMUSCULAR; INTRAVENOUS ONCE
Start: 2024-11-22 | End: 2024-11-22

## 2024-11-21 RX ORDER — METHYLPREDNISOLONE SODIUM SUCCINATE 40 MG/ML
40 INJECTION INTRAMUSCULAR; INTRAVENOUS
Start: 2024-11-22

## 2024-11-21 RX ORDER — DIPHENHYDRAMINE HYDROCHLORIDE 50 MG/ML
50 INJECTION INTRAMUSCULAR; INTRAVENOUS
Start: 2024-11-22

## 2024-11-21 RX ORDER — EPINEPHRINE 1 MG/ML
0.3 INJECTION, SOLUTION INTRAMUSCULAR; SUBCUTANEOUS EVERY 5 MIN PRN
OUTPATIENT
Start: 2024-11-22

## 2024-11-21 RX ORDER — HEPARIN SODIUM (PORCINE) LOCK FLUSH IV SOLN 100 UNIT/ML 100 UNIT/ML
5 SOLUTION INTRAVENOUS
Status: DISCONTINUED | OUTPATIENT
Start: 2024-11-21 | End: 2024-11-21 | Stop reason: HOSPADM

## 2024-11-21 RX ORDER — MEPERIDINE HYDROCHLORIDE 25 MG/ML
25 INJECTION INTRAMUSCULAR; INTRAVENOUS; SUBCUTANEOUS
OUTPATIENT
Start: 2024-11-22

## 2024-11-21 RX ORDER — HEPARIN SODIUM (PORCINE) LOCK FLUSH IV SOLN 100 UNIT/ML 100 UNIT/ML
5 SOLUTION INTRAVENOUS
OUTPATIENT
Start: 2024-11-22

## 2024-11-21 RX ORDER — DIPHENHYDRAMINE HYDROCHLORIDE 50 MG/ML
25 INJECTION INTRAMUSCULAR; INTRAVENOUS
Start: 2024-11-22

## 2024-11-21 RX ORDER — ALBUTEROL SULFATE 90 UG/1
1-2 INHALANT RESPIRATORY (INHALATION)
Start: 2024-11-22

## 2024-11-21 RX ORDER — HEPARIN SODIUM,PORCINE 10 UNIT/ML
5-20 VIAL (ML) INTRAVENOUS DAILY PRN
Status: DISCONTINUED | OUTPATIENT
Start: 2024-11-21 | End: 2024-11-21 | Stop reason: HOSPADM

## 2024-11-21 RX ORDER — ALBUTEROL SULFATE 0.83 MG/ML
2.5 SOLUTION RESPIRATORY (INHALATION)
OUTPATIENT
Start: 2024-11-22

## 2024-11-21 RX ADMIN — CEFTRIAXONE SODIUM 2 G: 2 INJECTION, POWDER, FOR SOLUTION INTRAMUSCULAR; INTRAVENOUS at 16:00

## 2024-11-21 NOTE — PROGRESS NOTES
Infusion Nursing Note:  Scout Perez presents today for rocephin.    Patient seen by provider today: No   present during visit today: Not Applicable.    Note: N/A.      Intravenous Access:  Midline.    Treatment Conditions:  Not Applicable.      Post Infusion Assessment:  Patient tolerated infusion without incident.  Blood return noted pre and post infusion.  Site patent and intact, free from redness, edema or discomfort.  No evidence of extravasations.       Discharge Plan:   Discharge instructions reviewed with: Patient.  Patient and/or family verbalized understanding of discharge instructions and all questions answered.  Patient discharged in stable condition accompanied by: self.  Departure Mode: Ambulatory.      Thalia Pearson RN

## 2024-11-23 ENCOUNTER — INFUSION THERAPY VISIT (OUTPATIENT)
Dept: INFUSION THERAPY | Facility: CLINIC | Age: 54
End: 2024-11-23
Attending: FAMILY MEDICINE
Payer: COMMERCIAL

## 2024-11-23 VITALS
DIASTOLIC BLOOD PRESSURE: 76 MMHG | OXYGEN SATURATION: 97 % | HEART RATE: 77 BPM | SYSTOLIC BLOOD PRESSURE: 117 MMHG | RESPIRATION RATE: 18 BRPM | TEMPERATURE: 97.8 F

## 2024-11-23 DIAGNOSIS — T84.50XA INFECTION OR INFLAMMATORY REACTION DUE TO INTERNAL JOINT PROSTHESIS, INITIAL ENCOUNTER (H): Primary | ICD-10-CM

## 2024-11-23 PROCEDURE — 96374 THER/PROPH/DIAG INJ IV PUSH: CPT

## 2024-11-23 PROCEDURE — 250N000011 HC RX IP 250 OP 636: Performed by: INTERNAL MEDICINE

## 2024-11-23 RX ORDER — HEPARIN SODIUM,PORCINE 10 UNIT/ML
5-20 VIAL (ML) INTRAVENOUS DAILY PRN
Status: CANCELLED | OUTPATIENT
Start: 2024-11-24

## 2024-11-23 RX ORDER — CEFTRIAXONE 2 G/1
2 INJECTION, POWDER, FOR SOLUTION INTRAMUSCULAR; INTRAVENOUS ONCE
Status: COMPLETED | OUTPATIENT
Start: 2024-11-23 | End: 2024-11-23

## 2024-11-23 RX ORDER — HEPARIN SODIUM (PORCINE) LOCK FLUSH IV SOLN 100 UNIT/ML 100 UNIT/ML
5 SOLUTION INTRAVENOUS
Status: CANCELLED | OUTPATIENT
Start: 2024-11-24

## 2024-11-23 RX ORDER — METHYLPREDNISOLONE SODIUM SUCCINATE 40 MG/ML
40 INJECTION INTRAMUSCULAR; INTRAVENOUS
Status: CANCELLED
Start: 2024-11-24

## 2024-11-23 RX ORDER — CEFTRIAXONE 2 G/1
2 INJECTION, POWDER, FOR SOLUTION INTRAMUSCULAR; INTRAVENOUS ONCE
Status: CANCELLED
Start: 2024-11-24 | End: 2024-11-24

## 2024-11-23 RX ORDER — ALBUTEROL SULFATE 0.83 MG/ML
2.5 SOLUTION RESPIRATORY (INHALATION)
Status: CANCELLED | OUTPATIENT
Start: 2024-11-24

## 2024-11-23 RX ORDER — ALBUTEROL SULFATE 90 UG/1
1-2 INHALANT RESPIRATORY (INHALATION)
Status: CANCELLED
Start: 2024-11-24

## 2024-11-23 RX ORDER — MEPERIDINE HYDROCHLORIDE 25 MG/ML
25 INJECTION INTRAMUSCULAR; INTRAVENOUS; SUBCUTANEOUS
Status: CANCELLED | OUTPATIENT
Start: 2024-11-24

## 2024-11-23 RX ORDER — EPINEPHRINE 1 MG/ML
0.3 INJECTION, SOLUTION INTRAMUSCULAR; SUBCUTANEOUS EVERY 5 MIN PRN
Status: CANCELLED | OUTPATIENT
Start: 2024-11-24

## 2024-11-23 RX ORDER — DIPHENHYDRAMINE HYDROCHLORIDE 50 MG/ML
25 INJECTION INTRAMUSCULAR; INTRAVENOUS
Status: CANCELLED
Start: 2024-11-24

## 2024-11-23 RX ORDER — DIPHENHYDRAMINE HYDROCHLORIDE 50 MG/ML
50 INJECTION INTRAMUSCULAR; INTRAVENOUS
Status: CANCELLED
Start: 2024-11-24

## 2024-11-23 RX ADMIN — CEFTRIAXONE SODIUM 2 G: 2 INJECTION, POWDER, FOR SOLUTION INTRAMUSCULAR; INTRAVENOUS at 11:04

## 2024-11-23 ASSESSMENT — PAIN SCALES - GENERAL: PAINLEVEL_OUTOF10: WORST PAIN (10)

## 2024-11-23 NOTE — PROGRESS NOTES
Infusion Nursing Note:  Scout Perez presents today for rocephin.    Patient seen by provider today: No   present during visit today: Not Applicable.    Note: Pt reports to having severe pain 10/10 all over. He took extra strength Tylenol at 8am but did not help much.       Intravenous Access:  Midline.    Treatment Conditions:  Not Applicable.      Post Infusion Assessment:  Patient tolerated infusion without incident.  Blood return noted pre and post infusion.  Site patent and intact, free from redness, edema or discomfort.       Discharge Plan:   Discharge instructions reviewed with: Patient.  Patient and/or family verbalized understanding of discharge instructions and all questions answered.  AVS to patient via [employer].  Patient will return tomorrow for next appointment.   Patient discharged in stable condition accompanied by: self.  Departure Mode: Ambulatory with cane.      Mario Alberto Turner RN

## 2024-11-24 ENCOUNTER — INFUSION THERAPY VISIT (OUTPATIENT)
Dept: INFUSION THERAPY | Facility: CLINIC | Age: 54
End: 2024-11-24
Attending: FAMILY MEDICINE
Payer: COMMERCIAL

## 2024-11-24 VITALS
TEMPERATURE: 97.9 F | SYSTOLIC BLOOD PRESSURE: 131 MMHG | RESPIRATION RATE: 18 BRPM | OXYGEN SATURATION: 99 % | DIASTOLIC BLOOD PRESSURE: 88 MMHG | HEART RATE: 75 BPM

## 2024-11-24 DIAGNOSIS — T84.50XA INFECTION OR INFLAMMATORY REACTION DUE TO INTERNAL JOINT PROSTHESIS, INITIAL ENCOUNTER (H): Primary | ICD-10-CM

## 2024-11-24 PROCEDURE — 250N000011 HC RX IP 250 OP 636: Performed by: INTERNAL MEDICINE

## 2024-11-24 PROCEDURE — 96374 THER/PROPH/DIAG INJ IV PUSH: CPT

## 2024-11-24 RX ORDER — ALBUTEROL SULFATE 90 UG/1
1-2 INHALANT RESPIRATORY (INHALATION)
Status: CANCELLED
Start: 2024-11-25

## 2024-11-24 RX ORDER — DIPHENHYDRAMINE HYDROCHLORIDE 50 MG/ML
25 INJECTION INTRAMUSCULAR; INTRAVENOUS
Status: CANCELLED
Start: 2024-11-25

## 2024-11-24 RX ORDER — CEFTRIAXONE 2 G/1
2 INJECTION, POWDER, FOR SOLUTION INTRAMUSCULAR; INTRAVENOUS ONCE
Status: CANCELLED
Start: 2024-11-25 | End: 2024-11-25

## 2024-11-24 RX ORDER — HEPARIN SODIUM,PORCINE 10 UNIT/ML
5-20 VIAL (ML) INTRAVENOUS DAILY PRN
Status: CANCELLED | OUTPATIENT
Start: 2024-11-25

## 2024-11-24 RX ORDER — DIPHENHYDRAMINE HYDROCHLORIDE 50 MG/ML
50 INJECTION INTRAMUSCULAR; INTRAVENOUS
Status: CANCELLED
Start: 2024-11-25

## 2024-11-24 RX ORDER — MEPERIDINE HYDROCHLORIDE 25 MG/ML
25 INJECTION INTRAMUSCULAR; INTRAVENOUS; SUBCUTANEOUS
Status: CANCELLED | OUTPATIENT
Start: 2024-11-25

## 2024-11-24 RX ORDER — HEPARIN SODIUM (PORCINE) LOCK FLUSH IV SOLN 100 UNIT/ML 100 UNIT/ML
5 SOLUTION INTRAVENOUS
Status: CANCELLED | OUTPATIENT
Start: 2024-11-25

## 2024-11-24 RX ORDER — ALBUTEROL SULFATE 0.83 MG/ML
2.5 SOLUTION RESPIRATORY (INHALATION)
Status: CANCELLED | OUTPATIENT
Start: 2024-11-25

## 2024-11-24 RX ORDER — METHYLPREDNISOLONE SODIUM SUCCINATE 40 MG/ML
40 INJECTION INTRAMUSCULAR; INTRAVENOUS
Status: CANCELLED
Start: 2024-11-25

## 2024-11-24 RX ORDER — EPINEPHRINE 1 MG/ML
0.3 INJECTION, SOLUTION INTRAMUSCULAR; SUBCUTANEOUS EVERY 5 MIN PRN
Status: CANCELLED | OUTPATIENT
Start: 2024-11-25

## 2024-11-24 RX ORDER — CEFTRIAXONE 2 G/1
2 INJECTION, POWDER, FOR SOLUTION INTRAMUSCULAR; INTRAVENOUS ONCE
Status: COMPLETED | OUTPATIENT
Start: 2024-11-24 | End: 2024-11-24

## 2024-11-24 RX ADMIN — CEFTRIAXONE SODIUM 2 G: 2 INJECTION, POWDER, FOR SOLUTION INTRAMUSCULAR; INTRAVENOUS at 11:02

## 2024-11-24 NOTE — PROGRESS NOTES
Infusion Nursing Note:  Scout Perez presents today for Rocephin.    Patient seen by provider today: No   present during visit today: Not Applicable.    Note: N/A.      Intravenous Access:  PICC.    Treatment Conditions:  Not Applicable.      Post Infusion Assessment:  Patient tolerated infusion without incident.  Blood return noted pre and post infusion.  Site patent and intact, free from redness, edema or discomfort.  No evidence of extravasations.       Discharge Plan:   Patient declined prescription refills.  Discharge instructions reviewed with: Patient.  Patient and/or family verbalized understanding of discharge instructions and all questions answered.  AVS to patient via [employer].  Patient will return 11/25 for next appointment.   Patient discharged in stable condition accompanied by: self.  Departure Mode: Ambulatory.      Rah Baker RN

## 2024-11-25 ENCOUNTER — INFUSION THERAPY VISIT (OUTPATIENT)
Dept: INFUSION THERAPY | Facility: CLINIC | Age: 54
End: 2024-11-25
Attending: FAMILY MEDICINE
Payer: COMMERCIAL

## 2024-11-25 VITALS
RESPIRATION RATE: 20 BRPM | SYSTOLIC BLOOD PRESSURE: 128 MMHG | HEART RATE: 61 BPM | DIASTOLIC BLOOD PRESSURE: 83 MMHG | OXYGEN SATURATION: 100 % | TEMPERATURE: 97.9 F

## 2024-11-25 DIAGNOSIS — T84.50XA INFECTION OR INFLAMMATORY REACTION DUE TO INTERNAL JOINT PROSTHESIS, INITIAL ENCOUNTER (H): Primary | ICD-10-CM

## 2024-11-25 LAB
AST SERPL W P-5'-P-CCNC: 28 U/L (ref 0–45)
BASOPHILS # BLD AUTO: 0.1 10E3/UL (ref 0–0.2)
BASOPHILS NFR BLD AUTO: 2 %
CREAT SERPL-MCNC: 0.66 MG/DL (ref 0.67–1.17)
CRP SERPL-MCNC: <3 MG/L
EGFRCR SERPLBLD CKD-EPI 2021: >90 ML/MIN/1.73M2
EOSINOPHIL # BLD AUTO: 0.4 10E3/UL (ref 0–0.7)
EOSINOPHIL NFR BLD AUTO: 5 %
ERYTHROCYTE [DISTWIDTH] IN BLOOD BY AUTOMATED COUNT: 12.5 % (ref 10–15)
ERYTHROCYTE [SEDIMENTATION RATE] IN BLOOD BY WESTERGREN METHOD: 11 MM/HR (ref 0–20)
HCT VFR BLD AUTO: 42.1 % (ref 40–53)
HGB BLD-MCNC: 14.9 G/DL (ref 13.3–17.7)
IMM GRANULOCYTES # BLD: 0 10E3/UL
IMM GRANULOCYTES NFR BLD: 1 %
LYMPHOCYTES # BLD AUTO: 2.6 10E3/UL (ref 0.8–5.3)
LYMPHOCYTES NFR BLD AUTO: 32 %
MCH RBC QN AUTO: 28.6 PG (ref 26.5–33)
MCHC RBC AUTO-ENTMCNC: 35.4 G/DL (ref 31.5–36.5)
MCV RBC AUTO: 81 FL (ref 78–100)
MONOCYTES # BLD AUTO: 0.5 10E3/UL (ref 0–1.3)
MONOCYTES NFR BLD AUTO: 5 %
NEUTROPHILS # BLD AUTO: 4.7 10E3/UL (ref 1.6–8.3)
NEUTROPHILS NFR BLD AUTO: 56 %
NRBC # BLD AUTO: 0 10E3/UL
NRBC BLD AUTO-RTO: 0 /100
PLATELET # BLD AUTO: 304 10E3/UL (ref 150–450)
RBC # BLD AUTO: 5.21 10E6/UL (ref 4.4–5.9)
WBC # BLD AUTO: 8.3 10E3/UL (ref 4–11)

## 2024-11-25 PROCEDURE — 96374 THER/PROPH/DIAG INJ IV PUSH: CPT

## 2024-11-25 PROCEDURE — 85652 RBC SED RATE AUTOMATED: CPT | Performed by: INTERNAL MEDICINE

## 2024-11-25 PROCEDURE — 85004 AUTOMATED DIFF WBC COUNT: CPT | Performed by: INTERNAL MEDICINE

## 2024-11-25 PROCEDURE — 85014 HEMATOCRIT: CPT | Performed by: INTERNAL MEDICINE

## 2024-11-25 PROCEDURE — 84450 TRANSFERASE (AST) (SGOT): CPT | Performed by: INTERNAL MEDICINE

## 2024-11-25 PROCEDURE — 36415 COLL VENOUS BLD VENIPUNCTURE: CPT | Performed by: INTERNAL MEDICINE

## 2024-11-25 PROCEDURE — 86140 C-REACTIVE PROTEIN: CPT | Performed by: INTERNAL MEDICINE

## 2024-11-25 PROCEDURE — 250N000011 HC RX IP 250 OP 636: Performed by: INTERNAL MEDICINE

## 2024-11-25 PROCEDURE — 82565 ASSAY OF CREATININE: CPT | Performed by: INTERNAL MEDICINE

## 2024-11-25 RX ORDER — CEFTRIAXONE 2 G/1
2 INJECTION, POWDER, FOR SOLUTION INTRAMUSCULAR; INTRAVENOUS ONCE
Status: CANCELLED
Start: 2024-11-25 | End: 2024-11-25

## 2024-11-25 RX ORDER — DIPHENHYDRAMINE HYDROCHLORIDE 50 MG/ML
25 INJECTION INTRAMUSCULAR; INTRAVENOUS
Start: 2024-11-25

## 2024-11-25 RX ORDER — METHYLPREDNISOLONE SODIUM SUCCINATE 40 MG/ML
40 INJECTION INTRAMUSCULAR; INTRAVENOUS
Start: 2024-11-25

## 2024-11-25 RX ORDER — HEPARIN SODIUM (PORCINE) LOCK FLUSH IV SOLN 100 UNIT/ML 100 UNIT/ML
5 SOLUTION INTRAVENOUS
OUTPATIENT
Start: 2024-11-25

## 2024-11-25 RX ORDER — EPINEPHRINE 1 MG/ML
0.3 INJECTION, SOLUTION INTRAMUSCULAR; SUBCUTANEOUS EVERY 5 MIN PRN
OUTPATIENT
Start: 2024-11-25

## 2024-11-25 RX ORDER — MEPERIDINE HYDROCHLORIDE 25 MG/ML
25 INJECTION INTRAMUSCULAR; INTRAVENOUS; SUBCUTANEOUS
OUTPATIENT
Start: 2024-11-25

## 2024-11-25 RX ORDER — CEFTRIAXONE 2 G/1
2 INJECTION, POWDER, FOR SOLUTION INTRAMUSCULAR; INTRAVENOUS ONCE
Status: COMPLETED | OUTPATIENT
Start: 2024-11-25 | End: 2024-11-25

## 2024-11-25 RX ORDER — ALBUTEROL SULFATE 0.83 MG/ML
2.5 SOLUTION RESPIRATORY (INHALATION)
OUTPATIENT
Start: 2024-11-25

## 2024-11-25 RX ORDER — ALBUTEROL SULFATE 90 UG/1
1-2 INHALANT RESPIRATORY (INHALATION)
Start: 2024-11-25

## 2024-11-25 RX ORDER — HEPARIN SODIUM,PORCINE 10 UNIT/ML
5-20 VIAL (ML) INTRAVENOUS DAILY PRN
OUTPATIENT
Start: 2024-11-25

## 2024-11-25 RX ORDER — DIPHENHYDRAMINE HYDROCHLORIDE 50 MG/ML
50 INJECTION INTRAMUSCULAR; INTRAVENOUS
Start: 2024-11-25

## 2024-11-25 RX ADMIN — CEFTRIAXONE SODIUM 2 G: 2 INJECTION, POWDER, FOR SOLUTION INTRAMUSCULAR; INTRAVENOUS at 15:42

## 2024-11-25 ASSESSMENT — PAIN SCALES - GENERAL: PAINLEVEL_OUTOF10: MODERATE PAIN (5)

## 2024-11-25 NOTE — PROGRESS NOTES
Infusion Nursing Note:  Scout Perez presents today for Rocephin.    Patient seen by provider today: No   present during visit today: Not Applicable.    Note: Orders given to draw labs today and leave Midline in place. Clinic will contact patient tomorrow with further instruction. Dressing noted to be C/D/I and was not changed today due to anticipating line might be pulled tomorrow 11/26. If not, patient will need dressing change tomorrow with Rocephin.       Intravenous Access:  Midline.    Treatment Conditions:  Labs drawn today and pending results upon patient's discharge from clinic.      Post Infusion Assessment:  Patient tolerated infusion without incident.  Blood return noted pre and post infusion.  Site patent and intact, free from redness, edema or discomfort.  No evidence of extravasations.       Discharge Plan:   Patient declined prescription refills.  Discharge instructions reviewed with: Patient and Family.  Patient and family verbalized understanding of discharge instructions and all questions answered.  AVS to patient via [email or web address].  Patient will return 11/26/24 for next appointment.   Patient discharged in stable condition accompanied by: self and mother.  Departure Mode: Ambulatory with walker.      Taryn Salcedo RN

## 2024-12-12 ENCOUNTER — APPOINTMENT (OUTPATIENT)
Dept: ULTRASOUND IMAGING | Facility: CLINIC | Age: 54
End: 2024-12-12
Attending: EMERGENCY MEDICINE
Payer: COMMERCIAL

## 2024-12-12 ENCOUNTER — HOSPITAL ENCOUNTER (EMERGENCY)
Facility: CLINIC | Age: 54
Discharge: HOME | End: 2024-12-12
Attending: EMERGENCY MEDICINE
Payer: COMMERCIAL

## 2024-12-12 VITALS
BODY MASS INDEX: 32.98 KG/M2 | RESPIRATION RATE: 18 BRPM | SYSTOLIC BLOOD PRESSURE: 165 MMHG | DIASTOLIC BLOOD PRESSURE: 101 MMHG | OXYGEN SATURATION: 97 % | TEMPERATURE: 98.1 F | WEIGHT: 263.89 LBS | HEART RATE: 88 BPM

## 2024-12-12 DIAGNOSIS — M79.604 RIGHT LEG PAIN: ICD-10-CM

## 2024-12-12 LAB
BASOPHILS # BLD AUTO: 0.1 10E3/UL (ref 0–0.2)
BASOPHILS NFR BLD AUTO: 1 %
EOSINOPHIL # BLD AUTO: 0.3 10E3/UL (ref 0–0.7)
EOSINOPHIL NFR BLD AUTO: 3 %
ERYTHROCYTE [DISTWIDTH] IN BLOOD BY AUTOMATED COUNT: 13 % (ref 10–15)
HCT VFR BLD AUTO: 41.4 % (ref 40–53)
HGB BLD-MCNC: 15.1 G/DL (ref 13.3–17.7)
IMM GRANULOCYTES # BLD: 0.1 10E3/UL
IMM GRANULOCYTES NFR BLD: 1 %
LYMPHOCYTES # BLD AUTO: 2.5 10E3/UL (ref 0.8–5.3)
LYMPHOCYTES NFR BLD AUTO: 24 %
MCH RBC QN AUTO: 28.9 PG (ref 26.5–33)
MCHC RBC AUTO-ENTMCNC: 36.5 G/DL (ref 31.5–36.5)
MCV RBC AUTO: 79 FL (ref 78–100)
MONOCYTES # BLD AUTO: 0.6 10E3/UL (ref 0–1.3)
MONOCYTES NFR BLD AUTO: 6 %
NEUTROPHILS # BLD AUTO: 6.5 10E3/UL (ref 1.6–8.3)
NEUTROPHILS NFR BLD AUTO: 64 %
NRBC # BLD AUTO: 0 10E3/UL
NRBC BLD AUTO-RTO: 0 /100
PLATELET # BLD AUTO: 227 10E3/UL (ref 150–450)
RBC # BLD AUTO: 5.23 10E6/UL (ref 4.4–5.9)
WBC # BLD AUTO: 10.1 10E3/UL (ref 4–11)

## 2024-12-12 PROCEDURE — 85004 AUTOMATED DIFF WBC COUNT: CPT | Performed by: EMERGENCY MEDICINE

## 2024-12-12 PROCEDURE — 36415 COLL VENOUS BLD VENIPUNCTURE: CPT | Performed by: EMERGENCY MEDICINE

## 2024-12-12 PROCEDURE — 99284 EMERGENCY DEPT VISIT MOD MDM: CPT | Mod: 25

## 2024-12-12 PROCEDURE — 93971 EXTREMITY STUDY: CPT | Mod: RT

## 2024-12-12 ASSESSMENT — COLUMBIA-SUICIDE SEVERITY RATING SCALE - C-SSRS
2. HAVE YOU ACTUALLY HAD ANY THOUGHTS OF KILLING YOURSELF IN THE PAST MONTH?: NO
1. IN THE PAST MONTH, HAVE YOU WISHED YOU WERE DEAD OR WISHED YOU COULD GO TO SLEEP AND NOT WAKE UP?: NO
6. HAVE YOU EVER DONE ANYTHING, STARTED TO DO ANYTHING, OR PREPARED TO DO ANYTHING TO END YOUR LIFE?: NO

## 2024-12-12 ASSESSMENT — ACTIVITIES OF DAILY LIVING (ADL)
ADLS_ACUITY_SCORE: 57

## 2024-12-12 NOTE — ED PROVIDER NOTES
History     Chief Complaint:  Right leg pain       HPI   Scout Perez is a 54 year old male with history of notable pyogenic arthritis of right knee joint in mid November 2024 who presents with right leg pain.  He describes stable but ongoing pain when he wakes up to the posterior aspect of the right thigh, posterior right knee, and calf.  He was seen by his orthopedic surgeon today who recommended he come here for further evaluation of DVT.  He denies fevers, chills, redness, swelling, or any other concerns.        Independent Historian:   None    Review of External Notes:   Reviewed orthopedic note from today    ROS:  Review of Systems    Allergies:  No Known Allergies     Medications:    acetaminophen (TYLENOL) 325 MG tablet  aspirin 81 MG EC tablet  blood glucose (NO BRAND SPECIFIED) test strip  blood glucose monitoring (NO BRAND SPECIFIED) meter device kit  famotidine (PEPCID) 20 MG tablet  glipiZIDE (GLUCOTROL XL) 10 MG 24 hr tablet  hydrochlorothiazide (HYDRODIURIL) 25 MG tablet  insulin glargine (LANTUS PEN) 100 UNIT/ML pen  lisinopril (ZESTRIL) 40 MG tablet  metFORMIN (GLUCOPHAGE XR) 500 MG 24 hr tablet  multivitamin w/minerals (MULTI-VITAMIN) tablet  oxyCODONE (ROXICODONE) 5 MG tablet  polyethylene glycol (MIRALAX) 17 GM/Dose powder        Past History:    Past Medical History:   Diagnosis Date    Benign essential hypertension     Learning disability     Type 2 diabetes mellitus (H)          Physical Exam     Patient Vitals for the past 24 hrs:  Vitals:    12/12/24 1143 12/12/24 1434   BP: (!) 178/113 (!) 165/101   Pulse: 95 88   Resp: 18 18   Temp: 98.1  F (36.7  C)    TempSrc: Oral    SpO2: 100% 97%   Weight: 119.7 kg (263 lb 14.3 oz)          Physical Exam  Constitutional: Alert, attentive, no pain distress  CV: 2+ DP and PT pulses, brisk distal cap refill  MSK: Normal inspection to the right leg.  Well-healed arthrotomy anterior incision.  No erythema, warmth, swelling, or tenderness to exam.   Normal range of motion to the right knee.  No pain noted at this time, certainly no pain out of proportion to exam.  Soft compartments throughout the thigh and calf  Neurological: 5/5 strength to the DF, PF, EHL and FHL motor functions; sensation intact to the DP, SP, T, S and S distributions  Skin: Skin is warm and dry.      Emergency Department Course       Results for orders placed or performed during the hospital encounter of 12/12/24   US Lower Extremity Venous Duplex Right     Status: None    Narrative    VENOUS ULTRASOUND RIGHT LEG  12/12/2024 12:44 PM     HISTORY: right leg pain, swelling, eval for DVT    COMPARISON: None.    FINDINGS:  Examination of the deep veins with graded compression and  color flow Doppler with spectral wave form analysis was performed.  No  evidence for DVT in the right lower extremity. There is a Baker's cyst  in the right popliteal fossa measuring 3.3 x 2.2 x 0.8 cm.      Impression    IMPRESSION: No evidence of deep venous thrombosis.    YENNIFER MOY MD         SYSTEM ID:  [id number]   CBC with platelets and differential     Status: None   Result Value Ref Range    WBC Count 10.1 4.0 - 11.0 10e3/uL    RBC Count 5.23 4.40 - 5.90 10e6/uL    Hemoglobin 15.1 13.3 - 17.7 g/dL    Hematocrit 41.4 40.0 - 53.0 %    MCV 79 78 - 100 fL    MCH 28.9 26.5 - 33.0 pg    MCHC 36.5 31.5 - 36.5 g/dL    RDW 13.0 10.0 - 15.0 %    Platelet Count 227 150 - 450 10e3/uL    % Neutrophils 64 %    % Lymphocytes 24 %    % Monocytes 6 %    % Eosinophils 3 %    % Basophils 1 %    % Immature Granulocytes 1 %    NRBCs per 100 WBC 0 <1 /100    Absolute Neutrophils 6.5 1.6 - 8.3 10e3/uL    Absolute Lymphocytes 2.5 0.8 - 5.3 10e3/uL    Absolute Monocytes 0.6 0.0 - 1.3 10e3/uL    Absolute Eosinophils 0.3 0.0 - 0.7 10e3/uL    Absolute Basophils 0.1 0.0 - 0.2 10e3/uL    Absolute Immature Granulocytes 0.1 <=0.4 10e3/uL    Absolute NRBCs 0.0 10e3/uL   CBC + differential     Status: None    Narrative    The following  orders were created for panel order CBC + differential.  Procedure                               Abnormality         Status                     ---------                               -----------         ------                     CBC with platelets and d...[[id number]]                      Final result                 Please view results for these tests on the individual orders.       Emergency Department Course & Assessments:            Disposition:  The patient was discharged to home.     Impression & Plan        Medical Decision Making:  This is a 54-year-old male with history of pyogenic arthritis of the right knee who presents for evaluation of several weeks now of pain to the right thigh and calf and upon waking up.  He has a reassuring exam with no evidence of acute infectious arthritis, edema, cellulitis, etc.  CBC is normal with normal white count.  Ultrasound shows no evidence of DVT.  He is not currently in pain.  Discussed the unclear nature of symptoms.  Plan orthopedic follow-up and return precautions for worse pain, swelling, or any other concerns.      Diagnosis:  Visit Diagnosis, Associated Orders, and Comments     ICD-10-CM    1. Right leg pain  M79.604                Gabriel Hudson MD  12/12/24 1527

## 2024-12-12 NOTE — DISCHARGE INSTRUCTIONS
It was a pleasure taking care of you today. I hope you feel much better soon.  Your evaluation was reassuring against serious cause of pain.  Please follow-up with your primary care doctor in 2-3 days.  Return immediately for worse pain, fever, redness, or any other concerns.

## 2024-12-12 NOTE — ED TRIAGE NOTES
Adult Self-Care for Colds  Colds are caused by viruses. They can't be cured with antibiotics. However, you can ease symptoms and support your body's efforts to heal itself.  No matter which symptoms you have, be sure to:  · Drink plenty of fluids (water or clear soup)  · Stop smoking and drinking alcohol  · Get plenty of rest    Understand a fever  · Take your temperature several times a day. If your fever is 100.4°F (38.0°C) for more than a day, call your healthcare provider.  · Relax, lie down. Go to bed if you want. Just get off your feet and rest. Also, drink plenty of fluids to avoid dehydration.  · Take acetaminophen or a nonsteroidal anti-inflammatory agent (NSAID), such as ibuprofen.  Treat a troubled nose kindly  · Breathe steam or heated humidified air to open blocked nasal passages.  a hot shower or use a vaporizer. Be careful not to get burned by the steam.  · Saline nasal sprays and decongestant tablets help open a stuffy nose. Antihistamines can also help, but they can cause side effects such as drowsiness and drying of the eyes, nose, and mouth.  Soothe a sore throat and cough  · Gargle every 2 hours with 1/4 teaspoon of salt dissolved in 1/2 cup of warm water. Suck on throat lozenges and cough drops to moisten your throat.  · Cough medicines are available but it is unclear how well they actually work.  · Take acetaminophen or an NSAID, such as ibuprofen, to ease throat pain  Ease digestive problems  · Put fluids back into your body. Take frequent sips of clear liquids such as water or broth. Avoid drinks that have a lot of sugar in them, such as juices and sodas. These can make diarrhea worse. Older children and adults can drink sports drinks.  · As your appetite returns, you can resume your normal diet. Ask your healthcare provider if there are any foods you should avoid.  When to seek medical care  When you first notice symptoms, ask your healthcare provider if antiviral medicines are  Pt with right leg pain, states he had septic knee Nov 5th and surgery, sent here to r/o DVT. States pain goes from thigh down to calf. ABC's intact, alert and oriented X3.      Triage Assessment (Adult)       Row Name 12/12/24 1142          Triage Assessment    Airway WDL WDL        Respiratory WDL    Respiratory WDL WDL        Skin Circulation/Temperature WDL    Skin Circulation/Temperature WDL WDL        Cardiac WDL    Cardiac WDL WDL        Peripheral/Neurovascular WDL    Peripheral Neurovascular WDL WDL        Cognitive/Neuro/Behavioral WDL    Cognitive/Neuro/Behavioral WDL WDL                      appropriate. Antibiotics should not be taken for colds or flu. Also, call your healthcare provider if you have any of the following symptoms or if you aren't feeling better after 7 days:  · Shortness of breath  · Pain or pressure in the chest or belly (abdomen)  · Worsening symptoms, especially after a period of improvement  · Fever of 100.4°F  (38.0°C) or higher, or fever that doesn't go down with medicine  · Sudden dizziness or confusion  · Severe or continued vomiting  · Signs of dehydration, including extreme thirst, dark urine, infrequent urination, dry mouth  · Spotted, red, or very sore throat   Date Last Reviewed: 12/1/2016 © 2000-2017 [employer]. [address] [place] [address], [place] [address]. All rights reserved. This information is not intended as a substitute for professional medical care. Always follow your healthcare professional's instructions.        Middle Ear Infection (Adult)  You have an infection of the middle ear, the space behind the eardrum. This is also called acute otitis media (AOM). Sometimes it is caused by the common cold. This is because congestion can block the internal passage (eustachian tube) that drains fluid from the middle ear. When the middle ear fills with fluid, bacteria can grow there and cause an infection. Oral antibiotics are used to treat this illness, not ear drops. Symptoms usually start to improve within 1 to 2 days of treatment.    Home care  The following are general care guidelines:  · Finish all of the antibiotic medicine given, even though you may feel better after the first few days.  · You may use over-the-counter medicine, such as acetaminophen or ibuprofen, to control pain and fever, unless something else was prescribed. If you have chronic liver or kidney disease or have ever had a stomach ulcer or gastrointestinal bleeding, talk with your healthcare provider before using these medicines. Do not give aspirin to anyone under 18 years of age who has  a fever. It may cause severe illness or death.  Follow-up care  Follow up with your healthcare provider, or as advised, in 2 weeks if all symptoms have not gotten better, or if hearing doesn't go back to normal within 1 month.  When to seek medical advice  Call your healthcare provider right away if any of these occur:  · Ear pain gets worse or does not improve after 3 days of treatment  · Unusual drowsiness or confusion  · Neck pain, stiff neck, or headache  · Fluid or blood draining from the ear canal  · Fever of 100.4°F (38°C) or as advised   · Seizure  Date Last Reviewed: 6/1/2016  © 3806-2057 [employer]. [address] [place] [address], [place] [address]. All rights reserved. This information is not intended as a substitute for professional medical care. Always follow your healthcare professional's instructions.

## 2025-08-04 ENCOUNTER — TELEPHONE (OUTPATIENT)
Dept: INTERNAL MEDICINE | Facility: CLINIC | Age: 55
End: 2025-08-04
Payer: COMMERCIAL

## (undated) DEVICE — GLOVE BIOGEL PI SZ 8.0 40880

## (undated) DEVICE — LINEN FULL SHEET 5511

## (undated) DEVICE — CAST PADDING 6IN COTTON WEBRIL STERILE 2554

## (undated) DEVICE — SUTURE MONOCRYL+ 2-0 CT-1 36" UNDYED MCP945H

## (undated) DEVICE — GLOVE BIOGEL PI MICRO INDICATOR UNDERGLOVE SZ 6.5 48965

## (undated) DEVICE — SU PDS II 2-0 CT-2 27"  Z333H

## (undated) DEVICE — TOURNIQUET SGL  BLADDER 30"X4" BLUE 5921030135

## (undated) DEVICE — BAG CLEAR TRASH 1.3M 39X33" P4040C

## (undated) DEVICE — PACK TOTAL KNEE BOXED LATEX FREE PO15TKFCT

## (undated) DEVICE — SU ETHILON 2-0 PS 18" 585H

## (undated) DEVICE — ESU GROUND PAD ADULT W/CORD E7507

## (undated) DEVICE — GLOVE BIOGEL PI SZ 6.5 40865

## (undated) DEVICE — LINEN HALF SHEET 5512

## (undated) DEVICE — SUCTION MANIFOLD NEPTUNE 2 SYS 4 PORT 0702-020-000

## (undated) DEVICE — DRAIN HEMOVAC RESERVOIR KIT 10FR 1/8" MED 00-2550-002-10

## (undated) DEVICE — GLOVE BIOGEL PI MICRO INDICATOR UNDERGLOVE SZ 8.0 48980

## (undated) RX ORDER — TRANEXAMIC ACID 10 MG/ML
INJECTION, SOLUTION INTRAVENOUS
Status: DISPENSED
Start: 2024-11-05

## (undated) RX ORDER — ACETAMINOPHEN 325 MG/1
TABLET ORAL
Status: DISPENSED
Start: 2024-11-05

## (undated) RX ORDER — KETOROLAC TROMETHAMINE 30 MG/ML
INJECTION, SOLUTION INTRAMUSCULAR; INTRAVENOUS
Status: DISPENSED
Start: 2024-11-05

## (undated) RX ORDER — ONDANSETRON 2 MG/ML
INJECTION INTRAMUSCULAR; INTRAVENOUS
Status: DISPENSED
Start: 2024-11-05

## (undated) RX ORDER — PROPOFOL 10 MG/ML
INJECTION, EMULSION INTRAVENOUS
Status: DISPENSED
Start: 2024-11-05

## (undated) RX ORDER — DEXAMETHASONE SODIUM PHOSPHATE 4 MG/ML
INJECTION, SOLUTION INTRA-ARTICULAR; INTRALESIONAL; INTRAMUSCULAR; INTRAVENOUS; SOFT TISSUE
Status: DISPENSED
Start: 2024-11-05

## (undated) RX ORDER — CEFAZOLIN SODIUM/WATER 2 G/20 ML
SYRINGE (ML) INTRAVENOUS
Status: DISPENSED
Start: 2024-11-05

## (undated) RX ORDER — LIDOCAINE HYDROCHLORIDE 10 MG/ML
INJECTION, SOLUTION EPIDURAL; INFILTRATION; INTRACAUDAL; PERINEURAL
Status: DISPENSED
Start: 2024-11-05

## (undated) RX ORDER — FENTANYL CITRATE 50 UG/ML
INJECTION, SOLUTION INTRAMUSCULAR; INTRAVENOUS
Status: DISPENSED
Start: 2024-11-05